# Patient Record
Sex: FEMALE | Race: WHITE | NOT HISPANIC OR LATINO | Employment: UNEMPLOYED | URBAN - METROPOLITAN AREA
[De-identification: names, ages, dates, MRNs, and addresses within clinical notes are randomized per-mention and may not be internally consistent; named-entity substitution may affect disease eponyms.]

---

## 2019-10-16 ENCOUNTER — OFFICE VISIT (OUTPATIENT)
Dept: FAMILY MEDICINE | Facility: CLINIC | Age: 64
End: 2019-10-16
Payer: OTHER GOVERNMENT

## 2019-10-16 DIAGNOSIS — E78.01 FAMILIAL HYPERCHOLESTEROLEMIA: ICD-10-CM

## 2019-10-16 DIAGNOSIS — K58.9 IRRITABLE BOWEL SYNDROME, UNSPECIFIED TYPE: ICD-10-CM

## 2019-10-16 DIAGNOSIS — F51.04 CHRONIC INSOMNIA: ICD-10-CM

## 2019-10-16 DIAGNOSIS — L03.031 CELLULITIS OF TOE OF RIGHT FOOT: Primary | ICD-10-CM

## 2019-10-16 DIAGNOSIS — M54.12 CERVICAL NEUROPATHIC PAIN: ICD-10-CM

## 2019-10-16 DIAGNOSIS — F33.9 CHRONIC RECURRENT MAJOR DEPRESSIVE DISORDER: ICD-10-CM

## 2019-10-16 DIAGNOSIS — F90.0 ATTENTION DEFICIT HYPERACTIVITY DISORDER (ADHD), PREDOMINANTLY INATTENTIVE TYPE: ICD-10-CM

## 2019-10-16 DIAGNOSIS — F41.0 PANIC DISORDER WITHOUT AGORAPHOBIA: ICD-10-CM

## 2019-10-16 DIAGNOSIS — N95.2 ATROPHIC VAGINITIS: ICD-10-CM

## 2019-10-16 PROCEDURE — 99214 PR OFFICE/OUTPT VISIT, EST, LEVL IV, 30-39 MIN: ICD-10-PCS | Mod: S$GLB,,, | Performed by: FAMILY MEDICINE

## 2019-10-16 PROCEDURE — 99214 OFFICE O/P EST MOD 30 MIN: CPT | Mod: S$GLB,,, | Performed by: FAMILY MEDICINE

## 2019-10-16 RX ORDER — DOXEPIN HYDROCHLORIDE 25 MG/1
25-50 CAPSULE ORAL NIGHTLY
Qty: 180 CAPSULE | Refills: 1 | Status: SHIPPED | OUTPATIENT
Start: 2019-10-16 | End: 2020-02-18 | Stop reason: SDUPTHER

## 2019-10-16 RX ORDER — VORTIOXETINE 20 MG/1
1 TABLET, FILM COATED ORAL DAILY
Qty: 90 TABLET | Refills: 1 | Status: SHIPPED | OUTPATIENT
Start: 2019-10-16 | End: 2020-01-14

## 2019-10-16 RX ORDER — CYCLOBENZAPRINE HCL 10 MG
10 TABLET ORAL 3 TIMES DAILY PRN
Qty: 90 TABLET | Refills: 0 | Status: SHIPPED | OUTPATIENT
Start: 2019-10-16 | End: 2020-04-13

## 2019-10-16 RX ORDER — ATORVASTATIN CALCIUM 20 MG/1
TABLET, FILM COATED ORAL
COMMUNITY
Start: 2019-07-11 | End: 2019-10-16 | Stop reason: SDUPTHER

## 2019-10-16 RX ORDER — HYDROCODONE BITARTRATE AND ACETAMINOPHEN 5; 325 MG/1; MG/1
1 TABLET ORAL EVERY 12 HOURS PRN
Qty: 60 TABLET | Refills: 0 | Status: SHIPPED | OUTPATIENT
Start: 2019-10-16 | End: 2019-11-15

## 2019-10-16 RX ORDER — ESTRADIOL 0.1 MG/G
4 CREAM VAGINAL
Qty: 3 TUBE | Refills: 1 | Status: SHIPPED | OUTPATIENT
Start: 2019-10-17 | End: 2020-02-18 | Stop reason: SDUPTHER

## 2019-10-16 RX ORDER — CYCLOBENZAPRINE HCL 10 MG
TABLET ORAL
COMMUNITY
Start: 2019-07-11 | End: 2019-10-16 | Stop reason: SDUPTHER

## 2019-10-16 RX ORDER — ZOLPIDEM TARTRATE 10 MG/1
TABLET ORAL
COMMUNITY
Start: 2019-07-11 | End: 2019-10-16 | Stop reason: SDUPTHER

## 2019-10-16 RX ORDER — ATORVASTATIN CALCIUM 20 MG/1
20 TABLET, FILM COATED ORAL DAILY
Qty: 90 TABLET | Refills: 1 | Status: SHIPPED | OUTPATIENT
Start: 2019-10-16 | End: 2020-02-18 | Stop reason: SDUPTHER

## 2019-10-16 RX ORDER — DEXTROAMPHETAMINE SACCHARATE, AMPHETAMINE ASPARTATE, DEXTROAMPHETAMINE SULFATE AND AMPHETAMINE SULFATE 5; 5; 5; 5 MG/1; MG/1; MG/1; MG/1
TABLET ORAL
COMMUNITY
End: 2019-10-16 | Stop reason: SDUPTHER

## 2019-10-16 RX ORDER — LANOLIN ALCOHOL/MO/W.PET/CERES
400 CREAM (GRAM) TOPICAL DAILY
COMMUNITY

## 2019-10-16 RX ORDER — VORTIOXETINE 20 MG/1
TABLET, FILM COATED ORAL
COMMUNITY
Start: 2019-09-23 | End: 2019-10-16 | Stop reason: SDUPTHER

## 2019-10-16 RX ORDER — ARIPIPRAZOLE 20 MG/1
20 TABLET ORAL DAILY
Qty: 90 TABLET | Refills: 1 | Status: SHIPPED | OUTPATIENT
Start: 2019-10-16 | End: 2020-02-18 | Stop reason: SDUPTHER

## 2019-10-16 RX ORDER — CIPROFLOXACIN 500 MG/1
500 TABLET ORAL EVERY 12 HOURS
Qty: 14 TABLET | Refills: 0 | Status: SHIPPED | OUTPATIENT
Start: 2019-10-16 | End: 2019-10-23

## 2019-10-16 RX ORDER — ESTRADIOL 0.1 MG/G
CREAM VAGINAL
COMMUNITY
Start: 2019-09-23 | End: 2019-10-16 | Stop reason: SDUPTHER

## 2019-10-16 RX ORDER — ARIPIPRAZOLE 20 MG/1
TABLET ORAL
COMMUNITY
End: 2019-10-16 | Stop reason: SDUPTHER

## 2019-10-16 RX ORDER — DEXTROAMPHETAMINE SACCHARATE, AMPHETAMINE ASPARTATE, DEXTROAMPHETAMINE SULFATE AND AMPHETAMINE SULFATE 5; 5; 5; 5 MG/1; MG/1; MG/1; MG/1
1 TABLET ORAL DAILY
Qty: 90 TABLET | Refills: 0 | Status: SHIPPED | OUTPATIENT
Start: 2019-10-16 | End: 2020-02-18 | Stop reason: SDUPTHER

## 2019-10-16 RX ORDER — ALPRAZOLAM 1 MG/1
1 TABLET ORAL 2 TIMES DAILY PRN
Qty: 90 TABLET | Refills: 1 | Status: SHIPPED | OUTPATIENT
Start: 2019-10-16 | End: 2020-02-18 | Stop reason: SDUPTHER

## 2019-10-16 RX ORDER — ALPRAZOLAM 1 MG/1
TABLET ORAL
COMMUNITY
Start: 2019-07-11 | End: 2019-10-16 | Stop reason: SDUPTHER

## 2019-10-16 RX ORDER — ZOLPIDEM TARTRATE 10 MG/1
20 TABLET ORAL NIGHTLY
Qty: 180 TABLET | Refills: 1 | Status: SHIPPED | OUTPATIENT
Start: 2019-10-16 | End: 2020-02-18 | Stop reason: SDUPTHER

## 2019-10-16 NOTE — PROGRESS NOTES
SUBJECTIVE:    Patient ID: Stephanie Knox is a 64 y.o. female.    Chief Complaint: cervicall neuropathic pain (check up); Depression; Insomnia; Fibromyalgia; and panic disorder without agoraphobia    Pt here to checkup on chronic conditions.    Pt recently in brackish water in [place].  Cut her left big toe 2 weeks ago and it turned red and now it is starting to spread about 3 days ago.  Got her Td and flu shot 2 days ago at the pharmacy.  Leaking a little drainage. Has been soaking in some epson salt.    Anxiety is doing ok on trintellex  House is clean, clothes are washed.  Focus is doing well also.  Depression is doing better.      Sleep is horrible, trouble falling, staying, and going back. (failed trazodone, nightmares)    Taking probiotics and magnesium and IBS is improved.    Neck pain has been horrible, doesn't wake her up. Worse if she lifts something heavy. Has numbness and tingling in the arms and hands.  Takes hydrocodone as needed, #60 has lasted 6 months, ibuprofen helps.      No visits with results within 6 Month(s) from this visit.   Latest known visit with results is:   No results found for any previous visit.       Past Medical History:   Diagnosis Date    ADHD (attention deficit hyperactivity disorder)     Anxiety     Arthritis     Colon polyps     Depression     Hyperlipidemia     Insomnia     Memory difficulties      Past Surgical History:   Procedure Laterality Date    TONSILLECTOMY       Family History   Problem Relation Age of Onset    Emphysema Mother     Diabetes Father        Marital Status:   Alcohol History:  reports that she drank alcohol.  Tobacco History:  reports that she has been smoking. She has been smoking about 0.25 packs per day. She has never used smokeless tobacco.  Drug History:  reports that she does not use drugs.    Review of patient's allergies indicates:  No Known Allergies    Current Outpatient Medications:     ALPRAZolam (XANAX) 1 MG tablet,  Take 1 tablet (1 mg total) by mouth 2 (two) times daily as needed for Anxiety., Disp: 90 tablet, Rfl: 1    ARIPiprazole (ABILIFY) 20 MG Tab, Take 1 tablet (20 mg total) by mouth once daily., Disp: 90 tablet, Rfl: 1    atorvastatin (LIPITOR) 20 MG tablet, Take 1 tablet (20 mg total) by mouth once daily., Disp: 90 tablet, Rfl: 1    cyclobenzaprine (FLEXERIL) 10 MG tablet, Take 1 tablet (10 mg total) by mouth 3 (three) times daily as needed for Muscle spasms., Disp: 90 tablet, Rfl: 0    dextroamphetamine-amphetamine (ADDERALL) 20 mg tablet, Take 1 tablet by mouth once daily., Disp: 90 tablet, Rfl: 0    estradiol (ESTRACE) 0.01 % (0.1 mg/gram) vaginal cream, Place 4 g vaginally twice a week., Disp: 3 Tube, Rfl: 1    Lactobacillus rhamnosus GG (CULTURELLE) 10 billion cell capsule, Take 1 capsule by mouth once daily., Disp: , Rfl:     magnesium oxide (MAG-OX) 400 mg (241.3 mg magnesium) tablet, Take 400 mg by mouth once daily., Disp: , Rfl:     TRINTELLIX 20 mg Tab, Take 1 tablet (20 mg total) by mouth once daily., Disp: 90 tablet, Rfl: 1    zolpidem (AMBIEN) 10 mg Tab, Take 2 tablets (20 mg total) by mouth every evening., Disp: 180 tablet, Rfl: 1    ciprofloxacin HCl (CIPRO) 500 MG tablet, Take 1 tablet (500 mg total) by mouth every 12 (twelve) hours. for 7 days, Disp: 14 tablet, Rfl: 0    doxepin (SINEQUAN) 25 MG capsule, Take 1-2 capsules (25-50 mg total) by mouth every evening., Disp: 180 capsule, Rfl: 1    HYDROcodone-acetaminophen (NORCO) 5-325 mg per tablet, Take 1 tablet by mouth every 12 (twelve) hours as needed., Disp: 60 tablet, Rfl: 0    Review of Systems   Constitutional: Negative for appetite change, fatigue, fever and unexpected weight change.   Respiratory: Negative for cough, chest tightness, shortness of breath and wheezing.    Cardiovascular: Negative for chest pain and leg swelling.   Gastrointestinal: Negative for abdominal pain, constipation, nausea and vomiting.   Genitourinary: Negative  "for difficulty urinating, dysuria, frequency, menstrual problem, vaginal bleeding and vaginal discharge.   Musculoskeletal: Positive for myalgias and neck pain. Negative for arthralgias and back pain.   Skin: Positive for wound. Negative for rash.   Neurological: Positive for numbness (in the arms). Negative for dizziness, weakness and headaches.   Hematological: Does not bruise/bleed easily.   Psychiatric/Behavioral: Positive for sleep disturbance. Negative for dysphoric mood and suicidal ideas. The patient is not nervous/anxious.           Objective:      Vitals:    10/16/19 1058   BP: (P) 110/80   Pulse: (P) 96   Weight: (P) 80.6 kg (177 lb 9.6 oz)   Height: (P) 5' 7" (1.702 m)     Body mass index is 27.82 kg/m² (pended).  Physical Exam   Constitutional: She is oriented to person, place, and time. She appears well-developed and well-nourished. No distress.   overweight   HENT:   Head: Normocephalic and atraumatic.   Neck: Neck supple. No thyromegaly present.   Cardiovascular: Normal rate, regular rhythm and normal heart sounds. Exam reveals no friction rub.   No murmur heard.  Pulmonary/Chest: Effort normal and breath sounds normal. She has no wheezes. She has no rales.   Abdominal: Soft. Bowel sounds are normal. She exhibits no distension. There is no tenderness.   Musculoskeletal: She exhibits no edema.   Lymphadenopathy:     She has no cervical adenopathy.   Neurological: She is alert and oriented to person, place, and time.   Skin: Skin is warm and dry. Lesion (left big toe, with erythema around the lateral nail, with scant drainage, no induration.) noted. No rash noted.   Psychiatric: She has a normal mood and affect. Her speech is normal and behavior is normal. Judgment and thought content normal.   Vitals reviewed.        Assessment:       1. Cellulitis of toe of right foot    2. Panic disorder without agoraphobia    3. Chronic recurrent major depressive disorder    4. Irritable bowel syndrome, unspecified " type    5. Chronic insomnia    6. Cervical neuropathic pain    7. Attention deficit hyperactivity disorder (ADHD), predominantly inattentive type    8. Atrophic vaginitis    9. Familial hypercholesterolemia         Plan:       Cellulitis of toe of right foot  Comments:  Placed on cipro due to exposure to water and unlikelihood of MRSA  Orders:  -     ciprofloxacin HCl (CIPRO) 500 MG tablet; Take 1 tablet (500 mg total) by mouth every 12 (twelve) hours. for 7 days  Dispense: 14 tablet; Refill: 0    Panic disorder without agoraphobia  Comments:  Controlled. on trintellex and prn Xanax. Will continue to monitor for symptoms  Orders:  -     ALPRAZolam (XANAX) 1 MG tablet; Take 1 tablet (1 mg total) by mouth 2 (two) times daily as needed for Anxiety.  Dispense: 90 tablet; Refill: 1    Chronic recurrent major depressive disorder  Comments:  Improved on trintellix  Orders:  -     ARIPiprazole (ABILIFY) 20 MG Tab; Take 1 tablet (20 mg total) by mouth once daily.  Dispense: 90 tablet; Refill: 1  -     TRINTELLIX 20 mg Tab; Take 1 tablet (20 mg total) by mouth once daily.  Dispense: 90 tablet; Refill: 1    Irritable bowel syndrome, unspecified type  Comments:  Improved on probiotics, will continue to monitor symptoms    Chronic insomnia  Comments:  Symptomatic. Has failed may meds. Will try on doxepin.  Orders:  -     zolpidem (AMBIEN) 10 mg Tab; Take 2 tablets (20 mg total) by mouth every evening.  Dispense: 180 tablet; Refill: 1  -     doxepin (SINEQUAN) 25 MG capsule; Take 1-2 capsules (25-50 mg total) by mouth every evening.  Dispense: 180 capsule; Refill: 1    Cervical neuropathic pain  Comments:  Pain controlled. Will continue to monitor control and function  Orders:  -     cyclobenzaprine (FLEXERIL) 10 MG tablet; Take 1 tablet (10 mg total) by mouth 3 (three) times daily as needed for Muscle spasms.  Dispense: 90 tablet; Refill: 0  -     HYDROcodone-acetaminophen (NORCO) 5-325 mg per tablet; Take 1 tablet by mouth  every 12 (twelve) hours as needed.  Dispense: 60 tablet; Refill: 0    Attention deficit hyperactivity disorder (ADHD), predominantly inattentive type  Comments:  Stable. Given refill on Adderall.  Orders:  -     dextroamphetamine-amphetamine (ADDERALL) 20 mg tablet; Take 1 tablet by mouth once daily.  Dispense: 90 tablet; Refill: 0    Atrophic vaginitis  Comments:  Doing well on estrogen replacement  Orders:  -     estradiol (ESTRACE) 0.01 % (0.1 mg/gram) vaginal cream; Place 4 g vaginally twice a week.  Dispense: 3 Tube; Refill: 1    Familial hypercholesterolemia  Comments:  Statin reordered for pt today  Orders:  -     atorvastatin (LIPITOR) 20 MG tablet; Take 1 tablet (20 mg total) by mouth once daily.  Dispense: 90 tablet; Refill: 1    Chronic Pain Notes:  Have discussed the risks and benefits of chronic narcotic therapy including pain control, dependence, and addiction.  The patient is aware and accepts the risks and benefits.  Patient is aware of the risk of taking narcotics combined with benzodiazepines/muscle relaxers/hypnotics, including but not limited to breathing difficulties, coma, and death; accepts the risks; and agrees to use medications only as prescribed.    Follow up in about 4 months (around 2/16/2020).

## 2019-10-20 PROBLEM — F51.04 CHRONIC INSOMNIA: Status: ACTIVE | Noted: 2019-10-20

## 2019-10-20 PROBLEM — M54.12 CERVICAL NEUROPATHIC PAIN: Status: ACTIVE | Noted: 2019-10-20

## 2019-10-20 PROBLEM — F33.9 CHRONIC RECURRENT MAJOR DEPRESSIVE DISORDER: Status: ACTIVE | Noted: 2019-10-20

## 2019-10-20 PROBLEM — N95.2 ATROPHIC VAGINITIS: Status: ACTIVE | Noted: 2019-10-20

## 2019-10-20 PROBLEM — F90.0 ATTENTION DEFICIT HYPERACTIVITY DISORDER (ADHD), PREDOMINANTLY INATTENTIVE TYPE: Status: ACTIVE | Noted: 2019-10-20

## 2019-10-20 PROBLEM — E78.01 FAMILIAL HYPERCHOLESTEROLEMIA: Status: ACTIVE | Noted: 2019-10-20

## 2019-10-20 PROBLEM — K58.9 IRRITABLE BOWEL SYNDROME: Status: ACTIVE | Noted: 2019-10-20

## 2019-10-20 PROBLEM — F41.0 PANIC DISORDER WITHOUT AGORAPHOBIA: Status: ACTIVE | Noted: 2019-10-20

## 2019-10-20 PROBLEM — M79.7 FIBROMYALGIA: Status: ACTIVE | Noted: 2019-10-20

## 2019-10-28 ENCOUNTER — TELEPHONE (OUTPATIENT)
Dept: FAMILY MEDICINE | Facility: CLINIC | Age: 64
End: 2019-10-28

## 2019-10-28 DIAGNOSIS — L03.031 CELLULITIS OF TOE OF RIGHT FOOT: Primary | ICD-10-CM

## 2019-10-28 NOTE — TELEPHONE ENCOUNTER
Pt called stating that she had finished the cipro Dr. Hill prescribed to her at her 10/16/19 ov for he infected cut on her toe. Pt states that the toe is still red and hurt a lot. Pt states that it hurts when she puts on shoes. Pt requesting another abx or to refill the cipro to [facility]. Pt states that she has used neosporin but it doesn't help.

## 2019-10-29 ENCOUNTER — PATIENT MESSAGE (OUTPATIENT)
Dept: FAMILY MEDICINE | Facility: CLINIC | Age: 64
End: 2019-10-29

## 2019-10-29 DIAGNOSIS — L03.031 CELLULITIS OF TOE OF RIGHT FOOT: Primary | ICD-10-CM

## 2019-10-31 ENCOUNTER — TELEPHONE (OUTPATIENT)
Dept: FAMILY MEDICINE | Facility: CLINIC | Age: 64
End: 2019-10-31

## 2019-10-31 NOTE — TELEPHONE ENCOUNTER
----- Message from Jared Hodge sent at 10/31/2019 11:00 AM CDT -----  Can you refax referral dr office says they dont have it, but it was yesterday at 3:19 pm please fax to [phone]  Pt [phone]

## 2020-02-18 ENCOUNTER — OFFICE VISIT (OUTPATIENT)
Dept: FAMILY MEDICINE | Facility: CLINIC | Age: 65
End: 2020-02-18
Payer: OTHER GOVERNMENT

## 2020-02-18 VITALS — BODY MASS INDEX: 29.45 KG/M2 | HEART RATE: 103 BPM | WEIGHT: 187.63 LBS | HEIGHT: 67 IN

## 2020-02-18 DIAGNOSIS — Z12.31 SCREENING MAMMOGRAM, ENCOUNTER FOR: ICD-10-CM

## 2020-02-18 DIAGNOSIS — E78.01 FAMILIAL HYPERCHOLESTEROLEMIA: ICD-10-CM

## 2020-02-18 DIAGNOSIS — F90.0 ATTENTION DEFICIT HYPERACTIVITY DISORDER (ADHD), PREDOMINANTLY INATTENTIVE TYPE: ICD-10-CM

## 2020-02-18 DIAGNOSIS — N95.2 ATROPHIC VAGINITIS: ICD-10-CM

## 2020-02-18 DIAGNOSIS — F33.9 CHRONIC RECURRENT MAJOR DEPRESSIVE DISORDER: Primary | ICD-10-CM

## 2020-02-18 DIAGNOSIS — M25.551 RIGHT HIP PAIN: ICD-10-CM

## 2020-02-18 DIAGNOSIS — F51.04 CHRONIC INSOMNIA: ICD-10-CM

## 2020-02-18 DIAGNOSIS — G89.29 CHRONIC PAIN OF RIGHT KNEE: ICD-10-CM

## 2020-02-18 DIAGNOSIS — M25.561 CHRONIC PAIN OF RIGHT KNEE: ICD-10-CM

## 2020-02-18 DIAGNOSIS — F41.0 PANIC DISORDER WITHOUT AGORAPHOBIA: ICD-10-CM

## 2020-02-18 PROCEDURE — 99214 PR OFFICE/OUTPT VISIT, EST, LEVL IV, 30-39 MIN: ICD-10-PCS | Mod: S$GLB,,, | Performed by: FAMILY MEDICINE

## 2020-02-18 PROCEDURE — 99214 OFFICE O/P EST MOD 30 MIN: CPT | Mod: S$GLB,,, | Performed by: FAMILY MEDICINE

## 2020-02-18 RX ORDER — ALPRAZOLAM 1 MG/1
1 TABLET ORAL 2 TIMES DAILY PRN
Qty: 90 TABLET | Refills: 1 | Status: SHIPPED | OUTPATIENT
Start: 2020-02-18 | End: 2020-05-19 | Stop reason: SDUPTHER

## 2020-02-18 RX ORDER — ZOLPIDEM TARTRATE 10 MG/1
20 TABLET ORAL NIGHTLY
Qty: 180 TABLET | Refills: 1 | Status: SHIPPED | OUTPATIENT
Start: 2020-02-18 | End: 2020-05-19 | Stop reason: SDUPTHER

## 2020-02-18 RX ORDER — DOXEPIN HYDROCHLORIDE 25 MG/1
25-50 CAPSULE ORAL NIGHTLY
Qty: 180 CAPSULE | Refills: 1 | Status: SHIPPED | OUTPATIENT
Start: 2020-02-18 | End: 2020-02-18 | Stop reason: SDUPTHER

## 2020-02-18 RX ORDER — DEXTROAMPHETAMINE SACCHARATE, AMPHETAMINE ASPARTATE, DEXTROAMPHETAMINE SULFATE AND AMPHETAMINE SULFATE 5; 5; 5; 5 MG/1; MG/1; MG/1; MG/1
1 TABLET ORAL DAILY
Qty: 90 TABLET | Refills: 0 | Status: SHIPPED | OUTPATIENT
Start: 2020-02-18 | End: 2020-05-19 | Stop reason: SDUPTHER

## 2020-02-18 RX ORDER — HYDROCODONE BITARTRATE AND ACETAMINOPHEN 5; 325 MG/1; MG/1
1 TABLET ORAL EVERY 12 HOURS PRN
COMMUNITY
Start: 2019-04-08 | End: 2020-05-19 | Stop reason: SDUPTHER

## 2020-02-18 RX ORDER — VORTIOXETINE 20 MG/1
1 TABLET, FILM COATED ORAL DAILY
COMMUNITY
Start: 2020-02-03 | End: 2021-03-01

## 2020-02-18 RX ORDER — ARIPIPRAZOLE 20 MG/1
20 TABLET ORAL DAILY
Qty: 90 TABLET | Refills: 1 | Status: SHIPPED | OUTPATIENT
Start: 2020-02-18 | End: 2021-06-01

## 2020-02-18 RX ORDER — ESTRADIOL 0.1 MG/G
4 CREAM VAGINAL
Qty: 3 TUBE | Refills: 1 | Status: SHIPPED | OUTPATIENT
Start: 2020-02-20 | End: 2020-05-19 | Stop reason: SDUPTHER

## 2020-02-18 RX ORDER — DOXEPIN HYDROCHLORIDE 25 MG/1
25-50 CAPSULE ORAL NIGHTLY
Qty: 180 CAPSULE | Refills: 1 | Status: SHIPPED | OUTPATIENT
Start: 2020-02-18 | End: 2020-05-19 | Stop reason: SDUPTHER

## 2020-02-18 RX ORDER — ATORVASTATIN CALCIUM 20 MG/1
20 TABLET, FILM COATED ORAL DAILY
Qty: 90 TABLET | Refills: 1 | Status: SHIPPED | OUTPATIENT
Start: 2020-02-18 | End: 2020-05-19 | Stop reason: SDUPTHER

## 2020-02-18 NOTE — PROGRESS NOTES
SUBJECTIVE:    Patient ID: Stephanie Knox is a 64 y.o. female.    Chief Complaint: Depression; Panic disorder; Bottles brought; Knee Pain (right with hip pain x 2.5 months); and Constipation    Pt here to checkup on chronic conditions.    Anxiety is doing ok on trintellex  Focus is doing well on adderall as needed, takes a lot of notes, getting most of her housework needed.  Depression is continues to do well.      Has been sleeping better than she has in years, 5-7 hours a night, on ambien and doxepin.  Feels better in the day as result. (failed trazodone, nightmares)    IBS is doing ok probiotics and magnesium, though has been having some issues with constipation, and fluid.    Neck pain is bad, but is is not waking her up anymore.  Using a topical cream her cousin gave her.  Can't use pain patches anymore as she breaks out in a rash.  Uses a flexeril if it gets too bad.     Right knee and inner right groin has been bothering her some if she steps wrong sometimes.  Played a lot of sports as a kid, and used to have to get it drained every now and then as it used to swelled.  Walks for exercise, about half mile. But can't use if for steps.   Has numbness and tingling in the arms and hands.  Takes hydrocodone as needed, #60 has lasted 6 months, ibuprofen helps.      ---------------------------------------------------------------------  Pt had cscope in 10/2019, had polyps and has to repeat 2-3 years  Last mammogram has been a while      No visits with results within 6 Month(s) from this visit.   Latest known visit with results is:   No results found for any previous visit.       Past Medical History:   Diagnosis Date    ADHD (attention deficit hyperactivity disorder)     Anxiety     Arthritis     Colon polyps     Depression     Hyperlipidemia     Insomnia     Memory difficulties      Past Surgical History:   Procedure Laterality Date    TONSILLECTOMY       Family History   Problem Relation Age of Onset     Emphysema Mother     Diabetes Father        Marital Status:   Alcohol History:  reports that she drank alcohol.  Tobacco History:  reports that she has been smoking. She has been smoking about 0.25 packs per day. She has never used smokeless tobacco.  Drug History:  reports that she does not use drugs.    Review of patient's allergies indicates:  No Known Allergies    Current Outpatient Medications:     ALPRAZolam (XANAX) 1 MG tablet, Take 1 tablet (1 mg total) by mouth 2 (two) times daily as needed for Anxiety., Disp: 90 tablet, Rfl: 1    ARIPiprazole (ABILIFY) 20 MG Tab, Take 1 tablet (20 mg total) by mouth once daily., Disp: 90 tablet, Rfl: 1    atorvastatin (LIPITOR) 20 MG tablet, Take 1 tablet (20 mg total) by mouth once daily., Disp: 90 tablet, Rfl: 1    cyclobenzaprine (FLEXERIL) 10 MG tablet, Take 1 tablet (10 mg total) by mouth 3 (three) times daily as needed for Muscle spasms., Disp: 90 tablet, Rfl: 0    dextroamphetamine-amphetamine (ADDERALL) 20 mg tablet, Take 1 tablet by mouth once daily., Disp: 90 tablet, Rfl: 0    doxepin (SINEQUAN) 25 MG capsule, Take 1-2 capsules (25-50 mg total) by mouth every evening., Disp: 180 capsule, Rfl: 1    [START ON 2/20/2020] estradiol (ESTRACE) 0.01 % (0.1 mg/gram) vaginal cream, Place 4 g vaginally twice a week., Disp: 3 Tube, Rfl: 1    HYDROcodone-acetaminophen (NORCO) 5-325 mg per tablet, Take 1 tablet by mouth every 12 (twelve) hours as needed., Disp: , Rfl:     Lactobacillus rhamnosus GG (CULTURELLE) 10 billion cell capsule, Take 1 capsule by mouth once daily., Disp: , Rfl:     magnesium oxide (MAG-OX) 400 mg (241.3 mg magnesium) tablet, Take 400 mg by mouth once daily., Disp: , Rfl:     TRINTELLIX 20 mg Tab, Take 1 tablet by mouth once daily., Disp: , Rfl:     zolpidem (AMBIEN) 10 mg Tab, Take 2 tablets (20 mg total) by mouth every evening., Disp: 180 tablet, Rfl: 1    Review of Systems   Constitutional: Negative for appetite change,  "fatigue, fever and unexpected weight change.   Respiratory: Negative for cough, chest tightness, shortness of breath and wheezing.    Cardiovascular: Negative for chest pain and leg swelling.   Gastrointestinal: Negative for abdominal pain, constipation, nausea and vomiting.        -heartburn   Genitourinary: Negative for difficulty urinating, dysuria, frequency and urgency.   Musculoskeletal: Positive for arthralgias (right knee) and neck pain. Negative for back pain and myalgias.   Skin: Negative for rash.   Neurological: Positive for numbness. Negative for dizziness, weakness and headaches.   Hematological: Does not bruise/bleed easily.   Psychiatric/Behavioral: Positive for dysphoric mood and sleep disturbance. Negative for suicidal ideas. The patient is not nervous/anxious.    All other systems reviewed and are negative.         Objective:      Vitals:    02/18/20 1037   BP: (P) 120/84   Pulse: 103   Weight: 85.1 kg (187 lb 9.6 oz)   Height: 5' 7" (1.702 m)     Body mass index is 29.38 kg/m².     Physical Exam   Constitutional: She is oriented to person, place, and time. She appears well-developed and well-nourished. No distress.   HENT:   Head: Normocephalic and atraumatic.   Neck: Neck supple. No thyromegaly present.   Cardiovascular: Normal rate, regular rhythm and normal heart sounds. Exam reveals no friction rub.   No murmur heard.  Pulmonary/Chest: Effort normal and breath sounds normal. She has no wheezes. She has no rales.   Abdominal: Soft. Bowel sounds are normal. She exhibits no distension. There is no tenderness.   Musculoskeletal: She exhibits no edema.        Right hip: She exhibits tenderness (mild with abduction). She exhibits normal range of motion, normal strength, no bony tenderness, no swelling and no deformity.        Right knee: She exhibits normal range of motion, no swelling and no effusion.   crepitus   Lymphadenopathy:     She has no cervical adenopathy.   Neurological: She is alert " and oriented to person, place, and time.   Skin: Skin is warm and dry. No rash noted.   Psychiatric: She has a normal mood and affect. Her speech is normal and behavior is normal. Judgment and thought content normal. She is attentive.   Vitals reviewed.        Assessment:       1. Chronic recurrent major depressive disorder    2. Panic disorder without agoraphobia    3. Familial hypercholesterolemia    4. Atrophic vaginitis    5. Chronic insomnia    6. Attention deficit hyperactivity disorder (ADHD), predominantly inattentive type    7. Chronic pain of right knee    8. Right hip pain    9. Screening mammogram, encounter for         Plan:       Chronic recurrent major depressive disorder  Comments:  Improved, on trintellix. Will continue to monitor for remission  Orders:  -     ARIPiprazole (ABILIFY) 20 MG Tab; Take 1 tablet (20 mg total) by mouth once daily.  Dispense: 90 tablet; Refill: 1    Panic disorder without agoraphobia  Comments:  Controlled. On trintellex and prn Xanax. Will continue to monitor for symptoms  Orders:  -     ALPRAZolam (XANAX) 1 MG tablet; Take 1 tablet (1 mg total) by mouth 2 (two) times daily as needed for Anxiety.  Dispense: 90 tablet; Refill: 1    Familial hypercholesterolemia  Comments:  Active. Statin reordered for pt today. Labs ordered for monitoring on chronic condition  Orders:  -     atorvastatin (LIPITOR) 20 MG tablet; Take 1 tablet (20 mg total) by mouth once daily.  Dispense: 90 tablet; Refill: 1  -     X-Ray Hip 4 or more views Right; Future; Expected date: 02/18/2020    Atrophic vaginitis  Comments:  Chronic. Doing well on estrogen replacement  Orders:  -     estradiol (ESTRACE) 0.01 % (0.1 mg/gram) vaginal cream; Place 4 g vaginally twice a week.  Dispense: 3 Tube; Refill: 1    Chronic insomnia  Comments:  Symptomatic. Has failed may meds. Will try on doxepin.  Orders:  -     Discontinue: doxepin (SINEQUAN) 25 MG capsule; Take 1-2 capsules (25-50 mg total) by mouth every  evening.  Dispense: 180 capsule; Refill: 1  -     doxepin (SINEQUAN) 25 MG capsule; Take 1-2 capsules (25-50 mg total) by mouth every evening.  Dispense: 180 capsule; Refill: 1  -     zolpidem (AMBIEN) 10 mg Tab; Take 2 tablets (20 mg total) by mouth every evening.  Dispense: 180 tablet; Refill: 1    Attention deficit hyperactivity disorder (ADHD), predominantly inattentive type  Comments:  Not focused today, on Adderall prn.  Encouraged pt to take Adderall as needed for task completion and concentration enhancement.  Orders:  -     dextroamphetamine-amphetamine (ADDERALL) 20 mg tablet; Take 1 tablet by mouth once daily.  Dispense: 90 tablet; Refill: 0    Chronic pain of right knee  Comments:  Chronic. Will Xray to evaluate severity  Orders:  -     X-Ray Knee 3 View Right; Future; Expected date: 02/18/2020    Right hip pain  Comments:  Acute, may be referred pain. Will xray hip to rule out bony pathology.    Screening mammogram, encounter for  Comments:  Mammgram order sent to [facility]  Orders:  -     Mammo Digital Screening Bilat; Future; Expected date: 02/18/2020    Chronic Pain Notes:  Have discussed the risks and benefits of chronic narcotic therapy including pain control, dependence, and addiction.  The patient is aware and accepts the risks and benefits.  Patient is aware of the risk of taking narcotics combined with benzodiazepines/muscle relaxers/hypnotics, including but not limited to breathing difficulties, coma, and death; accepts the risks; and agrees to use medications only as prescribed.    Follow up in about 3 months (around 5/18/2020) for Anxiety, Insomnia, Depression, Arthritis.

## 2020-02-21 ENCOUNTER — TELEPHONE (OUTPATIENT)
Dept: FAMILY MEDICINE | Facility: CLINIC | Age: 65
End: 2020-02-21

## 2020-02-21 NOTE — TELEPHONE ENCOUNTER
Spoke with pt - Pt asked the status of her mammogram. Pt states that she is scheduled to have it done on 2/26/2020

## 2020-02-26 ENCOUNTER — HOSPITAL ENCOUNTER (OUTPATIENT)
Dept: RADIOLOGY | Facility: HOSPITAL | Age: 65
Discharge: HOME | End: 2020-02-26
Attending: FAMILY MEDICINE
Payer: OTHER GOVERNMENT

## 2020-02-26 VITALS — BODY MASS INDEX: 29.45 KG/M2 | HEIGHT: 67 IN | WEIGHT: 187.63 LBS

## 2020-02-26 DIAGNOSIS — M25.561 CHRONIC PAIN OF RIGHT KNEE: ICD-10-CM

## 2020-02-26 DIAGNOSIS — G89.29 CHRONIC PAIN OF RIGHT KNEE: ICD-10-CM

## 2020-02-26 DIAGNOSIS — E78.01 FAMILIAL HYPERCHOLESTEROLEMIA: ICD-10-CM

## 2020-02-26 DIAGNOSIS — Z12.31 SCREENING MAMMOGRAM, ENCOUNTER FOR: ICD-10-CM

## 2020-02-26 PROCEDURE — 73502 X-RAY EXAM HIP UNI 2-3 VIEWS: CPT | Mod: TC,PO,RT

## 2020-02-26 PROCEDURE — 73562 X-RAY EXAM OF KNEE 3: CPT | Mod: TC,PO,RT

## 2020-02-26 PROCEDURE — 77067 SCR MAMMO BI INCL CAD: CPT | Mod: TC,PO

## 2020-02-27 ENCOUNTER — TELEPHONE (OUTPATIENT)
Dept: FAMILY MEDICINE | Facility: CLINIC | Age: 65
End: 2020-02-27

## 2020-02-27 NOTE — TELEPHONE ENCOUNTER
----- Message from Eron Hill MD sent at 2/27/2020  8:43 AM CST -----  Please let the patient know that her mammogram is normal. To repeat in 1-2 years.

## 2020-03-04 ENCOUNTER — TELEPHONE (OUTPATIENT)
Dept: FAMILY MEDICINE | Facility: CLINIC | Age: 65
End: 2020-03-04

## 2020-03-04 NOTE — PROGRESS NOTES
Spoke to patient with results verbatim per Dr Hill. Verbalized understanding and states she will hold off on ortho referral for now.

## 2020-03-05 ENCOUNTER — TELEPHONE (OUTPATIENT)
Dept: FAMILY MEDICINE | Facility: CLINIC | Age: 65
End: 2020-03-05

## 2020-03-05 NOTE — TELEPHONE ENCOUNTER
----- Message from Eron Hill MD sent at 3/4/2020  2:02 PM CST -----  Please call the patient regarding her abnormal result.    1. Mild osteo arthritis of the knee.

## 2020-03-05 NOTE — TELEPHONE ENCOUNTER
Spoke with pt - Pt informed of her abnormal results and recommendations per Dr. Hill verbatim. Pt verbalized an understanding of our conversation. ELVIS

## 2020-03-05 NOTE — TELEPHONE ENCOUNTER
----- Message from Eron Hill MD sent at 3/4/2020  2:04 PM CST -----  Please call the patient regarding her abnormal result.    1. Mild joint space narrowing.      ## Pt has no obvious abnormalities of the hips, and mild arthritis of the knee. May benefit from seeing a orthopedist

## 2020-03-05 NOTE — TELEPHONE ENCOUNTER
Spoke with pt - Pt informed of her abnormal results and recommendations per Dr. Hill verbatim. Pt verbalized an understanding of our conversation but states that she isn't interested in seeing an orthopedist at this time. Pt states that she'll discuss possible PT at her next visit in May. ELVIS

## 2020-03-06 ENCOUNTER — TELEPHONE (OUTPATIENT)
Dept: FAMILY MEDICINE | Facility: CLINIC | Age: 65
End: 2020-03-06

## 2020-05-19 DIAGNOSIS — F51.04 CHRONIC INSOMNIA: ICD-10-CM

## 2020-05-19 DIAGNOSIS — F41.0 PANIC DISORDER WITHOUT AGORAPHOBIA: ICD-10-CM

## 2020-05-19 DIAGNOSIS — N95.2 ATROPHIC VAGINITIS: ICD-10-CM

## 2020-05-19 DIAGNOSIS — E78.01 FAMILIAL HYPERCHOLESTEROLEMIA: ICD-10-CM

## 2020-05-19 DIAGNOSIS — F90.0 ATTENTION DEFICIT HYPERACTIVITY DISORDER (ADHD), PREDOMINANTLY INATTENTIVE TYPE: ICD-10-CM

## 2020-05-19 RX ORDER — ESTRADIOL 0.1 MG/G
4 CREAM VAGINAL
Qty: 3 TUBE | Refills: 1 | Status: SHIPPED | OUTPATIENT
Start: 2020-05-21 | End: 2020-09-10 | Stop reason: SDUPTHER

## 2020-05-19 RX ORDER — DEXTROAMPHETAMINE SACCHARATE, AMPHETAMINE ASPARTATE, DEXTROAMPHETAMINE SULFATE AND AMPHETAMINE SULFATE 5; 5; 5; 5 MG/1; MG/1; MG/1; MG/1
1 TABLET ORAL DAILY
Qty: 90 TABLET | Refills: 0 | Status: SHIPPED | OUTPATIENT
Start: 2020-05-19 | End: 2020-09-10 | Stop reason: SDUPTHER

## 2020-05-19 RX ORDER — HYDROCODONE BITARTRATE AND ACETAMINOPHEN 5; 325 MG/1; MG/1
1 TABLET ORAL EVERY 12 HOURS PRN
Qty: 60 TABLET | Refills: 0 | Status: SHIPPED | OUTPATIENT
Start: 2020-05-19 | End: 2020-11-30 | Stop reason: SDUPTHER

## 2020-05-19 RX ORDER — ZOLPIDEM TARTRATE 10 MG/1
20 TABLET ORAL NIGHTLY
Qty: 180 TABLET | Refills: 0 | Status: SHIPPED | OUTPATIENT
Start: 2020-05-19 | End: 2020-09-10 | Stop reason: SDUPTHER

## 2020-05-19 RX ORDER — DOXEPIN HYDROCHLORIDE 25 MG/1
25-50 CAPSULE ORAL NIGHTLY
Qty: 180 CAPSULE | Refills: 0 | Status: SHIPPED | OUTPATIENT
Start: 2020-05-19 | End: 2020-09-10 | Stop reason: SDUPTHER

## 2020-05-19 RX ORDER — ALPRAZOLAM 1 MG/1
1 TABLET ORAL 2 TIMES DAILY PRN
Qty: 90 TABLET | Refills: 0 | Status: SHIPPED | OUTPATIENT
Start: 2020-05-19 | End: 2020-09-10 | Stop reason: SDUPTHER

## 2020-05-19 RX ORDER — ATORVASTATIN CALCIUM 20 MG/1
20 TABLET, FILM COATED ORAL DAILY
Qty: 90 TABLET | Refills: 0 | Status: SHIPPED | OUTPATIENT
Start: 2020-05-19 | End: 2020-09-10 | Stop reason: SDUPTHER

## 2020-05-19 NOTE — TELEPHONE ENCOUNTER
----- Message from Sugey Keene MA sent at 5/19/2020 11:08 AM CDT -----  Pt states he has appt with Dr. Hill tomorrow, but has been cancelled.  She is wondering is she can get her refills or does she need to come in for a visit.    Xanax 1 mg  Ambien 10 mg  Doxepin 25 mg  Adderall 20 mg  Atorvastatin 20 mg  Estrace vaginal cream  Hydrocodone 5-325 mg    Ph[facility] - [facility]    Please contact pt to advise @ [phone]

## 2020-05-19 NOTE — TELEPHONE ENCOUNTER
Pt needs to have hydrocodone and xanax sent to a local pharmacy or printed, cannot be sent to [facility]

## 2020-05-19 NOTE — TELEPHONE ENCOUNTER
Spoke with pt - pt informed that her controlled rx have been printed and available for her to  in the front office. Pt also scheduled and appt with Dr. Hill. ELVIS

## 2020-06-10 ENCOUNTER — OFFICE VISIT (OUTPATIENT)
Dept: FAMILY MEDICINE | Facility: CLINIC | Age: 65
End: 2020-06-10
Payer: OTHER GOVERNMENT

## 2020-06-10 VITALS
DIASTOLIC BLOOD PRESSURE: 82 MMHG | WEIGHT: 190.63 LBS | HEART RATE: 108 BPM | TEMPERATURE: 98 F | OXYGEN SATURATION: 95 % | HEIGHT: 67 IN | BODY MASS INDEX: 29.92 KG/M2 | SYSTOLIC BLOOD PRESSURE: 124 MMHG

## 2020-06-10 DIAGNOSIS — F90.0 ATTENTION DEFICIT HYPERACTIVITY DISORDER (ADHD), PREDOMINANTLY INATTENTIVE TYPE: ICD-10-CM

## 2020-06-10 DIAGNOSIS — Z79.899 LONG-TERM USE OF HIGH-RISK MEDICATION: ICD-10-CM

## 2020-06-10 DIAGNOSIS — Z13.89 SCREENING FOR BLOOD OR PROTEIN IN URINE: ICD-10-CM

## 2020-06-10 DIAGNOSIS — F51.04 CHRONIC INSOMNIA: ICD-10-CM

## 2020-06-10 DIAGNOSIS — F33.9 CHRONIC RECURRENT MAJOR DEPRESSIVE DISORDER: Primary | ICD-10-CM

## 2020-06-10 DIAGNOSIS — F17.200 SMOKER: ICD-10-CM

## 2020-06-10 DIAGNOSIS — M54.12 CERVICAL NEUROPATHIC PAIN: ICD-10-CM

## 2020-06-10 DIAGNOSIS — F41.0 PANIC DISORDER WITHOUT AGORAPHOBIA: ICD-10-CM

## 2020-06-10 DIAGNOSIS — Z13.6 SCREENING FOR ISCHEMIC HEART DISEASE (IHD): ICD-10-CM

## 2020-06-10 PROCEDURE — 99214 OFFICE O/P EST MOD 30 MIN: CPT | Mod: S$GLB,,, | Performed by: FAMILY MEDICINE

## 2020-06-10 PROCEDURE — 99214 PR OFFICE/OUTPT VISIT, EST, LEVL IV, 30-39 MIN: ICD-10-PCS | Mod: S$GLB,,, | Performed by: FAMILY MEDICINE

## 2020-06-10 NOTE — PROGRESS NOTES
SUBJECTIVE:    Patient ID: Stephanie Knox is a 64 y.o. female.    Chief Complaint: Depression (follow up) and Bottles brought    Pt here to checkup on chronic conditions.    Anxiety and depression is bad.  Thinks the weather is influencing.   Worried about things with her children.   Doesn't want to talk to her sisters lately.  Granddaughter (10 year old) and her two dogs are staying with her    Focus is doing well on adderall as needed, when she is going to travel or needs to get a lot of things done.     Has been sleeping better well. About 5-7 hours a night, on ambien and doxepin. (failed trazodone, nightmares)    IBS is doing ok probiotics and magnesium, though has been having some issues with constipation, and fluid.    Continues to have chronic neck pain, which is bad, but is livable, not waking her up anymore.  Still using a topical cream and flexeril if it gets too bad.     Right knee and inner right groin has been bothering her some if she steps wrong sometimes. Had Xrays done and showed mild arthritis. Continues to walk for exercise, about half mile.  Takes hydrocodone as needed, #60 has lasted 6 months, ibuprofen helps.    ---------------------------------------------------------------------  Pt had cscope in 10/2019, had polyps and has to repeat 2-3 years  Last mammogram UTD.       No visits with results within 6 Month(s) from this visit.   Latest known visit with results is:   No results found for any previous visit.       Past Medical History:   Diagnosis Date    ADHD (attention deficit hyperactivity disorder)     Anxiety     Arthritis     Colon polyps     Depression     Hyperlipidemia     Insomnia     Memory difficulties      Past Surgical History:   Procedure Laterality Date    TONSILLECTOMY       Family History   Problem Relation Age of Onset    Emphysema Mother     Diabetes Father     Breast cancer Maternal Aunt        Marital Status:   Alcohol History:  reports that she  drank alcohol.  Tobacco History:  reports that she has been smoking. She has been smoking about 0.25 packs per day. She has never used smokeless tobacco.  Drug History:  reports that she does not use drugs.    Review of patient's allergies indicates:  No Known Allergies    Current Outpatient Medications:     ALPRAZolam (XANAX) 1 MG tablet, Take 1 tablet (1 mg total) by mouth 2 (two) times daily as needed for Anxiety., Disp: 90 tablet, Rfl: 0    ARIPiprazole (ABILIFY) 20 MG Tab, Take 1 tablet (20 mg total) by mouth once daily., Disp: 90 tablet, Rfl: 1    atorvastatin (LIPITOR) 20 MG tablet, Take 1 tablet (20 mg total) by mouth once daily., Disp: 90 tablet, Rfl: 0    dextroamphetamine-amphetamine (ADDERALL) 20 mg tablet, Take 1 tablet by mouth once daily., Disp: 90 tablet, Rfl: 0    doxepin (SINEQUAN) 25 MG capsule, Take 1-2 capsules (25-50 mg total) by mouth every evening., Disp: 180 capsule, Rfl: 0    estradioL (ESTRACE) 0.01 % (0.1 mg/gram) vaginal cream, Place 4 g vaginally twice a week., Disp: 3 Tube, Rfl: 1    HYDROcodone-acetaminophen (NORCO) 5-325 mg per tablet, Take 1 tablet by mouth every 12 (twelve) hours as needed., Disp: 60 tablet, Rfl: 0    Lactobacillus rhamnosus GG (CULTURELLE) 10 billion cell capsule, Take 1 capsule by mouth once daily., Disp: , Rfl:     magnesium oxide (MAG-OX) 400 mg (241.3 mg magnesium) tablet, Take 400 mg by mouth once daily., Disp: , Rfl:     TRINTELLIX 20 mg Tab, Take 1 tablet by mouth once daily., Disp: , Rfl:     zolpidem (AMBIEN) 10 mg Tab, Take 2 tablets (20 mg total) by mouth every evening., Disp: 180 tablet, Rfl: 0    Review of Systems   Constitutional: Negative for appetite change, fatigue, fever and unexpected weight change.   Respiratory: Negative for cough, chest tightness, shortness of breath and wheezing.    Cardiovascular: Negative for chest pain and leg swelling.   Gastrointestinal: Negative for abdominal pain, constipation, nausea and vomiting.         "-heartburn   Genitourinary: Negative for difficulty urinating, dysuria, frequency and urgency.   Musculoskeletal: Positive for arthralgias (right knee) and neck pain. Negative for back pain and myalgias.   Skin: Negative for rash.   Neurological: Positive for numbness. Negative for dizziness, weakness and headaches.   Hematological: Does not bruise/bleed easily.   Psychiatric/Behavioral: Positive for dysphoric mood and sleep disturbance. Negative for suicidal ideas. The patient is not nervous/anxious.    All other systems reviewed and are negative.         Objective:      Vitals:    06/10/20 1053   BP: 124/82   Pulse: 108   Temp: 97.9 °F (36.6 °C)   SpO2: 95%   Weight: 86.5 kg (190 lb 9.6 oz)   Height: 5' 7" (1.702 m)     Body mass index is 29.85 kg/m².     Physical Exam   Constitutional: She is oriented to person, place, and time. She appears well-developed and well-nourished. No distress.   HENT:   Head: Normocephalic and atraumatic.   Neck: Neck supple. No thyromegaly present.   Cardiovascular: Normal rate, regular rhythm and normal heart sounds. Exam reveals no friction rub.   No murmur heard.  Pulmonary/Chest: Effort normal and breath sounds normal. She has no wheezes. She has no rales.   Abdominal: Soft. Bowel sounds are normal. She exhibits no distension. There is no tenderness.   Musculoskeletal: She exhibits no edema.   Lymphadenopathy:     She has no cervical adenopathy.   Neurological: She is alert and oriented to person, place, and time.   Skin: Skin is warm and dry. No rash noted.   Psychiatric: She has a normal mood and affect. Her speech is normal and behavior is normal. Judgment and thought content normal. She is attentive.   Vitals reviewed.        Assessment:       1. Chronic recurrent major depressive disorder    2. Panic disorder without agoraphobia    3. Attention deficit hyperactivity disorder (ADHD), predominantly inattentive type    4. Chronic insomnia    5. Cervical neuropathic pain    6. " Smoker    7. Long-term use of high-risk medication    8. Screening for ischemic heart disease (IHD)    9. Screening for blood or protein in urine         Plan:       Chronic recurrent major depressive disorder  Comments:  Situational. To limit input that is bothersome, and to continue regular exercise. Will continue to monitor symptoms    Panic disorder without agoraphobia  Comments:  Symptomatic. To continue Xanax as needed. Will continue to monitor symptoms    Attention deficit hyperactivity disorder (ADHD), predominantly inattentive type  Comments:  Controlled. Will continue to monitor tolerability and effectiveness of adderall.    Chronic insomnia  Comments:  Stable. Will continue to monitor effectiveness.     Cervical neuropathic pain  Comments:  Stable. Will continue to monitor pain control and function.    Smoker  Comments:  Pt strongly encouraged to stop smoking. states the time is not right yet.    Long-term use of high-risk medication  -     Comprehensive metabolic panel; Future; Expected date: 06/10/2020  -     Lipid Panel; Future; Expected date: 06/10/2020  -     Urinalysis; Future; Expected date: 06/10/2020  -     CBC auto differential; Future; Expected date: 06/10/2020    Screening for ischemic heart disease (IHD)  -     Lipid Panel; Future; Expected date: 06/10/2020    Screening for blood or protein in urine  -     Urinalysis; Future; Expected date: 06/10/2020    Labs have been ordered for monitoring of chronic conditions, just before next visit.    Follow up in about 3 months (around 9/10/2020) for Depression, Anxiety, ADD, Insomnia, Arthritis, Chronic Pain.

## 2020-08-26 ENCOUNTER — TELEPHONE (OUTPATIENT)
Dept: FAMILY MEDICINE | Facility: CLINIC | Age: 65
End: 2020-08-26

## 2020-09-05 LAB
ALBUMIN SERPL-MCNC: 4.5 G/DL (ref 3.6–5.1)
ALBUMIN/GLOB SERPL: 1.8 (CALC) (ref 1–2.5)
ALP SERPL-CCNC: 103 U/L (ref 37–153)
ALT SERPL-CCNC: 31 U/L (ref 6–29)
APPEARANCE UR: ABNORMAL
AST SERPL-CCNC: 29 U/L (ref 10–35)
BASOPHILS # BLD AUTO: 118 CELLS/UL (ref 0–200)
BASOPHILS NFR BLD AUTO: 1.3 %
BILIRUB SERPL-MCNC: 0.7 MG/DL (ref 0.2–1.2)
BILIRUB UR QL STRIP: NEGATIVE
BUN SERPL-MCNC: 14 MG/DL (ref 7–25)
BUN/CREAT SERPL: 12 (CALC) (ref 6–22)
CALCIUM SERPL-MCNC: 9.9 MG/DL (ref 8.6–10.4)
CHLORIDE SERPL-SCNC: 107 MMOL/L (ref 98–110)
CHOLEST SERPL-MCNC: 196 MG/DL
CHOLEST/HDLC SERPL: 4.4 (CALC)
CO2 SERPL-SCNC: 27 MMOL/L (ref 20–32)
COLOR UR: YELLOW
CREAT SERPL-MCNC: 1.17 MG/DL (ref 0.5–0.99)
EOSINOPHIL # BLD AUTO: 328 CELLS/UL (ref 15–500)
EOSINOPHIL NFR BLD AUTO: 3.6 %
ERYTHROCYTE [DISTWIDTH] IN BLOOD BY AUTOMATED COUNT: 15 % (ref 11–15)
GFRSERPLBLD MDRD-ARVRAT: 49 ML/MIN/1.73M2
GLOBULIN SER CALC-MCNC: 2.5 G/DL (CALC) (ref 1.9–3.7)
GLUCOSE SERPL-MCNC: 92 MG/DL (ref 65–99)
GLUCOSE UR QL STRIP: NEGATIVE
HCT VFR BLD AUTO: 48.5 % (ref 35–45)
HDLC SERPL-MCNC: 45 MG/DL
HGB BLD-MCNC: 15.9 G/DL (ref 11.7–15.5)
HGB UR QL STRIP: ABNORMAL
KETONES UR QL STRIP: NEGATIVE
LDLC SERPL CALC-MCNC: 107 MG/DL (CALC)
LEUKOCYTE ESTERASE UR QL STRIP: ABNORMAL
LYMPHOCYTES # BLD AUTO: 3221 CELLS/UL (ref 850–3900)
LYMPHOCYTES NFR BLD AUTO: 35.4 %
MCH RBC QN AUTO: 29.3 PG (ref 27–33)
MCHC RBC AUTO-ENTMCNC: 32.8 G/DL (ref 32–36)
MCV RBC AUTO: 89.3 FL (ref 80–100)
MONOCYTES # BLD AUTO: 655 CELLS/UL (ref 200–950)
MONOCYTES NFR BLD AUTO: 7.2 %
NEUTROPHILS # BLD AUTO: 4778 CELLS/UL (ref 1500–7800)
NEUTROPHILS NFR BLD AUTO: 52.5 %
NITRITE UR QL STRIP: POSITIVE
NONHDLC SERPL-MCNC: 151 MG/DL (CALC)
PH UR STRIP: ABNORMAL [PH] (ref 5–8)
PLATELET # BLD AUTO: 323 THOUSAND/UL (ref 140–400)
PMV BLD REES-ECKER: 10.1 FL (ref 7.5–12.5)
POTASSIUM SERPL-SCNC: 5 MMOL/L (ref 3.5–5.3)
PROT SERPL-MCNC: 7 G/DL (ref 6.1–8.1)
PROT UR QL STRIP: NEGATIVE
RBC # BLD AUTO: 5.43 MILLION/UL (ref 3.8–5.1)
SODIUM SERPL-SCNC: 143 MMOL/L (ref 135–146)
SP GR UR STRIP: 1.02 (ref 1–1.03)
TRIGL SERPL-MCNC: 328 MG/DL
WBC # BLD AUTO: 9.1 THOUSAND/UL (ref 3.8–10.8)

## 2020-09-10 ENCOUNTER — OFFICE VISIT (OUTPATIENT)
Dept: FAMILY MEDICINE | Facility: CLINIC | Age: 65
End: 2020-09-10
Payer: OTHER GOVERNMENT

## 2020-09-10 VITALS
SYSTOLIC BLOOD PRESSURE: 130 MMHG | BODY MASS INDEX: 29.79 KG/M2 | WEIGHT: 189.81 LBS | HEART RATE: 103 BPM | TEMPERATURE: 98 F | OXYGEN SATURATION: 95 % | DIASTOLIC BLOOD PRESSURE: 82 MMHG | HEIGHT: 67 IN

## 2020-09-10 DIAGNOSIS — E78.01 FAMILIAL HYPERCHOLESTEROLEMIA: ICD-10-CM

## 2020-09-10 DIAGNOSIS — R94.4 DECREASED CREATININE CLEARANCE: ICD-10-CM

## 2020-09-10 DIAGNOSIS — F33.9 CHRONIC RECURRENT MAJOR DEPRESSIVE DISORDER: Primary | ICD-10-CM

## 2020-09-10 DIAGNOSIS — F41.0 PANIC DISORDER WITHOUT AGORAPHOBIA: ICD-10-CM

## 2020-09-10 DIAGNOSIS — F90.0 ATTENTION DEFICIT HYPERACTIVITY DISORDER (ADHD), PREDOMINANTLY INATTENTIVE TYPE: ICD-10-CM

## 2020-09-10 DIAGNOSIS — N95.2 ATROPHIC VAGINITIS: ICD-10-CM

## 2020-09-10 DIAGNOSIS — F51.04 CHRONIC INSOMNIA: ICD-10-CM

## 2020-09-10 DIAGNOSIS — N30.00 ACUTE CYSTITIS WITHOUT HEMATURIA: ICD-10-CM

## 2020-09-10 DIAGNOSIS — Z28.21 INFLUENZA VACCINE REFUSED: ICD-10-CM

## 2020-09-10 PROCEDURE — 99214 PR OFFICE/OUTPT VISIT, EST, LEVL IV, 30-39 MIN: ICD-10-PCS | Mod: S$GLB,,, | Performed by: FAMILY MEDICINE

## 2020-09-10 PROCEDURE — 99214 OFFICE O/P EST MOD 30 MIN: CPT | Mod: S$GLB,,, | Performed by: FAMILY MEDICINE

## 2020-09-10 RX ORDER — DEXTROAMPHETAMINE SACCHARATE, AMPHETAMINE ASPARTATE, DEXTROAMPHETAMINE SULFATE AND AMPHETAMINE SULFATE 5; 5; 5; 5 MG/1; MG/1; MG/1; MG/1
1 TABLET ORAL DAILY
Qty: 90 TABLET | Refills: 0 | Status: SHIPPED | OUTPATIENT
Start: 2020-09-10 | End: 2020-11-30 | Stop reason: SDUPTHER

## 2020-09-10 RX ORDER — FLUOXETINE HYDROCHLORIDE 40 MG/1
40 CAPSULE ORAL DAILY
Qty: 90 CAPSULE | Refills: 1 | Status: SHIPPED | OUTPATIENT
Start: 2020-09-10 | End: 2021-03-01 | Stop reason: SDUPTHER

## 2020-09-10 RX ORDER — ATORVASTATIN CALCIUM 20 MG/1
20 TABLET, FILM COATED ORAL DAILY
Qty: 90 TABLET | Refills: 1 | Status: SHIPPED | OUTPATIENT
Start: 2020-09-10 | End: 2021-06-01 | Stop reason: SDUPTHER

## 2020-09-10 RX ORDER — CYCLOBENZAPRINE HCL 10 MG
1 TABLET ORAL 3 TIMES DAILY PRN
COMMUNITY
Start: 2018-05-23 | End: 2020-11-30 | Stop reason: SDUPTHER

## 2020-09-10 RX ORDER — ALPRAZOLAM 1 MG/1
1 TABLET ORAL 2 TIMES DAILY PRN
Qty: 90 TABLET | Refills: 0 | Status: SHIPPED | OUTPATIENT
Start: 2020-09-10 | End: 2020-11-30 | Stop reason: SDUPTHER

## 2020-09-10 RX ORDER — DOXEPIN HYDROCHLORIDE 25 MG/1
25-50 CAPSULE ORAL NIGHTLY
Qty: 180 CAPSULE | Refills: 1 | Status: SHIPPED | OUTPATIENT
Start: 2020-09-10 | End: 2020-11-30 | Stop reason: SDUPTHER

## 2020-09-10 RX ORDER — ESTRADIOL 0.1 MG/G
4 CREAM VAGINAL
Qty: 3 TUBE | Refills: 1 | Status: SHIPPED | OUTPATIENT
Start: 2020-09-10 | End: 2021-06-01 | Stop reason: SDUPTHER

## 2020-09-10 RX ORDER — ZOLPIDEM TARTRATE 10 MG/1
20 TABLET ORAL NIGHTLY
Qty: 180 TABLET | Refills: 1 | Status: SHIPPED | OUTPATIENT
Start: 2020-09-10 | End: 2020-11-30 | Stop reason: SDUPTHER

## 2020-09-10 RX ORDER — CEFUROXIME AXETIL 500 MG/1
500 TABLET ORAL EVERY 12 HOURS
Qty: 10 TABLET | Refills: 0 | Status: SHIPPED | OUTPATIENT
Start: 2020-09-10 | End: 2020-09-15

## 2020-09-10 NOTE — PROGRESS NOTES
SUBJECTIVE:    Patient ID: Stephanie Knox is a 65 y.o. female.    Chief Complaint: Depression (follow up) and Bottles brought    Pt here to checkup on chronic conditions.    Anxiety and depression is bad.  Still having a hard time dealing with everything going on in the world.  Has been on trintellix for about 3 years, along with the abilify. Only taking xanax once a day. Feels mostly once a day. Crying at stupid things, not wanting to talk to people. Not wanting to leave the house.  Was rough leaving the house to come to the doctor today.  does the shopping. Supposed to visit her daughter in [place] on Monday and she wants to go see her granddaughter (10 year old) play volleyball, hoping that will help.  is supposed to be driving.  Has had trouble with leaving the house prior to COVID.  Has been a few years since she has seen a psychiatrist. Has called , and has not been able to find one that will take her due to age, location, or other insurance issues.   Has done biofeedback and psychology in the past, and that doesn't seem to help. The depression just seems to come back and overwhelm.   Focus is doing well on adderall as needed, about 2 days per week, when she is going to travel or needs to get a lot of things done.     Sleeping well on ambien and doxepin, 5-7 hours. (failed trazodone, nightmares)    IBS is doing ok probiotics (culturelle) and magnesium.    Continues to have chronic neck pain, which is bad, but is livable, not waking her up anymore.  Still using a topical cream and flexeril if it gets too bad.     Right knee and inner right groin has been bothering her some if she steps wrong sometimes. Had Xrays done and showed mild arthritis. Continues to walk for exercise, about half mile.  Takes hydrocodone as needed, #60 has lasted 6 months, ibuprofen helps.    Had labs done. U/A showing  +blood/leukocytes/nitirites  ---------------------------------------------------------------------  Pt had cscope in 10/2019, had polyps and has to repeat 2-3 years  Last mammogram UTD.       Office Visit on 06/10/2020   Component Date Value Ref Range Status    Glucose 09/04/2020 92  65 - 99 mg/dL Final    BUN, Bld 09/04/2020 14  7 - 25 mg/dL Final    Creatinine 09/04/2020 1.17* 0.50 - 0.99 mg/dL Final    eGFR if non African American 09/04/2020 49* > OR = 60 mL/min/1.73m2 Final    eGFR if African American 09/04/2020 57* > OR = 60 mL/min/1.73m2 Final    BUN/Creatinine Ratio 09/04/2020 12  6 - 22 (calc) Final    Sodium 09/04/2020 143  135 - 146 mmol/L Final    Potassium 09/04/2020 5.0  3.5 - 5.3 mmol/L Final    Chloride 09/04/2020 107  98 - 110 mmol/L Final    CO2 09/04/2020 27  20 - 32 mmol/L Final    Calcium 09/04/2020 9.9  8.6 - 10.4 mg/dL Final    Total Protein 09/04/2020 7.0  6.1 - 8.1 g/dL Final    Albumin 09/04/2020 4.5  3.6 - 5.1 g/dL Final    Globulin, Total 09/04/2020 2.5  1.9 - 3.7 g/dL (calc) Final    Albumin/Globulin Ratio 09/04/2020 1.8  1.0 - 2.5 (calc) Final    Total Bilirubin 09/04/2020 0.7  0.2 - 1.2 mg/dL Final    Alkaline Phosphatase 09/04/2020 103  37 - 153 U/L Final    AST 09/04/2020 29  10 - 35 U/L Final    ALT 09/04/2020 31* 6 - 29 U/L Final    Cholesterol 09/04/2020 196  <200 mg/dL Final    HDL 09/04/2020 45* > OR = 50 mg/dL Final    Triglycerides 09/04/2020 328* <150 mg/dL Final    LDL Cholesterol 09/04/2020 107* mg/dL (calc) Final    Hdl/Cholesterol Ratio 09/04/2020 4.4  <5.0 (calc) Final    Non HDL Chol. (LDL+VLDL) 09/04/2020 151* <130 mg/dL (calc) Final    WBC 09/04/2020 9.1  3.8 - 10.8 Thousand/uL Final    RBC 09/04/2020 5.43* 3.80 - 5.10 Million/uL Final    Hemoglobin 09/04/2020 15.9* 11.7 - 15.5 g/dL Final    Hematocrit 09/04/2020 48.5* 35.0 - 45.0 % Final    Mean Corpuscular Volume 09/04/2020 89.3  80.0 - 100.0 fL Final    Mean Corpuscular Hemoglobin  09/04/2020 29.3  27.0 - 33.0 pg Final    Mean Corpuscular Hemoglobin Conc 09/04/2020 32.8  32.0 - 36.0 g/dL Final    RDW 09/04/2020 15.0  11.0 - 15.0 % Final    Platelets 09/04/2020 323  140 - 400 Thousand/uL Final    MPV 09/04/2020 10.1  7.5 - 12.5 fL Final    Neutrophils Absolute 09/04/2020 4,778  1,500 - 7,800 cells/uL Final    Lymph # 09/04/2020 3,221  850 - 3,900 cells/uL Final    Mono # 09/04/2020 655  200 - 950 cells/uL Final    Eos # 09/04/2020 328  15 - 500 cells/uL Final    Baso # 09/04/2020 118  0 - 200 cells/uL Final    Neutrophils Relative 09/04/2020 52.5  % Final    Lymph% 09/04/2020 35.4  % Final    Mono% 09/04/2020 7.2  % Final    Eosinophil% 09/04/2020 3.6  % Final    Basophil% 09/04/2020 1.3  % Final    Color, UA 09/04/2020 YELLOW  YELLOW Final    Appearance, UA 09/04/2020 CLOUDY* CLEAR Final    Specific Pyote, UA 09/04/2020 1.017  1.001 - 1.035 Final    pH, UA 09/04/2020 < OR = 5.0  5.0 - 8.0 Final    Glucose, UA 09/04/2020 NEGATIVE  NEGATIVE Final    Bilirubin, UA 09/04/2020 NEGATIVE  NEGATIVE Final    Ketones, UA 09/04/2020 NEGATIVE  NEGATIVE Final    Occult Blood UA 09/04/2020 TRACE* NEGATIVE Final    Protein, UA 09/04/2020 NEGATIVE  NEGATIVE Final    Nitrite, UA 09/04/2020 POSITIVE* NEGATIVE Final    Leukocytes, UA 09/04/2020 2+* NEGATIVE Final       Past Medical History:   Diagnosis Date    ADHD (attention deficit hyperactivity disorder)     Anxiety     Arthritis     Colon polyps     Depression     Hyperlipidemia     Insomnia     Memory difficulties      Social History     Socioeconomic History    Marital status:      Spouse name: Not on file    Number of children: Not on file    Years of education: Not on file    Highest education level: Not on file   Occupational History    Not on file   Social Needs    Financial resource strain: Not very hard    Food insecurity     Worry: Never true     Inability: Never true    Transportation needs      Medical: No     Non-medical: No   Tobacco Use    Smoking status: Current Every Day Smoker     Packs/day: 0.25    Smokeless tobacco: Never Used   Substance and Sexual Activity    Alcohol use: Not Currently     Frequency: Monthly or less     Drinks per session: 1 or 2     Binge frequency: Never    Drug use: Never    Sexual activity: Not on file   Lifestyle    Physical activity     Days per week: 0 days     Minutes per session: Not on file    Stress: Very much   Relationships    Social connections     Talks on phone: Twice a week     Gets together: Once a week     Attends [employer]ical service: Not on file     Active member of club or organization: Yes     Attends meetings of clubs or organizations: More than 4 times per year     Relationship status:    Other Topics Concern    Not on file   Social History Narrative    Not on file     Past Surgical History:   Procedure Laterality Date    TONSILLECTOMY       Family History   Problem Relation Age of Onset    Emphysema Mother     Diabetes Father     Breast cancer Maternal Aunt        Review of patient's allergies indicates:  No Known Allergies    Current Outpatient Medications:     ALPRAZolam (XANAX) 1 MG tablet, Take 1 tablet (1 mg total) by mouth 2 (two) times daily as needed for Anxiety., Disp: 90 tablet, Rfl: 0    ARIPiprazole (ABILIFY) 20 MG Tab, Take 1 tablet (20 mg total) by mouth once daily., Disp: 90 tablet, Rfl: 1    atorvastatin (LIPITOR) 20 MG tablet, Take 1 tablet (20 mg total) by mouth once daily., Disp: 90 tablet, Rfl: 1    cyclobenzaprine (FLEXERIL) 10 MG tablet, Take 1 tablet by mouth 3 (three) times daily as needed., Disp: , Rfl:     dextroamphetamine-amphetamine (ADDERALL) 20 mg tablet, Take 1 tablet by mouth once daily., Disp: 90 tablet, Rfl: 0    doxepin (SINEQUAN) 25 MG capsule, Take 1-2 capsules (25-50 mg total) by mouth every evening., Disp: 180 capsule, Rfl: 1    estradioL (ESTRACE) 0.01 % (0.1 mg/gram) vaginal cream, Place  "4 g vaginally twice a week., Disp: 3 Tube, Rfl: 1    HYDROcodone-acetaminophen (NORCO) 5-325 mg per tablet, Take 1 tablet by mouth every 12 (twelve) hours as needed., Disp: 60 tablet, Rfl: 0    Lactobacillus rhamnosus GG (CULTURELLE) 10 billion cell capsule, Take 1 capsule by mouth once daily., Disp: , Rfl:     magnesium oxide (MAG-OX) 400 mg (241.3 mg magnesium) tablet, Take 400 mg by mouth once daily., Disp: , Rfl:     TRINTELLIX 20 mg Tab, Take 1 tablet by mouth once daily., Disp: , Rfl:     zolpidem (AMBIEN) 10 mg Tab, Take 2 tablets (20 mg total) by mouth every evening., Disp: 180 tablet, Rfl: 1    FLUoxetine 40 MG capsule, Take 1 capsule (40 mg total) by mouth once daily., Disp: 90 capsule, Rfl: 1    levomefolate-algal oil (DEPLIN, ALGAL OIL,) 7.5-90.314 mg Cap, Take 1 capsule by mouth once daily., Disp: 90 capsule, Rfl: 1    Review of Systems   Constitutional: Negative for appetite change, fatigue, fever and unexpected weight change.   Respiratory: Negative for cough, chest tightness, shortness of breath and wheezing.    Cardiovascular: Negative for chest pain and leg swelling.   Gastrointestinal: Negative for abdominal pain, constipation, nausea and vomiting.        -heartburn   Genitourinary: Negative for difficulty urinating, dysuria, frequency and urgency.   Musculoskeletal: Positive for arthralgias (right knee) and neck pain. Negative for back pain and myalgias.   Skin: Negative for rash.   Neurological: Positive for numbness. Negative for dizziness, weakness and headaches.   Hematological: Does not bruise/bleed easily.   Psychiatric/Behavioral: Positive for dysphoric mood and sleep disturbance. Negative for suicidal ideas. The patient is not nervous/anxious.    All other systems reviewed and are negative.         Objective:      Vitals:    09/10/20 1044   BP: 130/82   Pulse: 103   Temp: 98 °F (36.7 °C)   SpO2: 95%   Weight: 86.1 kg (189 lb 12.8 oz)   Height: 5' 7" (1.702 m)     Physical " Exam  Vitals signs reviewed.   Constitutional:       General: She is not in acute distress.     Appearance: Normal appearance. She is well-developed. She is obese.   HENT:      Head: Normocephalic and atraumatic.   Neck:      Musculoskeletal: Neck supple.      Thyroid: No thyromegaly.   Cardiovascular:      Rate and Rhythm: Normal rate and regular rhythm.      Heart sounds: Normal heart sounds. No murmur. No friction rub.   Pulmonary:      Effort: Pulmonary effort is normal.      Breath sounds: Normal breath sounds. No wheezing or rales.   Abdominal:      General: Bowel sounds are normal. There is no distension.      Palpations: Abdomen is soft.      Tenderness: There is no abdominal tenderness.   Lymphadenopathy:      Cervical: No cervical adenopathy.   Skin:     General: Skin is warm and dry.      Findings: No rash.   Neurological:      Mental Status: She is alert and oriented to person, place, and time.   Psychiatric:         Attention and Perception: She is attentive.         Speech: Speech normal.         Behavior: Behavior normal.         Thought Content: Thought content normal.         Judgment: Judgment normal.           Assessment:       1. Chronic recurrent major depressive disorder    2. Atrophic vaginitis    3. Familial hypercholesterolemia    4. Decreased creatinine clearance    5. Acute cystitis without hematuria    6. Chronic insomnia    7. Attention deficit hyperactivity disorder (ADHD), predominantly inattentive type    8. Panic disorder without agoraphobia    9. Influenza vaccine refused         Plan:       Chronic recurrent major depressive disorder  Comments:  Symptomatic. Encouraged to establish care with psychology  Orders:  -     levomefolate-algal oil (DEPLIN, ALGAL OIL,) 7.5-90.314 mg Cap; Take 1 capsule by mouth once daily.  Dispense: 90 capsule; Refill: 1  -     FLUoxetine 40 MG capsule; Take 1 capsule (40 mg total) by mouth once daily.  Dispense: 90 capsule; Refill: 1    Atrophic  vaginitis  Comments:  Chronic. Doing well on estrogen replacement  Orders:  -     estradioL (ESTRACE) 0.01 % (0.1 mg/gram) vaginal cream; Place 4 g vaginally twice a week.  Dispense: 3 Tube; Refill: 1    Familial hypercholesterolemia  Comments:  Active. Statin reordered for pt today. Labs ordered for monitoring on chronic condition  Orders:  -     atorvastatin (LIPITOR) 20 MG tablet; Take 1 tablet (20 mg total) by mouth once daily.  Dispense: 90 tablet; Refill: 1    Decreased creatinine clearance    Acute cystitis without hematuria  Comments:  Symptomatic. Will tx with anbx.  Orders:  -     cefUROXime (CEFTIN) 500 MG tablet; Take 1 tablet (500 mg total) by mouth every 12 (twelve) hours. for 5 days  Dispense: 10 tablet; Refill: 0    Chronic insomnia  Comments:  Improved. To continue on ambien/doxepin. Will continue on monitor symptoms.  Orders:  -     zolpidem (AMBIEN) 10 mg Tab; Take 2 tablets (20 mg total) by mouth every evening.  Dispense: 180 tablet; Refill: 1  -     doxepin (SINEQUAN) 25 MG capsule; Take 1-2 capsules (25-50 mg total) by mouth every evening.  Dispense: 180 capsule; Refill: 1    Attention deficit hyperactivity disorder (ADHD), predominantly inattentive type  Comments:  Focus controlled. On prn Adderall.   Orders:  -     dextroamphetamine-amphetamine (ADDERALL) 20 mg tablet; Take 1 tablet by mouth once daily.  Dispense: 90 tablet; Refill: 0    Panic disorder without agoraphobia  Comments:  Controlled. On trintellex and prn Xanax. Will continue to monitor for symptoms  Orders:  -     ALPRAZolam (XANAX) 1 MG tablet; Take 1 tablet (1 mg total) by mouth 2 (two) times daily as needed for Anxiety.  Dispense: 90 tablet; Refill: 0    Influenza vaccine refused    Other  Lab results discussed and reviewed with patient.    Follow up in about 3 months (around 12/10/2020) for Anxiety, Insomnia, Depression, ADD.        9/27/2020 Eron Hill

## 2020-11-30 ENCOUNTER — OFFICE VISIT (OUTPATIENT)
Dept: FAMILY MEDICINE | Facility: CLINIC | Age: 65
End: 2020-11-30
Payer: OTHER GOVERNMENT

## 2020-11-30 VITALS
HEART RATE: 93 BPM | SYSTOLIC BLOOD PRESSURE: 116 MMHG | DIASTOLIC BLOOD PRESSURE: 82 MMHG | TEMPERATURE: 98 F | WEIGHT: 192.63 LBS | BODY MASS INDEX: 30.17 KG/M2

## 2020-11-30 DIAGNOSIS — Z13.6 SCREENING FOR ISCHEMIC HEART DISEASE (IHD): ICD-10-CM

## 2020-11-30 DIAGNOSIS — R30.0 DYSURIA: ICD-10-CM

## 2020-11-30 DIAGNOSIS — Z79.899 LONG-TERM USE OF HIGH-RISK MEDICATION: ICD-10-CM

## 2020-11-30 DIAGNOSIS — F90.0 ATTENTION DEFICIT HYPERACTIVITY DISORDER (ADHD), PREDOMINANTLY INATTENTIVE TYPE: ICD-10-CM

## 2020-11-30 DIAGNOSIS — M54.12 CERVICAL NEUROPATHIC PAIN: Primary | ICD-10-CM

## 2020-11-30 DIAGNOSIS — F41.0 PANIC DISORDER WITHOUT AGORAPHOBIA: ICD-10-CM

## 2020-11-30 DIAGNOSIS — F51.04 CHRONIC INSOMNIA: ICD-10-CM

## 2020-11-30 LAB
BILIRUB SERPL-MCNC: NEGATIVE MG/DL
BLOOD URINE, POC: NEGATIVE
CLARITY, POC UA: CLEAR
COLOR, POC UA: YELLOW
GLUCOSE UR QL STRIP: NEGATIVE
KETONES UR QL STRIP: NEGATIVE
LEUKOCYTE ESTERASE URINE, POC: NEGATIVE
NITRITE, POC UA: NEGATIVE
PH, POC UA: 6.5
PROTEIN, POC: NEGATIVE
SPECIFIC GRAVITY, POC UA: 1.01
UROBILINOGEN, POC UA: 0.2

## 2020-11-30 PROCEDURE — 81002 URINALYSIS NONAUTO W/O SCOPE: CPT | Mod: S$GLB,,, | Performed by: FAMILY MEDICINE

## 2020-11-30 PROCEDURE — 99214 PR OFFICE/OUTPT VISIT, EST, LEVL IV, 30-39 MIN: ICD-10-PCS | Mod: 25,S$GLB,, | Performed by: FAMILY MEDICINE

## 2020-11-30 PROCEDURE — 99214 OFFICE O/P EST MOD 30 MIN: CPT | Mod: 25,S$GLB,, | Performed by: FAMILY MEDICINE

## 2020-11-30 PROCEDURE — 81002 POCT URINE DIPSTICK WITHOUT MICROSCOPE: ICD-10-PCS | Mod: S$GLB,,, | Performed by: FAMILY MEDICINE

## 2020-11-30 RX ORDER — ZOLPIDEM TARTRATE 10 MG/1
20 TABLET ORAL NIGHTLY
Qty: 180 TABLET | Refills: 1 | Status: SHIPPED | OUTPATIENT
Start: 2020-11-30 | End: 2021-06-01 | Stop reason: SDUPTHER

## 2020-11-30 RX ORDER — DOXEPIN HYDROCHLORIDE 50 MG/1
50-100 CAPSULE ORAL NIGHTLY
Qty: 180 CAPSULE | Refills: 1 | Status: SHIPPED | OUTPATIENT
Start: 2020-11-30 | End: 2021-06-01 | Stop reason: SDUPTHER

## 2020-11-30 RX ORDER — CYCLOBENZAPRINE HCL 10 MG
10 TABLET ORAL 3 TIMES DAILY PRN
Qty: 270 TABLET | Refills: 1 | Status: SHIPPED | OUTPATIENT
Start: 2020-11-30 | End: 2021-06-01 | Stop reason: SDUPTHER

## 2020-11-30 RX ORDER — PHENAZOPYRIDINE HYDROCHLORIDE 200 MG/1
200 TABLET, FILM COATED ORAL 3 TIMES DAILY PRN
Qty: 10 TABLET | Refills: 0 | Status: SHIPPED | OUTPATIENT
Start: 2020-11-30 | End: 2020-12-05

## 2020-11-30 RX ORDER — HYDROCODONE BITARTRATE AND ACETAMINOPHEN 5; 325 MG/1; MG/1
1 TABLET ORAL EVERY 12 HOURS PRN
Qty: 60 TABLET | Refills: 0 | Status: SHIPPED | OUTPATIENT
Start: 2020-11-30 | End: 2021-06-01 | Stop reason: SDUPTHER

## 2020-11-30 RX ORDER — DEXTROAMPHETAMINE SACCHARATE, AMPHETAMINE ASPARTATE, DEXTROAMPHETAMINE SULFATE AND AMPHETAMINE SULFATE 5; 5; 5; 5 MG/1; MG/1; MG/1; MG/1
1 TABLET ORAL DAILY
Qty: 90 TABLET | Refills: 0 | Status: SHIPPED | OUTPATIENT
Start: 2020-11-30 | End: 2021-03-01 | Stop reason: SDUPTHER

## 2020-11-30 RX ORDER — ALPRAZOLAM 1 MG/1
1 TABLET ORAL NIGHTLY PRN
Qty: 90 TABLET | Refills: 0 | Status: SHIPPED | OUTPATIENT
Start: 2020-11-30 | End: 2021-03-01 | Stop reason: SDUPTHER

## 2020-11-30 NOTE — PROGRESS NOTES
SUBJECTIVE:    Patient ID: Stephanie Knox is a 65 y.o. female.    Chief Complaint: Insomnia (flu UTD / pneumo UTD per pt / zoster UTD-JE)    Pt here to checkup on chronic conditions.    Anxiety and depression is doing ok.  Had trouble getting up to her son's.  Lives in [place]. Her  drove. But once she got there, was good.   Has been doing better since she has better since her meds were changed trintellix to prozac, and left her on the abilify.  Could not afford folbic, and the pharmacy gave her a good substitute.  bad.  Still having a hard time dealing with everything going on in the world. Her mood has been real good the last 3 weeks. Her outlook has changed for the better. Actually looking forward to the holidays, which she usually hates. (Has done biofeedback and psychology in the past, and that doesn't seem to help. )      Anxiety is doing ok, taking xanax at night.   No longer crying all the time.     Focus is doing well on adderall as needed. Still has issues with short term memory.     Sleeping well on ambien and doxepin, getting a good 6 hours. (failed trazodone, nightmares)    IBS is doing ok probiotics (culturelle) and magnesium.    Continues to have chronic neck pain, which she is able to tolerate.   Still using a topical cream and flexeril if it gets too bad.     Right knee and inner right groin has been bothering her some if she steps wrong sometimes. Had Xrays done and showed mild arthritis. Continues to walk for exercise, about half mile.  Takes hydrocodone as needed, #60 has lasted 6 months, ibuprofen helps.    Has been having trouble emptying bladder, frequency and dysuria.  U/A  Is normal.   ---------------------------------------------------------------------  Pt had cscope in 10/2019, had polyps and has to repeat 2-3 years  Last mammogram UTD.       Office Visit on 11/30/2020   Component Date Value Ref Range Status    Color, UA 11/30/2020 Yellow   Final    pH, UA 11/30/2020 6.5   Final     WBC, UA 11/30/2020 negative   Final    Nitrite, UA 11/30/2020 negative   Final    Protein, UA 11/30/2020 negative   Final    Glucose, UA 11/30/2020 negative   Final    Ketones, UA 11/30/2020 negative   Final    Urobilinogen, UA 11/30/2020 0.2   Final    Bilirubin, UA 11/30/2020 negative   Final    Blood, UA 11/30/2020 negative   Final    Clarity, UA 11/30/2020 Clear   Final    Spec Grav UA 11/30/2020 1.010   Final    Urine Culture, Routine 11/30/2020 *  Final   Office Visit on 06/10/2020   Component Date Value Ref Range Status    Glucose 09/04/2020 92  65 - 99 mg/dL Final    BUN 09/04/2020 14  7 - 25 mg/dL Final    Creatinine 09/04/2020 1.17* 0.50 - 0.99 mg/dL Final    eGFR if non African American 09/04/2020 49* > OR = 60 mL/min/1.73m2 Final    eGFR if African American 09/04/2020 57* > OR = 60 mL/min/1.73m2 Final    BUN/Creatinine Ratio 09/04/2020 12  6 - 22 (calc) Final    Sodium 09/04/2020 143  135 - 146 mmol/L Final    Potassium 09/04/2020 5.0  3.5 - 5.3 mmol/L Final    Chloride 09/04/2020 107  98 - 110 mmol/L Final    CO2 09/04/2020 27  20 - 32 mmol/L Final    Calcium 09/04/2020 9.9  8.6 - 10.4 mg/dL Final    Total Protein 09/04/2020 7.0  6.1 - 8.1 g/dL Final    Albumin 09/04/2020 4.5  3.6 - 5.1 g/dL Final    Globulin, Total 09/04/2020 2.5  1.9 - 3.7 g/dL (calc) Final    Albumin/Globulin Ratio 09/04/2020 1.8  1.0 - 2.5 (calc) Final    Total Bilirubin 09/04/2020 0.7  0.2 - 1.2 mg/dL Final    Alkaline Phosphatase 09/04/2020 103  37 - 153 U/L Final    AST 09/04/2020 29  10 - 35 U/L Final    ALT 09/04/2020 31* 6 - 29 U/L Final    Cholesterol 09/04/2020 196  <200 mg/dL Final    HDL 09/04/2020 45* > OR = 50 mg/dL Final    Triglycerides 09/04/2020 328* <150 mg/dL Final    LDL Cholesterol 09/04/2020 107* mg/dL (calc) Final    HDL/Cholesterol Ratio 09/04/2020 4.4  <5.0 (calc) Final    Non HDL Chol. (LDL+VLDL) 09/04/2020 151* <130 mg/dL (calc) Final    WBC 09/04/2020 9.1  3.8 - 10.8  Thousand/uL Final    RBC 09/04/2020 5.43* 3.80 - 5.10 Million/uL Final    Hemoglobin 09/04/2020 15.9* 11.7 - 15.5 g/dL Final    Hematocrit 09/04/2020 48.5* 35.0 - 45.0 % Final    MCV 09/04/2020 89.3  80.0 - 100.0 fL Final    MCH 09/04/2020 29.3  27.0 - 33.0 pg Final    MCHC 09/04/2020 32.8  32.0 - 36.0 g/dL Final    RDW 09/04/2020 15.0  11.0 - 15.0 % Final    Platelets 09/04/2020 323  140 - 400 Thousand/uL Final    MPV 09/04/2020 10.1  7.5 - 12.5 fL Final    Neutrophils Absolute 09/04/2020 4,778  1,500 - 7,800 cells/uL Final    Lymph # 09/04/2020 3,221  850 - 3,900 cells/uL Final    Mono # 09/04/2020 655  200 - 950 cells/uL Final    Eos # 09/04/2020 328  15 - 500 cells/uL Final    Baso # 09/04/2020 118  0 - 200 cells/uL Final    Neutrophils Relative 09/04/2020 52.5  % Final    Lymph % 09/04/2020 35.4  % Final    Mono % 09/04/2020 7.2  % Final    Eosinophil % 09/04/2020 3.6  % Final    Basophil % 09/04/2020 1.3  % Final    Color, UA 09/04/2020 YELLOW  YELLOW Final    Appearance, UA 09/04/2020 CLOUDY* CLEAR Final    Specific [place], UA 09/04/2020 1.017  1.001 - 1.035 Final    pH, UA 09/04/2020 < OR = 5.0  5.0 - 8.0 Final    Glucose, UA 09/04/2020 NEGATIVE  NEGATIVE Final    Bilirubin, UA 09/04/2020 NEGATIVE  NEGATIVE Final    Ketones, UA 09/04/2020 NEGATIVE  NEGATIVE Final    Occult Blood UA 09/04/2020 TRACE* NEGATIVE Final    Protein, UA 09/04/2020 NEGATIVE  NEGATIVE Final    Nitrite, UA 09/04/2020 POSITIVE* NEGATIVE Final    Leukocytes, UA 09/04/2020 2+* NEGATIVE Final       Past Medical History:   Diagnosis Date    ADHD (attention deficit hyperactivity disorder)     Anxiety     Arthritis     Colon polyps     Depression     Hyperlipidemia     Insomnia     Memory difficulties      Social History     Socioeconomic History    Marital status:      Spouse name: Not on file    Number of children: Not on file    Years of education: Not on file    Highest education level:  Not on file   Occupational History    Not on file   Social Needs    Financial resource strain: Not very hard    Food insecurity     Worry: Never true     Inability: Never true    Transportation needs     Medical: No     Non-medical: No   Tobacco Use    Smoking status: Current Every Day Smoker     Packs/day: 0.25    Smokeless tobacco: Never Used   Substance and Sexual Activity    Alcohol use: Not Currently     Frequency: Monthly or less     Drinks per session: 1 or 2     Binge frequency: Never    Drug use: Never    Sexual activity: Not on file   Lifestyle    Physical activity     Days per week: 0 days     Minutes per session: Not on file    Stress: Very much   Relationships    Social connections     Talks on phone: Twice a week     Gets together: Once a week     Attends Religion service: Not on file     Active member of club or organization: Yes     Attends meetings of clubs or organizations: More than 4 times per year     Relationship status:    Other Topics Concern    Not on file   Social History Narrative    Not on file     Past Surgical History:   Procedure Laterality Date    TONSILLECTOMY       Family History   Problem Relation Age of Onset    Emphysema Mother     Diabetes Father     Breast cancer Maternal Aunt        Review of patient's allergies indicates:  No Known Allergies    Current Outpatient Medications:     ALPRAZolam (XANAX) 1 MG tablet, Take 1 tablet (1 mg total) by mouth nightly as needed for Anxiety., Disp: 90 tablet, Rfl: 0    ARIPiprazole (ABILIFY) 20 MG Tab, Take 1 tablet (20 mg total) by mouth once daily., Disp: 90 tablet, Rfl: 1    atorvastatin (LIPITOR) 20 MG tablet, Take 1 tablet (20 mg total) by mouth once daily., Disp: 90 tablet, Rfl: 1    cyclobenzaprine (FLEXERIL) 10 MG tablet, Take 1 tablet (10 mg total) by mouth 3 (three) times daily as needed., Disp: 270 tablet, Rfl: 1    dextroamphetamine-amphetamine (ADDERALL) 20 mg tablet, Take 1 tablet by mouth once  daily., Disp: 90 tablet, Rfl: 0    doxepin (SINEQUAN) 50 MG capsule, Take 1-2 capsules ( mg total) by mouth every evening., Disp: 180 capsule, Rfl: 1    estradioL (ESTRACE) 0.01 % (0.1 mg/gram) vaginal cream, Place 4 g vaginally twice a week., Disp: 3 Tube, Rfl: 1    FLUoxetine 40 MG capsule, Take 1 capsule (40 mg total) by mouth once daily., Disp: 90 capsule, Rfl: 1    HYDROcodone-acetaminophen (NORCO) 5-325 mg per tablet, Take 1 tablet by mouth every 12 (twelve) hours as needed., Disp: 60 tablet, Rfl: 0    Lactobacillus rhamnosus GG (CULTURELLE) 10 billion cell capsule, Take 1 capsule by mouth once daily., Disp: , Rfl:     levomefolate-algal oil (DEPLIN, ALGAL OIL,) 7.5-90.314 mg Cap, Take 1 capsule by mouth once daily., Disp: 90 capsule, Rfl: 1    magnesium oxide (MAG-OX) 400 mg (241.3 mg magnesium) tablet, Take 400 mg by mouth once daily., Disp: , Rfl:     zolpidem (AMBIEN) 10 mg Tab, Take 2 tablets (20 mg total) by mouth every evening., Disp: 180 tablet, Rfl: 1    TRINTELLIX 20 mg Tab, Take 1 tablet by mouth once daily., Disp: , Rfl:     Review of Systems   Constitutional: Negative for appetite change, fatigue, fever and unexpected weight change.   Respiratory: Negative for cough, chest tightness, shortness of breath and wheezing.    Cardiovascular: Negative for chest pain and leg swelling.   Gastrointestinal: Negative for abdominal pain, constipation, nausea and vomiting.        -heartburn   Genitourinary: Negative for difficulty urinating, dysuria, frequency and urgency.   Musculoskeletal: Positive for arthralgias (right knee) and neck pain. Negative for back pain and myalgias.   Skin: Negative for rash.   Neurological: Positive for numbness. Negative for dizziness, weakness and headaches.   Hematological: Does not bruise/bleed easily.   Psychiatric/Behavioral: Positive for dysphoric mood and sleep disturbance. Negative for suicidal ideas. The patient is not nervous/anxious.    All other  systems reviewed and are negative.         Objective:      Vitals:    11/30/20 1102   BP: 116/82   Pulse: 93   Temp: 98.3 °F (36.8 °C)   Weight: 87.4 kg (192 lb 9.6 oz)     Physical Exam  Vitals signs reviewed.   Constitutional:       General: She is not in acute distress.     Appearance: Normal appearance. She is well-developed. She is obese.   HENT:      Head: Normocephalic and atraumatic.   Neck:      Musculoskeletal: Neck supple.      Thyroid: No thyromegaly.   Cardiovascular:      Rate and Rhythm: Normal rate and regular rhythm.      Heart sounds: Normal heart sounds. No murmur. No friction rub.   Pulmonary:      Effort: Pulmonary effort is normal.      Breath sounds: Normal breath sounds. No wheezing or rales.   Abdominal:      General: Bowel sounds are normal. There is no distension.      Palpations: Abdomen is soft.      Tenderness: There is no abdominal tenderness.   Lymphadenopathy:      Cervical: No cervical adenopathy.   Skin:     General: Skin is warm and dry.      Findings: No rash.   Neurological:      Mental Status: She is alert and oriented to person, place, and time.   Psychiatric:         Attention and Perception: She is attentive.         Speech: Speech normal.         Behavior: Behavior normal.         Thought Content: Thought content normal.         Judgment: Judgment normal.           Assessment:       1. Cervical neuropathic pain    2. Panic disorder without agoraphobia    3. Attention deficit hyperactivity disorder (ADHD), predominantly inattentive type    4. Chronic insomnia    5. Dysuria    6. Long-term use of high-risk medication    7. Screening for ischemic heart disease (IHD)         Plan:       Cervical neuropathic pain  Comments:  Pain controlled. To continue conservative care with topical agents, prn norco.  Orders:  -     cyclobenzaprine (FLEXERIL) 10 MG tablet; Take 1 tablet (10 mg total) by mouth 3 (three) times daily as needed.  Dispense: 270 tablet; Refill: 1  -      HYDROcodone-acetaminophen (NORCO) 5-325 mg per tablet; Take 1 tablet by mouth every 12 (twelve) hours as needed.  Dispense: 60 tablet; Refill: 0  -     Pain Managment Profile 4, w/ Confirm, Ur; Future; Expected date: 12/27/2020    Panic disorder without agoraphobia  Comments:  Controlled. On prozac and prn Xanax. Will continue to monitor for symptoms  Orders:  -     ALPRAZolam (XANAX) 1 MG tablet; Take 1 tablet (1 mg total) by mouth nightly as needed for Anxiety.  Dispense: 90 tablet; Refill: 0  -     Pain Managment Profile 4, w/ Confirm, Ur; Future; Expected date: 12/27/2020    Attention deficit hyperactivity disorder (ADHD), predominantly inattentive type  Comments:  Focus controlled. On prn Adderall.   Orders:  -     dextroamphetamine-amphetamine (ADDERALL) 20 mg tablet; Take 1 tablet by mouth once daily.  Dispense: 90 tablet; Refill: 0    Chronic insomnia  Comments:  Improved. To continue on ambien/doxepin.  Discussed weaning off ambien and tapering up doxepin. Will continue on monitor symptoms.  Orders:  -     doxepin (SINEQUAN) 50 MG capsule; Take 1-2 capsules ( mg total) by mouth every evening.  Dispense: 180 capsule; Refill: 1  -     zolpidem (AMBIEN) 10 mg Tab; Take 2 tablets (20 mg total) by mouth every evening.  Dispense: 180 tablet; Refill: 1    Dysuria  Comments:  Controlled. U/A normal, will get culture. May need PVD, renal ultrasound  Orders:  -     POCT urine dipstick without microscope  -     phenazopyridine (PYRIDIUM) 200 MG tablet; Take 1 tablet (200 mg total) by mouth 3 (three) times daily as needed for Pain.  Dispense: 10 tablet; Refill: 0  -     Urine Culture, Routine  -     Urine culture    Long-term use of high-risk medication  -     Comprehensive Metabolic Panel; Future; Expected date: 12/27/2020  -     Lipid Panel; Future; Expected date: 12/27/2020  -     Urinalysis; Future; Expected date: 12/27/2020  -     CBC Auto Differential; Future; Expected date: 12/27/2020  -     Pain Managment  Profile 4, w/ Confirm, Ur; Future; Expected date: 12/27/2020    Screening for ischemic heart disease (IHD)  -     Comprehensive Metabolic Panel; Future; Expected date: 12/27/2020  -     Lipid Panel; Future; Expected date: 12/27/2020    Labs and/or tests have been ordered for the evaluation/monitoring of acute/chronic conditions, to be done just before next visit.    Chronic Pain Notes:  Have discussed the risks and benefits of chronic narcotic therapy including pain control, dependence, and addiction.  The patient is aware and accepts the risks and benefits. Patient is aware of the risk of taking narcotics combined with benzodiazepines/muscle relaxers/hypnotics, including but not limited to breathing difficulties, coma, and death; accepts the risks; and agrees to use medications only as prescribed.      Follow up in about 3 months (around 2/28/2021) for Anxiety, Arthritis, ADD, Insomnia.        12/27/2020 [place]

## 2020-12-02 LAB — BACTERIA UR CULT: ABNORMAL

## 2020-12-03 ENCOUNTER — TELEPHONE (OUTPATIENT)
Dept: FAMILY MEDICINE | Facility: CLINIC | Age: 65
End: 2020-12-03

## 2020-12-03 DIAGNOSIS — N30.00 ACUTE CYSTITIS WITHOUT HEMATURIA: Primary | ICD-10-CM

## 2020-12-03 RX ORDER — NITROFURANTOIN 25; 75 MG/1; MG/1
100 CAPSULE ORAL 2 TIMES DAILY
Qty: 10 CAPSULE | Refills: 0 | Status: SHIPPED | OUTPATIENT
Start: 2020-12-03 | End: 2020-12-08

## 2021-02-18 ENCOUNTER — TELEPHONE (OUTPATIENT)
Dept: FAMILY MEDICINE | Facility: CLINIC | Age: 66
End: 2021-02-18

## 2021-03-01 ENCOUNTER — OFFICE VISIT (OUTPATIENT)
Dept: FAMILY MEDICINE | Facility: CLINIC | Age: 66
End: 2021-03-01
Payer: OTHER GOVERNMENT

## 2021-03-01 ENCOUNTER — TELEPHONE (OUTPATIENT)
Dept: ORTHOPEDICS | Facility: CLINIC | Age: 66
End: 2021-03-01

## 2021-03-01 VITALS
HEART RATE: 88 BPM | BODY MASS INDEX: 31.23 KG/M2 | HEIGHT: 67 IN | SYSTOLIC BLOOD PRESSURE: 112 MMHG | WEIGHT: 199 LBS | DIASTOLIC BLOOD PRESSURE: 82 MMHG

## 2021-03-01 DIAGNOSIS — F41.0 PANIC DISORDER WITHOUT AGORAPHOBIA: ICD-10-CM

## 2021-03-01 DIAGNOSIS — R39.89 SENSATION OF PRESSURE IN BLADDER AREA: ICD-10-CM

## 2021-03-01 DIAGNOSIS — F17.200 SMOKER: ICD-10-CM

## 2021-03-01 DIAGNOSIS — Z12.31 SCREENING MAMMOGRAM, ENCOUNTER FOR: ICD-10-CM

## 2021-03-01 DIAGNOSIS — R10.31 RIGHT GROIN PAIN: ICD-10-CM

## 2021-03-01 DIAGNOSIS — F33.9 CHRONIC RECURRENT MAJOR DEPRESSIVE DISORDER: ICD-10-CM

## 2021-03-01 DIAGNOSIS — F90.0 ATTENTION DEFICIT HYPERACTIVITY DISORDER (ADHD), PREDOMINANTLY INATTENTIVE TYPE: Primary | ICD-10-CM

## 2021-03-01 PROCEDURE — 99214 OFFICE O/P EST MOD 30 MIN: CPT | Mod: S$GLB,,, | Performed by: FAMILY MEDICINE

## 2021-03-01 PROCEDURE — 99214 PR OFFICE/OUTPT VISIT, EST, LEVL IV, 30-39 MIN: ICD-10-PCS | Mod: S$GLB,,, | Performed by: FAMILY MEDICINE

## 2021-03-01 RX ORDER — PHENAZOPYRIDINE HYDROCHLORIDE 200 MG/1
200 TABLET, FILM COATED ORAL 3 TIMES DAILY PRN
Qty: 30 TABLET | Refills: 0 | Status: SHIPPED | OUTPATIENT
Start: 2021-03-01 | End: 2023-02-06

## 2021-03-01 RX ORDER — FLUOXETINE HYDROCHLORIDE 40 MG/1
40 CAPSULE ORAL DAILY
Qty: 90 CAPSULE | Refills: 1 | Status: SHIPPED | OUTPATIENT
Start: 2021-03-01 | End: 2021-09-15 | Stop reason: SDUPTHER

## 2021-03-01 RX ORDER — DEXTROAMPHETAMINE SACCHARATE, AMPHETAMINE ASPARTATE, DEXTROAMPHETAMINE SULFATE AND AMPHETAMINE SULFATE 5; 5; 5; 5 MG/1; MG/1; MG/1; MG/1
1 TABLET ORAL DAILY
Qty: 90 TABLET | Refills: 0 | Status: SHIPPED | OUTPATIENT
Start: 2021-03-01 | End: 2021-06-01 | Stop reason: SDUPTHER

## 2021-03-01 RX ORDER — PHENAZOPYRIDINE HYDROCHLORIDE 200 MG/1
200 TABLET, FILM COATED ORAL 3 TIMES DAILY PRN
COMMUNITY
End: 2021-03-01 | Stop reason: SDUPTHER

## 2021-03-01 RX ORDER — ALPRAZOLAM 1 MG/1
1 TABLET ORAL NIGHTLY PRN
Qty: 90 TABLET | Refills: 1 | Status: SHIPPED | OUTPATIENT
Start: 2021-03-01 | End: 2021-09-29 | Stop reason: SDUPTHER

## 2021-03-12 DIAGNOSIS — R10.31 RIGHT GROIN PAIN: Primary | ICD-10-CM

## 2021-03-15 ENCOUNTER — HOSPITAL ENCOUNTER (OUTPATIENT)
Dept: RADIOLOGY | Facility: HOSPITAL | Age: 66
Discharge: HOME | End: 2021-03-15
Attending: ORTHOPAEDIC SURGERY
Payer: OTHER GOVERNMENT

## 2021-03-15 ENCOUNTER — OFFICE VISIT (OUTPATIENT)
Dept: ORTHOPEDICS | Facility: CLINIC | Age: 66
End: 2021-03-15
Payer: OTHER GOVERNMENT

## 2021-03-15 VITALS — HEIGHT: 67 IN | WEIGHT: 199 LBS | RESPIRATION RATE: 16 BRPM | BODY MASS INDEX: 31.23 KG/M2

## 2021-03-15 DIAGNOSIS — M16.11 ARTHRITIS OF RIGHT HIP: Primary | ICD-10-CM

## 2021-03-15 DIAGNOSIS — R10.31 RIGHT GROIN PAIN: ICD-10-CM

## 2021-03-15 PROCEDURE — 73502 XR HIP 2 VIEW RIGHT: ICD-10-PCS | Mod: 26,RT,, | Performed by: RADIOLOGY

## 2021-03-15 PROCEDURE — 99214 OFFICE O/P EST MOD 30 MIN: CPT | Mod: PBBFAC,25,PN | Performed by: ORTHOPAEDIC SURGERY

## 2021-03-15 PROCEDURE — 73502 X-RAY EXAM HIP UNI 2-3 VIEWS: CPT | Mod: 26,RT,, | Performed by: RADIOLOGY

## 2021-03-15 PROCEDURE — 99243 OFF/OP CNSLTJ NEW/EST LOW 30: CPT | Mod: S$PBB,,, | Performed by: ORTHOPAEDIC SURGERY

## 2021-03-15 PROCEDURE — 99999 PR PBB SHADOW E&M-EST. PATIENT-LVL IV: ICD-10-PCS | Mod: PBBFAC,,, | Performed by: ORTHOPAEDIC SURGERY

## 2021-03-15 PROCEDURE — 73502 X-RAY EXAM HIP UNI 2-3 VIEWS: CPT | Mod: TC,PN,RT

## 2021-03-15 PROCEDURE — 99999 PR PBB SHADOW E&M-EST. PATIENT-LVL IV: CPT | Mod: PBBFAC,,, | Performed by: ORTHOPAEDIC SURGERY

## 2021-03-15 PROCEDURE — 99243 PR OFFICE CONSULTATION,LEVEL III: ICD-10-PCS | Mod: S$PBB,,, | Performed by: ORTHOPAEDIC SURGERY

## 2021-03-24 ENCOUNTER — HOSPITAL ENCOUNTER (OUTPATIENT)
Dept: RADIOLOGY | Facility: HOSPITAL | Age: 66
Discharge: HOME | End: 2021-03-24
Attending: FAMILY MEDICINE
Payer: OTHER GOVERNMENT

## 2021-03-24 DIAGNOSIS — R39.89 SENSATION OF PRESSURE IN BLADDER AREA: ICD-10-CM

## 2021-03-24 DIAGNOSIS — Z12.31 SCREENING MAMMOGRAM, ENCOUNTER FOR: ICD-10-CM

## 2021-03-24 PROCEDURE — 76857 US EXAM PELVIC LIMITED: CPT | Mod: TC,PO

## 2021-03-24 PROCEDURE — 77067 SCR MAMMO BI INCL CAD: CPT | Mod: TC,PO

## 2021-03-25 ENCOUNTER — TELEPHONE (OUTPATIENT)
Dept: ORTHOPEDICS | Facility: CLINIC | Age: 66
End: 2021-03-25

## 2021-03-25 ENCOUNTER — IMMUNIZATION (OUTPATIENT)
Dept: FAMILY MEDICINE | Facility: CLINIC | Age: 66
End: 2021-03-25
Payer: OTHER GOVERNMENT

## 2021-03-25 ENCOUNTER — TELEPHONE (OUTPATIENT)
Dept: FAMILY MEDICINE | Facility: CLINIC | Age: 66
End: 2021-03-25

## 2021-03-25 DIAGNOSIS — M16.11 ARTHRITIS OF RIGHT HIP: Primary | ICD-10-CM

## 2021-03-25 DIAGNOSIS — Z23 NEED FOR VACCINATION: Primary | ICD-10-CM

## 2021-03-25 PROCEDURE — 0001A COVID-19, MRNA, LNP-S, PF, 30 MCG/0.3 ML DOSE VACCINE: ICD-10-PCS | Mod: CV19,S$GLB,, | Performed by: INTERNAL MEDICINE

## 2021-03-25 PROCEDURE — 0001A COVID-19, MRNA, LNP-S, PF, 30 MCG/0.3 ML DOSE VACCINE: CPT | Mod: CV19,S$GLB,, | Performed by: INTERNAL MEDICINE

## 2021-03-25 PROCEDURE — 91300 COVID-19, MRNA, LNP-S, PF, 30 MCG/0.3 ML DOSE VACCINE: ICD-10-PCS | Mod: S$GLB,,, | Performed by: INTERNAL MEDICINE

## 2021-03-25 PROCEDURE — 91300 COVID-19, MRNA, LNP-S, PF, 30 MCG/0.3 ML DOSE VACCINE: CPT | Mod: S$GLB,,, | Performed by: INTERNAL MEDICINE

## 2021-03-31 ENCOUNTER — HOSPITAL ENCOUNTER (OUTPATIENT)
Dept: RADIOLOGY | Facility: HOSPITAL | Age: 66
Discharge: HOME | End: 2021-03-31
Attending: ORTHOPAEDIC SURGERY
Payer: OTHER GOVERNMENT

## 2021-03-31 DIAGNOSIS — M16.11 ARTHRITIS OF RIGHT HIP: ICD-10-CM

## 2021-03-31 PROCEDURE — 20610 DRAIN/INJ JOINT/BURSA W/O US: CPT | Mod: RT,,, | Performed by: RADIOLOGY

## 2021-03-31 PROCEDURE — 25500020 PHARM REV CODE 255: Performed by: ORTHOPAEDIC SURGERY

## 2021-03-31 PROCEDURE — 77002 FL ASPIRATION INJECTION MAJOR JOINT RIGHT WITH FLUORO: ICD-10-PCS | Mod: 26,,, | Performed by: RADIOLOGY

## 2021-03-31 PROCEDURE — 20610 FL ASPIRATION INJECTION MAJOR JOINT RIGHT WITH FLUORO: ICD-10-PCS | Mod: RT,,, | Performed by: RADIOLOGY

## 2021-03-31 PROCEDURE — 25000003 PHARM REV CODE 250: Performed by: RADIOLOGY

## 2021-03-31 PROCEDURE — 20610 DRAIN/INJ JOINT/BURSA W/O US: CPT | Mod: TC,RT

## 2021-03-31 PROCEDURE — 77002 NEEDLE LOCALIZATION BY XRAY: CPT | Mod: 26,,, | Performed by: RADIOLOGY

## 2021-03-31 PROCEDURE — 63600175 PHARM REV CODE 636 W HCPCS: Performed by: RADIOLOGY

## 2021-03-31 RX ORDER — BUPIVACAINE HYDROCHLORIDE 2.5 MG/ML
5 INJECTION, SOLUTION EPIDURAL; INFILTRATION; INTRACAUDAL ONCE
Status: COMPLETED | OUTPATIENT
Start: 2021-03-31 | End: 2021-03-31

## 2021-03-31 RX ORDER — METHYLPREDNISOLONE ACETATE 80 MG/ML
80 INJECTION, SUSPENSION INTRA-ARTICULAR; INTRALESIONAL; INTRAMUSCULAR; SOFT TISSUE ONCE
Status: COMPLETED | OUTPATIENT
Start: 2021-03-31 | End: 2021-03-31

## 2021-03-31 RX ADMIN — IOHEXOL 1 ML: 350 INJECTION, SOLUTION INTRAVENOUS at 08:03

## 2021-03-31 RX ADMIN — METHYLPREDNISOLONE ACETATE 80 MG: 80 INJECTION, SUSPENSION INTRA-ARTICULAR; INTRALESIONAL; INTRAMUSCULAR; SOFT TISSUE at 08:03

## 2021-03-31 RX ADMIN — BUPIVACAINE HYDROCHLORIDE 3 ML: 2.5 INJECTION, SOLUTION EPIDURAL; INFILTRATION; INTRACAUDAL; PERINEURAL at 08:03

## 2021-04-15 ENCOUNTER — IMMUNIZATION (OUTPATIENT)
Dept: FAMILY MEDICINE | Facility: CLINIC | Age: 66
End: 2021-04-15
Payer: OTHER GOVERNMENT

## 2021-04-15 DIAGNOSIS — Z23 NEED FOR VACCINATION: Primary | ICD-10-CM

## 2021-04-15 PROCEDURE — 91300 COVID-19, MRNA, LNP-S, PF, 30 MCG/0.3 ML DOSE VACCINE: CPT | Mod: S$GLB,,, | Performed by: FAMILY MEDICINE

## 2021-04-15 PROCEDURE — 0002A COVID-19, MRNA, LNP-S, PF, 30 MCG/0.3 ML DOSE VACCINE: CPT | Mod: CV19,S$GLB,, | Performed by: FAMILY MEDICINE

## 2021-04-15 PROCEDURE — 91300 COVID-19, MRNA, LNP-S, PF, 30 MCG/0.3 ML DOSE VACCINE: ICD-10-PCS | Mod: S$GLB,,, | Performed by: FAMILY MEDICINE

## 2021-04-15 PROCEDURE — 0002A COVID-19, MRNA, LNP-S, PF, 30 MCG/0.3 ML DOSE VACCINE: ICD-10-PCS | Mod: CV19,S$GLB,, | Performed by: FAMILY MEDICINE

## 2021-06-01 ENCOUNTER — OFFICE VISIT (OUTPATIENT)
Dept: FAMILY MEDICINE | Facility: CLINIC | Age: 66
End: 2021-06-01
Payer: MEDICARE

## 2021-06-01 VITALS
OXYGEN SATURATION: 96 % | SYSTOLIC BLOOD PRESSURE: 124 MMHG | WEIGHT: 200 LBS | DIASTOLIC BLOOD PRESSURE: 76 MMHG | HEART RATE: 78 BPM | HEIGHT: 67 IN | BODY MASS INDEX: 31.39 KG/M2

## 2021-06-01 DIAGNOSIS — F90.0 ATTENTION DEFICIT HYPERACTIVITY DISORDER (ADHD), PREDOMINANTLY INATTENTIVE TYPE: ICD-10-CM

## 2021-06-01 DIAGNOSIS — F17.200 SMOKER: ICD-10-CM

## 2021-06-01 DIAGNOSIS — F51.04 CHRONIC INSOMNIA: ICD-10-CM

## 2021-06-01 DIAGNOSIS — M25.551 ARTHRALGIA OF RIGHT HIP: Primary | ICD-10-CM

## 2021-06-01 DIAGNOSIS — Z76.0 MEDICATION REFILL: ICD-10-CM

## 2021-06-01 PROCEDURE — 99214 OFFICE O/P EST MOD 30 MIN: CPT | Mod: S$GLB,,, | Performed by: FAMILY MEDICINE

## 2021-06-01 PROCEDURE — 99214 PR OFFICE/OUTPT VISIT, EST, LEVL IV, 30-39 MIN: ICD-10-PCS | Mod: S$GLB,,, | Performed by: FAMILY MEDICINE

## 2021-06-01 RX ORDER — HYDROCODONE BITARTRATE AND ACETAMINOPHEN 5; 325 MG/1; MG/1
1 TABLET ORAL EVERY 12 HOURS PRN
Qty: 60 TABLET | Refills: 0 | Status: SHIPPED | OUTPATIENT
Start: 2021-06-01 | End: 2021-09-15 | Stop reason: SDUPTHER

## 2021-06-01 RX ORDER — CYCLOBENZAPRINE HCL 10 MG
10 TABLET ORAL 3 TIMES DAILY PRN
Qty: 270 TABLET | Refills: 1 | Status: SHIPPED | OUTPATIENT
Start: 2021-06-01 | End: 2022-06-03 | Stop reason: SDUPTHER

## 2021-06-01 RX ORDER — ZOLPIDEM TARTRATE 10 MG/1
20 TABLET ORAL NIGHTLY
Qty: 180 TABLET | Refills: 1 | Status: SHIPPED | OUTPATIENT
Start: 2021-06-01 | End: 2021-09-15 | Stop reason: SDUPTHER

## 2021-06-01 RX ORDER — ATORVASTATIN CALCIUM 20 MG/1
20 TABLET, FILM COATED ORAL DAILY
Qty: 90 TABLET | Refills: 1 | Status: SHIPPED | OUTPATIENT
Start: 2021-06-01 | End: 2021-09-15 | Stop reason: SDUPTHER

## 2021-06-01 RX ORDER — DEXTROAMPHETAMINE SACCHARATE, AMPHETAMINE ASPARTATE, DEXTROAMPHETAMINE SULFATE AND AMPHETAMINE SULFATE 5; 5; 5; 5 MG/1; MG/1; MG/1; MG/1
1 TABLET ORAL DAILY
Qty: 90 TABLET | Refills: 0 | Status: SHIPPED | OUTPATIENT
Start: 2021-06-01 | End: 2021-09-15 | Stop reason: SDUPTHER

## 2021-06-01 RX ORDER — ESTRADIOL 0.1 MG/G
4 CREAM VAGINAL
Qty: 3 TUBE | Refills: 1 | Status: SHIPPED | OUTPATIENT
Start: 2021-06-03 | End: 2022-01-03 | Stop reason: SDUPTHER

## 2021-06-01 RX ORDER — DOXEPIN HYDROCHLORIDE 100 MG/1
100 CAPSULE ORAL NIGHTLY
Qty: 90 CAPSULE | Refills: 1 | Status: SHIPPED | OUTPATIENT
Start: 2021-06-01 | End: 2021-09-15 | Stop reason: SDUPTHER

## 2021-07-01 RX ORDER — DULOXETIN HYDROCHLORIDE 30 MG/1
30 CAPSULE, DELAYED RELEASE ORAL DAILY
Qty: 90 CAPSULE | Refills: 1 | Status: SHIPPED | OUTPATIENT
Start: 2021-07-01 | End: 2021-09-15

## 2021-07-12 ENCOUNTER — TELEPHONE (OUTPATIENT)
Dept: FAMILY MEDICINE | Facility: CLINIC | Age: 66
End: 2021-07-12

## 2021-07-16 ENCOUNTER — TELEPHONE (OUTPATIENT)
Dept: FAMILY MEDICINE | Facility: CLINIC | Age: 66
End: 2021-07-16

## 2021-08-17 ENCOUNTER — TELEPHONE (OUTPATIENT)
Dept: FAMILY MEDICINE | Facility: CLINIC | Age: 66
End: 2021-08-17

## 2021-08-17 DIAGNOSIS — M25.551 ARTHRALGIA OF RIGHT HIP: Primary | ICD-10-CM

## 2021-08-18 ENCOUNTER — TELEPHONE (OUTPATIENT)
Dept: FAMILY MEDICINE | Facility: CLINIC | Age: 66
End: 2021-08-18

## 2021-09-10 ENCOUNTER — LAB VISIT (OUTPATIENT)
Dept: LAB | Facility: HOSPITAL | Age: 66
End: 2021-09-10
Attending: FAMILY MEDICINE
Payer: MEDICARE

## 2021-09-10 DIAGNOSIS — F41.0 PANIC DISORDER WITHOUT AGORAPHOBIA: ICD-10-CM

## 2021-09-10 DIAGNOSIS — Z13.6 SCREENING FOR ISCHEMIC HEART DISEASE: ICD-10-CM

## 2021-09-10 DIAGNOSIS — Z79.899 ENCOUNTER FOR LONG-TERM (CURRENT) USE OF OTHER MEDICATIONS: Primary | ICD-10-CM

## 2021-09-10 DIAGNOSIS — M54.12 BRACHIAL NEURITIS: ICD-10-CM

## 2021-09-10 LAB
ALBUMIN SERPL BCP-MCNC: 4.3 G/DL (ref 3.5–5.2)
ALP SERPL-CCNC: 139 U/L (ref 55–135)
ALT SERPL W/O P-5'-P-CCNC: 53 U/L (ref 10–44)
AMPHET+METHAMPHET UR QL: ABNORMAL
ANION GAP SERPL CALC-SCNC: 10 MMOL/L (ref 8–16)
AST SERPL-CCNC: 51 U/L (ref 10–40)
BACTERIA #/AREA URNS HPF: ABNORMAL /HPF
BARBITURATES UR QL SCN>200 NG/ML: NEGATIVE
BASOPHILS # BLD AUTO: 0.06 K/UL (ref 0–0.2)
BASOPHILS NFR BLD: 0.6 % (ref 0–1.9)
BENZODIAZ UR QL SCN>200 NG/ML: ABNORMAL
BILIRUB SERPL-MCNC: 1 MG/DL (ref 0.1–1)
BILIRUB UR QL STRIP: ABNORMAL
BUN SERPL-MCNC: 16 MG/DL (ref 8–23)
BZE UR QL SCN: NEGATIVE
CALCIUM SERPL-MCNC: 9.5 MG/DL (ref 8.7–10.5)
CANNABINOIDS UR QL SCN: NEGATIVE
CHLORIDE SERPL-SCNC: 111 MMOL/L (ref 95–110)
CHOLEST SERPL-MCNC: 177 MG/DL (ref 120–199)
CHOLEST/HDLC SERPL: 6.6 {RATIO} (ref 2–5)
CLARITY UR: ABNORMAL
CO2 SERPL-SCNC: 22 MMOL/L (ref 23–29)
COLOR UR: YELLOW
CREAT SERPL-MCNC: 1.1 MG/DL (ref 0.5–1.4)
CREAT UR-MCNC: 239 MG/DL (ref 15–325)
DIFFERENTIAL METHOD: ABNORMAL
EOSINOPHIL # BLD AUTO: 0.2 K/UL (ref 0–0.5)
EOSINOPHIL NFR BLD: 2.1 % (ref 0–8)
ERYTHROCYTE [DISTWIDTH] IN BLOOD BY AUTOMATED COUNT: 14.8 % (ref 11.5–14.5)
EST. GFR  (AFRICAN AMERICAN): >60 ML/MIN/1.73 M^2
EST. GFR  (NON AFRICAN AMERICAN): 52.8 ML/MIN/1.73 M^2
GLUCOSE SERPL-MCNC: 102 MG/DL (ref 70–110)
GLUCOSE UR QL STRIP: NEGATIVE
HCT VFR BLD AUTO: 45 % (ref 37–48.5)
HDLC SERPL-MCNC: 27 MG/DL (ref 40–75)
HDLC SERPL: 15.3 % (ref 20–50)
HGB BLD-MCNC: 14.5 G/DL (ref 12–16)
HGB UR QL STRIP: ABNORMAL
HYALINE CASTS #/AREA URNS LPF: 5 /LPF
IMM GRANULOCYTES # BLD AUTO: 0.04 K/UL (ref 0–0.04)
IMM GRANULOCYTES NFR BLD AUTO: 0.4 % (ref 0–0.5)
KETONES UR QL STRIP: NEGATIVE
LDLC SERPL CALC-MCNC: 108 MG/DL (ref 63–159)
LEUKOCYTE ESTERASE UR QL STRIP: ABNORMAL
LYMPHOCYTES # BLD AUTO: 2.8 K/UL (ref 1–4.8)
LYMPHOCYTES NFR BLD: 29.4 % (ref 18–48)
MCH RBC QN AUTO: 28.7 PG (ref 27–31)
MCHC RBC AUTO-ENTMCNC: 32.2 G/DL (ref 32–36)
MCV RBC AUTO: 89 FL (ref 82–98)
MICROSCOPIC COMMENT: ABNORMAL
MONOCYTES # BLD AUTO: 1 K/UL (ref 0.3–1)
MONOCYTES NFR BLD: 10.1 % (ref 4–15)
NEUTROPHILS # BLD AUTO: 5.4 K/UL (ref 1.8–7.7)
NEUTROPHILS NFR BLD: 57.4 % (ref 38–73)
NITRITE UR QL STRIP: POSITIVE
NONHDLC SERPL-MCNC: 150 MG/DL
NRBC BLD-RTO: 0 /100 WBC
OPIATES UR QL SCN: ABNORMAL
PCP UR QL SCN>25 NG/ML: NEGATIVE
PH UR STRIP: 6 [PH] (ref 5–8)
PLATELET # BLD AUTO: 306 K/UL (ref 150–450)
PMV BLD AUTO: 9.8 FL (ref 9.2–12.9)
POTASSIUM SERPL-SCNC: 4.3 MMOL/L (ref 3.5–5.1)
PROT SERPL-MCNC: 7.6 G/DL (ref 6–8.4)
PROT UR QL STRIP: ABNORMAL
RBC # BLD AUTO: 5.06 M/UL (ref 4–5.4)
RBC #/AREA URNS HPF: 3 /HPF (ref 0–4)
SODIUM SERPL-SCNC: 143 MMOL/L (ref 136–145)
SP GR UR STRIP: 1.02 (ref 1–1.03)
SQUAMOUS #/AREA URNS HPF: 1 /HPF
TOXICOLOGY INFORMATION: ABNORMAL
TRIGL SERPL-MCNC: 210 MG/DL (ref 30–150)
URN SPEC COLLECT METH UR: ABNORMAL
UROBILINOGEN UR STRIP-ACNC: NEGATIVE EU/DL
WBC # BLD AUTO: 9.41 K/UL (ref 3.9–12.7)
WBC #/AREA URNS HPF: >100 /HPF (ref 0–5)

## 2021-09-10 PROCEDURE — 36415 COLL VENOUS BLD VENIPUNCTURE: CPT | Performed by: FAMILY MEDICINE

## 2021-09-10 PROCEDURE — 80307 DRUG TEST PRSMV CHEM ANLYZR: CPT | Performed by: FAMILY MEDICINE

## 2021-09-10 PROCEDURE — 81001 URINALYSIS AUTO W/SCOPE: CPT | Mod: 59 | Performed by: FAMILY MEDICINE

## 2021-09-10 PROCEDURE — 85025 COMPLETE CBC W/AUTO DIFF WBC: CPT | Performed by: FAMILY MEDICINE

## 2021-09-10 PROCEDURE — 80061 LIPID PANEL: CPT | Performed by: FAMILY MEDICINE

## 2021-09-10 PROCEDURE — 80053 COMPREHEN METABOLIC PANEL: CPT | Performed by: FAMILY MEDICINE

## 2021-09-13 ENCOUNTER — OFFICE VISIT (OUTPATIENT)
Dept: ORTHOPEDICS | Facility: CLINIC | Age: 66
End: 2021-09-13
Payer: MEDICARE

## 2021-09-13 VITALS — BODY MASS INDEX: 31.39 KG/M2 | WEIGHT: 200 LBS | HEIGHT: 67 IN | RESPIRATION RATE: 16 BRPM

## 2021-09-13 DIAGNOSIS — R10.31 RIGHT GROIN PAIN: Primary | ICD-10-CM

## 2021-09-13 DIAGNOSIS — M25.551 ARTHRALGIA OF RIGHT HIP: ICD-10-CM

## 2021-09-13 DIAGNOSIS — M25.551 ARTHRALGIA OF RIGHT HIP: Primary | ICD-10-CM

## 2021-09-13 PROCEDURE — 99999 PR PBB SHADOW E&M-EST. PATIENT-LVL IV: ICD-10-PCS | Mod: PBBFAC,,, | Performed by: ORTHOPAEDIC SURGERY

## 2021-09-13 PROCEDURE — 3288F PR FALLS RISK ASSESSMENT DOCUMENTED: ICD-10-PCS | Mod: CPTII,S$GLB,, | Performed by: ORTHOPAEDIC SURGERY

## 2021-09-13 PROCEDURE — 1101F PR PT FALLS ASSESS DOC 0-1 FALLS W/OUT INJ PAST YR: ICD-10-PCS | Mod: CPTII,S$GLB,, | Performed by: ORTHOPAEDIC SURGERY

## 2021-09-13 PROCEDURE — 1159F MED LIST DOCD IN RCRD: CPT | Mod: CPTII,S$GLB,, | Performed by: ORTHOPAEDIC SURGERY

## 2021-09-13 PROCEDURE — 99999 PR PBB SHADOW E&M-EST. PATIENT-LVL IV: CPT | Mod: PBBFAC,,, | Performed by: ORTHOPAEDIC SURGERY

## 2021-09-13 PROCEDURE — 99213 OFFICE O/P EST LOW 20 MIN: CPT | Mod: S$GLB,,, | Performed by: ORTHOPAEDIC SURGERY

## 2021-09-13 PROCEDURE — 3288F FALL RISK ASSESSMENT DOCD: CPT | Mod: CPTII,S$GLB,, | Performed by: ORTHOPAEDIC SURGERY

## 2021-09-13 PROCEDURE — 3008F PR BODY MASS INDEX (BMI) DOCUMENTED: ICD-10-PCS | Mod: CPTII,S$GLB,, | Performed by: ORTHOPAEDIC SURGERY

## 2021-09-13 PROCEDURE — 1160F RVW MEDS BY RX/DR IN RCRD: CPT | Mod: CPTII,S$GLB,, | Performed by: ORTHOPAEDIC SURGERY

## 2021-09-13 PROCEDURE — 1159F PR MEDICATION LIST DOCUMENTED IN MEDICAL RECORD: ICD-10-PCS | Mod: CPTII,S$GLB,, | Performed by: ORTHOPAEDIC SURGERY

## 2021-09-13 PROCEDURE — 99213 PR OFFICE/OUTPT VISIT, EST, LEVL III, 20-29 MIN: ICD-10-PCS | Mod: S$GLB,,, | Performed by: ORTHOPAEDIC SURGERY

## 2021-09-13 PROCEDURE — 1160F PR REVIEW ALL MEDS BY PRESCRIBER/CLIN PHARMACIST DOCUMENTED: ICD-10-PCS | Mod: CPTII,S$GLB,, | Performed by: ORTHOPAEDIC SURGERY

## 2021-09-13 PROCEDURE — 3008F BODY MASS INDEX DOCD: CPT | Mod: CPTII,S$GLB,, | Performed by: ORTHOPAEDIC SURGERY

## 2021-09-13 PROCEDURE — 1101F PT FALLS ASSESS-DOCD LE1/YR: CPT | Mod: CPTII,S$GLB,, | Performed by: ORTHOPAEDIC SURGERY

## 2021-09-15 ENCOUNTER — OFFICE VISIT (OUTPATIENT)
Dept: FAMILY MEDICINE | Facility: CLINIC | Age: 66
End: 2021-09-15
Payer: MEDICARE

## 2021-09-15 VITALS
SYSTOLIC BLOOD PRESSURE: 116 MMHG | WEIGHT: 199 LBS | HEIGHT: 67 IN | BODY MASS INDEX: 31.23 KG/M2 | DIASTOLIC BLOOD PRESSURE: 78 MMHG | HEART RATE: 73 BPM

## 2021-09-15 DIAGNOSIS — Z76.0 MEDICATION REFILL: ICD-10-CM

## 2021-09-15 DIAGNOSIS — F90.0 ATTENTION DEFICIT HYPERACTIVITY DISORDER (ADHD), PREDOMINANTLY INATTENTIVE TYPE: Primary | ICD-10-CM

## 2021-09-15 DIAGNOSIS — F17.200 SMOKER: ICD-10-CM

## 2021-09-15 DIAGNOSIS — F33.9 CHRONIC RECURRENT MAJOR DEPRESSIVE DISORDER: ICD-10-CM

## 2021-09-15 DIAGNOSIS — N30.00 ACUTE CYSTITIS WITHOUT HEMATURIA: ICD-10-CM

## 2021-09-15 DIAGNOSIS — F51.04 CHRONIC INSOMNIA: ICD-10-CM

## 2021-09-15 DIAGNOSIS — M25.551 ARTHRALGIA OF RIGHT HIP: ICD-10-CM

## 2021-09-15 PROCEDURE — 3078F PR MOST RECENT DIASTOLIC BLOOD PRESSURE < 80 MM HG: ICD-10-PCS | Mod: S$GLB,,, | Performed by: FAMILY MEDICINE

## 2021-09-15 PROCEDURE — 1160F RVW MEDS BY RX/DR IN RCRD: CPT | Mod: S$GLB,,, | Performed by: FAMILY MEDICINE

## 2021-09-15 PROCEDURE — 99214 PR OFFICE/OUTPT VISIT, EST, LEVL IV, 30-39 MIN: ICD-10-PCS | Mod: S$GLB,,, | Performed by: FAMILY MEDICINE

## 2021-09-15 PROCEDURE — 3074F SYST BP LT 130 MM HG: CPT | Mod: S$GLB,,, | Performed by: FAMILY MEDICINE

## 2021-09-15 PROCEDURE — 99214 OFFICE O/P EST MOD 30 MIN: CPT | Mod: S$GLB,,, | Performed by: FAMILY MEDICINE

## 2021-09-15 PROCEDURE — 3008F BODY MASS INDEX DOCD: CPT | Mod: S$GLB,,, | Performed by: FAMILY MEDICINE

## 2021-09-15 PROCEDURE — 1160F PR REVIEW ALL MEDS BY PRESCRIBER/CLIN PHARMACIST DOCUMENTED: ICD-10-PCS | Mod: S$GLB,,, | Performed by: FAMILY MEDICINE

## 2021-09-15 PROCEDURE — 3008F PR BODY MASS INDEX (BMI) DOCUMENTED: ICD-10-PCS | Mod: S$GLB,,, | Performed by: FAMILY MEDICINE

## 2021-09-15 PROCEDURE — 3078F DIAST BP <80 MM HG: CPT | Mod: S$GLB,,, | Performed by: FAMILY MEDICINE

## 2021-09-15 PROCEDURE — 3074F PR MOST RECENT SYSTOLIC BLOOD PRESSURE < 130 MM HG: ICD-10-PCS | Mod: S$GLB,,, | Performed by: FAMILY MEDICINE

## 2021-09-15 RX ORDER — CEFUROXIME AXETIL 500 MG/1
500 TABLET ORAL 2 TIMES DAILY
Qty: 10 TABLET | Refills: 0 | Status: SHIPPED | OUTPATIENT
Start: 2021-09-15 | End: 2022-01-03 | Stop reason: ALTCHOICE

## 2021-09-15 RX ORDER — FLUOXETINE HYDROCHLORIDE 40 MG/1
40 CAPSULE ORAL DAILY
Qty: 90 CAPSULE | Refills: 1 | Status: SHIPPED | OUTPATIENT
Start: 2021-09-15 | End: 2022-04-18 | Stop reason: SDUPTHER

## 2021-09-15 RX ORDER — ATORVASTATIN CALCIUM 20 MG/1
20 TABLET, FILM COATED ORAL DAILY
Qty: 90 TABLET | Refills: 1 | Status: SHIPPED | OUTPATIENT
Start: 2021-09-15 | End: 2022-10-03 | Stop reason: SDUPTHER

## 2021-09-15 RX ORDER — DEXTROAMPHETAMINE SACCHARATE, AMPHETAMINE ASPARTATE, DEXTROAMPHETAMINE SULFATE AND AMPHETAMINE SULFATE 5; 5; 5; 5 MG/1; MG/1; MG/1; MG/1
1 TABLET ORAL DAILY
Qty: 90 TABLET | Refills: 0 | Status: SHIPPED | OUTPATIENT
Start: 2021-09-15 | End: 2022-01-03 | Stop reason: SDUPTHER

## 2021-09-15 RX ORDER — ZOLPIDEM TARTRATE 10 MG/1
20 TABLET ORAL NIGHTLY
Qty: 180 TABLET | Refills: 1 | Status: SHIPPED | OUTPATIENT
Start: 2021-09-15 | End: 2022-04-18 | Stop reason: SDUPTHER

## 2021-09-15 RX ORDER — HYDROCODONE BITARTRATE AND ACETAMINOPHEN 5; 325 MG/1; MG/1
1 TABLET ORAL EVERY 12 HOURS PRN
Qty: 60 TABLET | Refills: 0 | Status: SHIPPED | OUTPATIENT
Start: 2021-09-15 | End: 2022-01-03 | Stop reason: SDUPTHER

## 2021-09-15 RX ORDER — DOXEPIN HYDROCHLORIDE 100 MG/1
100 CAPSULE ORAL NIGHTLY
Qty: 90 CAPSULE | Refills: 1 | Status: SHIPPED | OUTPATIENT
Start: 2021-09-15 | End: 2022-04-18 | Stop reason: SDUPTHER

## 2021-09-18 ENCOUNTER — HOSPITAL ENCOUNTER (OUTPATIENT)
Dept: RADIOLOGY | Facility: HOSPITAL | Age: 66
Discharge: HOME | End: 2021-09-18
Attending: ORTHOPAEDIC SURGERY
Payer: MEDICARE

## 2021-09-18 DIAGNOSIS — M25.551 ARTHRALGIA OF RIGHT HIP: ICD-10-CM

## 2021-09-18 PROCEDURE — 73721 MRI HIP WITHOUT CONTRAST RIGHT: ICD-10-PCS | Mod: 26,RT,, | Performed by: RADIOLOGY

## 2021-09-18 PROCEDURE — 73721 MRI JNT OF LWR EXTRE W/O DYE: CPT | Mod: 26,RT,, | Performed by: RADIOLOGY

## 2021-09-18 PROCEDURE — 73721 MRI JNT OF LWR EXTRE W/O DYE: CPT | Mod: TC,RT

## 2021-09-29 DIAGNOSIS — F41.0 PANIC DISORDER WITHOUT AGORAPHOBIA: ICD-10-CM

## 2021-09-29 RX ORDER — ALPRAZOLAM 1 MG/1
1 TABLET ORAL NIGHTLY PRN
Qty: 90 TABLET | Refills: 1 | Status: SHIPPED | OUTPATIENT
Start: 2021-09-29 | End: 2022-01-03 | Stop reason: SDUPTHER

## 2021-10-14 ENCOUNTER — OFFICE VISIT (OUTPATIENT)
Dept: ORTHOPEDICS | Facility: CLINIC | Age: 66
End: 2021-10-14
Payer: MEDICARE

## 2021-10-14 VITALS — RESPIRATION RATE: 18 BRPM | BODY MASS INDEX: 31.23 KG/M2 | HEIGHT: 67 IN | WEIGHT: 199 LBS

## 2021-10-14 DIAGNOSIS — M16.11 ARTHRITIS OF RIGHT HIP: Primary | ICD-10-CM

## 2021-10-14 PROCEDURE — 3008F PR BODY MASS INDEX (BMI) DOCUMENTED: ICD-10-PCS | Mod: CPTII,S$GLB,, | Performed by: ORTHOPAEDIC SURGERY

## 2021-10-14 PROCEDURE — 1101F PT FALLS ASSESS-DOCD LE1/YR: CPT | Mod: CPTII,S$GLB,, | Performed by: ORTHOPAEDIC SURGERY

## 2021-10-14 PROCEDURE — 3008F BODY MASS INDEX DOCD: CPT | Mod: CPTII,S$GLB,, | Performed by: ORTHOPAEDIC SURGERY

## 2021-10-14 PROCEDURE — 99999 PR PBB SHADOW E&M-EST. PATIENT-LVL III: CPT | Mod: PBBFAC,,, | Performed by: ORTHOPAEDIC SURGERY

## 2021-10-14 PROCEDURE — 1101F PR PT FALLS ASSESS DOC 0-1 FALLS W/OUT INJ PAST YR: ICD-10-PCS | Mod: CPTII,S$GLB,, | Performed by: ORTHOPAEDIC SURGERY

## 2021-10-14 PROCEDURE — 99999 PR PBB SHADOW E&M-EST. PATIENT-LVL III: ICD-10-PCS | Mod: PBBFAC,,, | Performed by: ORTHOPAEDIC SURGERY

## 2021-10-14 PROCEDURE — 99213 OFFICE O/P EST LOW 20 MIN: CPT | Mod: S$GLB,,, | Performed by: ORTHOPAEDIC SURGERY

## 2021-10-14 PROCEDURE — 1159F MED LIST DOCD IN RCRD: CPT | Mod: CPTII,S$GLB,, | Performed by: ORTHOPAEDIC SURGERY

## 2021-10-14 PROCEDURE — 1159F PR MEDICATION LIST DOCUMENTED IN MEDICAL RECORD: ICD-10-PCS | Mod: CPTII,S$GLB,, | Performed by: ORTHOPAEDIC SURGERY

## 2021-10-14 PROCEDURE — 1160F PR REVIEW ALL MEDS BY PRESCRIBER/CLIN PHARMACIST DOCUMENTED: ICD-10-PCS | Mod: CPTII,S$GLB,, | Performed by: ORTHOPAEDIC SURGERY

## 2021-10-14 PROCEDURE — 99213 PR OFFICE/OUTPT VISIT, EST, LEVL III, 20-29 MIN: ICD-10-PCS | Mod: S$GLB,,, | Performed by: ORTHOPAEDIC SURGERY

## 2021-10-14 PROCEDURE — 3288F FALL RISK ASSESSMENT DOCD: CPT | Mod: CPTII,S$GLB,, | Performed by: ORTHOPAEDIC SURGERY

## 2021-10-14 PROCEDURE — 3288F PR FALLS RISK ASSESSMENT DOCUMENTED: ICD-10-PCS | Mod: CPTII,S$GLB,, | Performed by: ORTHOPAEDIC SURGERY

## 2021-10-14 PROCEDURE — 1160F RVW MEDS BY RX/DR IN RCRD: CPT | Mod: CPTII,S$GLB,, | Performed by: ORTHOPAEDIC SURGERY

## 2021-10-18 ENCOUNTER — OFFICE VISIT (OUTPATIENT)
Dept: ORTHOPEDICS | Facility: CLINIC | Age: 66
End: 2021-10-18
Payer: MEDICARE

## 2021-10-18 VITALS — BODY MASS INDEX: 31.23 KG/M2 | WEIGHT: 199 LBS | RESPIRATION RATE: 16 BRPM | HEIGHT: 67 IN

## 2021-10-18 DIAGNOSIS — M16.11 UNILATERAL PRIMARY OSTEOARTHRITIS, RIGHT HIP: Primary | ICD-10-CM

## 2021-10-18 PROCEDURE — 20610 DRAIN/INJ JOINT/BURSA W/O US: CPT | Mod: RT,S$GLB,, | Performed by: ORTHOPAEDIC SURGERY

## 2021-10-18 PROCEDURE — 1159F MED LIST DOCD IN RCRD: CPT | Mod: CPTII,S$GLB,, | Performed by: ORTHOPAEDIC SURGERY

## 2021-10-18 PROCEDURE — 3008F BODY MASS INDEX DOCD: CPT | Mod: CPTII,S$GLB,, | Performed by: ORTHOPAEDIC SURGERY

## 2021-10-18 PROCEDURE — 1159F PR MEDICATION LIST DOCUMENTED IN MEDICAL RECORD: ICD-10-PCS | Mod: CPTII,S$GLB,, | Performed by: ORTHOPAEDIC SURGERY

## 2021-10-18 PROCEDURE — 99999 PR PBB SHADOW E&M-EST. PATIENT-LVL IV: CPT | Mod: PBBFAC,,, | Performed by: ORTHOPAEDIC SURGERY

## 2021-10-18 PROCEDURE — 1101F PT FALLS ASSESS-DOCD LE1/YR: CPT | Mod: CPTII,S$GLB,, | Performed by: ORTHOPAEDIC SURGERY

## 2021-10-18 PROCEDURE — 1160F RVW MEDS BY RX/DR IN RCRD: CPT | Mod: CPTII,S$GLB,, | Performed by: ORTHOPAEDIC SURGERY

## 2021-10-18 PROCEDURE — 3008F PR BODY MASS INDEX (BMI) DOCUMENTED: ICD-10-PCS | Mod: CPTII,S$GLB,, | Performed by: ORTHOPAEDIC SURGERY

## 2021-10-18 PROCEDURE — 99204 PR OFFICE/OUTPT VISIT, NEW, LEVL IV, 45-59 MIN: ICD-10-PCS | Mod: 25,S$GLB,, | Performed by: ORTHOPAEDIC SURGERY

## 2021-10-18 PROCEDURE — 99999 PR PBB SHADOW E&M-EST. PATIENT-LVL IV: ICD-10-PCS | Mod: PBBFAC,,, | Performed by: ORTHOPAEDIC SURGERY

## 2021-10-18 PROCEDURE — 3288F FALL RISK ASSESSMENT DOCD: CPT | Mod: CPTII,S$GLB,, | Performed by: ORTHOPAEDIC SURGERY

## 2021-10-18 PROCEDURE — 1160F PR REVIEW ALL MEDS BY PRESCRIBER/CLIN PHARMACIST DOCUMENTED: ICD-10-PCS | Mod: CPTII,S$GLB,, | Performed by: ORTHOPAEDIC SURGERY

## 2021-10-18 PROCEDURE — 99204 OFFICE O/P NEW MOD 45 MIN: CPT | Mod: 25,S$GLB,, | Performed by: ORTHOPAEDIC SURGERY

## 2021-10-18 PROCEDURE — 1101F PR PT FALLS ASSESS DOC 0-1 FALLS W/OUT INJ PAST YR: ICD-10-PCS | Mod: CPTII,S$GLB,, | Performed by: ORTHOPAEDIC SURGERY

## 2021-10-18 PROCEDURE — 3288F PR FALLS RISK ASSESSMENT DOCUMENTED: ICD-10-PCS | Mod: CPTII,S$GLB,, | Performed by: ORTHOPAEDIC SURGERY

## 2021-10-18 PROCEDURE — 20610 LARGE JOINT ASPIRATION/INJECTION: R HIP JOINT: ICD-10-PCS | Mod: RT,S$GLB,, | Performed by: ORTHOPAEDIC SURGERY

## 2021-10-18 RX ORDER — METHYLPREDNISOLONE ACETATE 40 MG/ML
40 INJECTION, SUSPENSION INTRA-ARTICULAR; INTRALESIONAL; INTRAMUSCULAR; SOFT TISSUE
Status: DISCONTINUED | OUTPATIENT
Start: 2021-10-18 | End: 2021-10-18 | Stop reason: HOSPADM

## 2021-10-18 RX ADMIN — METHYLPREDNISOLONE ACETATE 40 MG: 40 INJECTION, SUSPENSION INTRA-ARTICULAR; INTRALESIONAL; INTRAMUSCULAR; SOFT TISSUE at 09:10

## 2022-01-03 ENCOUNTER — OFFICE VISIT (OUTPATIENT)
Dept: FAMILY MEDICINE | Facility: CLINIC | Age: 67
End: 2022-01-03
Payer: MEDICARE

## 2022-01-03 VITALS
BODY MASS INDEX: 30.76 KG/M2 | HEIGHT: 67 IN | SYSTOLIC BLOOD PRESSURE: 118 MMHG | HEART RATE: 81 BPM | DIASTOLIC BLOOD PRESSURE: 74 MMHG | WEIGHT: 196 LBS

## 2022-01-03 DIAGNOSIS — Z76.0 MEDICATION REFILL: ICD-10-CM

## 2022-01-03 DIAGNOSIS — M25.551 ARTHRALGIA OF RIGHT HIP: ICD-10-CM

## 2022-01-03 DIAGNOSIS — F17.200 SMOKER: ICD-10-CM

## 2022-01-03 DIAGNOSIS — F51.04 CHRONIC INSOMNIA: ICD-10-CM

## 2022-01-03 DIAGNOSIS — F90.0 ATTENTION DEFICIT HYPERACTIVITY DISORDER (ADHD), PREDOMINANTLY INATTENTIVE TYPE: ICD-10-CM

## 2022-01-03 DIAGNOSIS — F41.0 PANIC DISORDER WITHOUT AGORAPHOBIA: Primary | ICD-10-CM

## 2022-01-03 PROCEDURE — 3288F PR FALLS RISK ASSESSMENT DOCUMENTED: ICD-10-PCS | Mod: S$GLB,,, | Performed by: FAMILY MEDICINE

## 2022-01-03 PROCEDURE — 3008F PR BODY MASS INDEX (BMI) DOCUMENTED: ICD-10-PCS | Mod: S$GLB,,, | Performed by: FAMILY MEDICINE

## 2022-01-03 PROCEDURE — 3074F SYST BP LT 130 MM HG: CPT | Mod: S$GLB,,, | Performed by: FAMILY MEDICINE

## 2022-01-03 PROCEDURE — 1160F PR REVIEW ALL MEDS BY PRESCRIBER/CLIN PHARMACIST DOCUMENTED: ICD-10-PCS | Mod: S$GLB,,, | Performed by: FAMILY MEDICINE

## 2022-01-03 PROCEDURE — 99214 OFFICE O/P EST MOD 30 MIN: CPT | Mod: S$GLB,,, | Performed by: FAMILY MEDICINE

## 2022-01-03 PROCEDURE — 1101F PT FALLS ASSESS-DOCD LE1/YR: CPT | Mod: S$GLB,,, | Performed by: FAMILY MEDICINE

## 2022-01-03 PROCEDURE — 3078F DIAST BP <80 MM HG: CPT | Mod: S$GLB,,, | Performed by: FAMILY MEDICINE

## 2022-01-03 PROCEDURE — 1101F PR PT FALLS ASSESS DOC 0-1 FALLS W/OUT INJ PAST YR: ICD-10-PCS | Mod: S$GLB,,, | Performed by: FAMILY MEDICINE

## 2022-01-03 PROCEDURE — 1160F RVW MEDS BY RX/DR IN RCRD: CPT | Mod: S$GLB,,, | Performed by: FAMILY MEDICINE

## 2022-01-03 PROCEDURE — 99214 PR OFFICE/OUTPT VISIT, EST, LEVL IV, 30-39 MIN: ICD-10-PCS | Mod: S$GLB,,, | Performed by: FAMILY MEDICINE

## 2022-01-03 PROCEDURE — 3078F PR MOST RECENT DIASTOLIC BLOOD PRESSURE < 80 MM HG: ICD-10-PCS | Mod: S$GLB,,, | Performed by: FAMILY MEDICINE

## 2022-01-03 PROCEDURE — 3288F FALL RISK ASSESSMENT DOCD: CPT | Mod: S$GLB,,, | Performed by: FAMILY MEDICINE

## 2022-01-03 PROCEDURE — 3074F PR MOST RECENT SYSTOLIC BLOOD PRESSURE < 130 MM HG: ICD-10-PCS | Mod: S$GLB,,, | Performed by: FAMILY MEDICINE

## 2022-01-03 PROCEDURE — 3008F BODY MASS INDEX DOCD: CPT | Mod: S$GLB,,, | Performed by: FAMILY MEDICINE

## 2022-01-03 RX ORDER — ESTRADIOL 0.1 MG/G
4 CREAM VAGINAL
Qty: 3 EACH | Refills: 1 | Status: SHIPPED | OUTPATIENT
Start: 2022-01-03 | End: 2022-04-18 | Stop reason: SDUPTHER

## 2022-01-03 RX ORDER — HYDROCODONE BITARTRATE AND ACETAMINOPHEN 5; 325 MG/1; MG/1
1 TABLET ORAL EVERY 6 HOURS PRN
Qty: 180 TABLET | Refills: 0 | Status: SHIPPED | OUTPATIENT
Start: 2022-01-03 | End: 2022-04-18

## 2022-01-03 RX ORDER — NALOXONE HYDROCHLORIDE 4 MG/.1ML
1 SPRAY NASAL ONCE
Qty: 1 EACH | Refills: 0 | Status: SHIPPED | OUTPATIENT
Start: 2022-01-03 | End: 2022-01-03

## 2022-01-03 RX ORDER — DEXTROAMPHETAMINE SACCHARATE, AMPHETAMINE ASPARTATE, DEXTROAMPHETAMINE SULFATE AND AMPHETAMINE SULFATE 5; 5; 5; 5 MG/1; MG/1; MG/1; MG/1
1 TABLET ORAL DAILY
Qty: 90 TABLET | Refills: 0 | Status: SHIPPED | OUTPATIENT
Start: 2022-01-03 | End: 2022-04-18 | Stop reason: SDUPTHER

## 2022-01-03 RX ORDER — ALPRAZOLAM 1 MG/1
1 TABLET ORAL NIGHTLY PRN
Qty: 90 TABLET | Refills: 1 | Status: SHIPPED | OUTPATIENT
Start: 2022-01-03 | End: 2022-04-18 | Stop reason: SDUPTHER

## 2022-01-03 NOTE — PROGRESS NOTES
SUBJECTIVE:    Patient ID: Stephanie Knox is a 66 y.o. female.    Chief Complaint: Regular Check Up, discuss specific hip replacement procedure, HepC/HIV Screening Due, c/o worsening incontinence, SMI for dexa, and colonoscopy complete within 5 years    Pt here to checkup on acute and chronic conditions.    Anxiety and depression is doing ok.  Prozac has been doing. Has been able to go places much easier.   (Has done biofeedback and psychology in the past, and that doesn't seem to help;failed trintellix.)    Focus is doing ok on adderall as needed.     Sleeping ok on 2 ambien and doxepin, getting a good 5-6 hours. (failed trazodone, nightmares)    Continues to have chronic neck pain, which she is able to tolerate. It is stiff and hurts.    Pt has been having a lot of problems with her hips. Dr. Quinn has sent her to see Dr. Malloy. Trying to find someone to do it through a robotic procedure.   Continues to have inner right groin pain.  Stopped aqua therapy . The pain is starting to make her fear falling. Still has some issues getting into her car.   Her feet are doing ok as long as she wears orthotics.   Takes hydrocodone and flexeril as needed for her hip, ibuprofen helps. Tends to grunt through the pain all day, and not breathing as well. Weight stable since last visit.      Smokes about 3 cigarettes a day, after eating.    No recent labs done.  ---------------------------------------------------------------------  Pt had cscope in 10/2019, had polyps and has to repeat 2-3 years  Last mammogram UTD.   Eye exam UTD at [facility]      Lab Visit on 09/10/2021   Component Date Value Ref Range Status    Sodium 09/10/2021 143  136 - 145 mmol/L Final    Potassium 09/10/2021 4.3  3.5 - 5.1 mmol/L Final    Chloride 09/10/2021 111* 95 - 110 mmol/L Final    CO2 09/10/2021 22* 23 - 29 mmol/L Final    Glucose 09/10/2021 102  70 - 110 mg/dL Final    BUN 09/10/2021 16  8 - 23 mg/dL Final    Creatinine  09/10/2021 1.1  0.5 - 1.4 mg/dL Final    Calcium 09/10/2021 9.5  8.7 - 10.5 mg/dL Final    Total Protein 09/10/2021 7.6  6.0 - 8.4 g/dL Final    Albumin 09/10/2021 4.3  3.5 - 5.2 g/dL Final    Total Bilirubin 09/10/2021 1.0  0.1 - 1.0 mg/dL Final    Alkaline Phosphatase 09/10/2021 139* 55 - 135 U/L Final    AST 09/10/2021 51* 10 - 40 U/L Final    ALT 09/10/2021 53* 10 - 44 U/L Final    Anion Gap 09/10/2021 10  8 - 16 mmol/L Final    eGFR if African American 09/10/2021 >60.0  >60 mL/min/1.73 m^2 Final    eGFR if non  09/10/2021 52.8* >60 mL/min/1.73 m^2 Final    Cholesterol 09/10/2021 177  120 - 199 mg/dL Final    Triglycerides 09/10/2021 210* 30 - 150 mg/dL Final    HDL 09/10/2021 27* 40 - 75 mg/dL Final    LDL Cholesterol 09/10/2021 108.0  63.0 - 159.0 mg/dL Final    HDL/Cholesterol Ratio 09/10/2021 15.3* 20.0 - 50.0 % Final    Total Cholesterol/HDL Ratio 09/10/2021 6.6* 2.0 - 5.0 Final    Non-HDL Cholesterol 09/10/2021 150  mg/dL Final    Specimen UA 09/10/2021 Urine, Clean Catch   Final    Color, UA 09/10/2021 Yellow  Yellow, Straw, Doreen Final    Appearance, UA 09/10/2021 Hazy* Clear Final    pH, UA 09/10/2021 6.0  5.0 - 8.0 Final    Specific Gravity, UA 09/10/2021 1.025  1.005 - 1.030 Final    Protein, UA 09/10/2021 Trace* Negative Final    Glucose, UA 09/10/2021 Negative  Negative Final    Ketones, UA 09/10/2021 Negative  Negative Final    Bilirubin (UA) 09/10/2021 1+* Negative Final    Occult Blood UA 09/10/2021 Trace* Negative Final    Nitrite, UA 09/10/2021 Positive* Negative Final    Urobilinogen, UA 09/10/2021 Negative  Negative EU/dL Final    Leukocytes, UA 09/10/2021 2+* Negative Final    WBC 09/10/2021 9.41  3.90 - 12.70 K/uL Final    RBC 09/10/2021 5.06  4.00 - 5.40 M/uL Final    Hemoglobin 09/10/2021 14.5  12.0 - 16.0 g/dL Final    Hematocrit 09/10/2021 45.0  37.0 - 48.5 % Final    MCV 09/10/2021 89  82 - 98 fL Final    MCH 09/10/2021 28.7  27.0 -  31.0 pg Final    MCHC 09/10/2021 32.2  32.0 - 36.0 g/dL Final    RDW 09/10/2021 14.8* 11.5 - 14.5 % Final    Platelets 09/10/2021 306  150 - 450 K/uL Final    MPV 09/10/2021 9.8  9.2 - 12.9 fL Final    Immature Granulocytes 09/10/2021 0.4  0.0 - 0.5 % Final    Gran # (ANC) 09/10/2021 5.4  1.8 - 7.7 K/uL Final    Immature Grans (Abs) 09/10/2021 0.04  0.00 - 0.04 K/uL Final    Lymph # 09/10/2021 2.8  1.0 - 4.8 K/uL Final    Mono # 09/10/2021 1.0  0.3 - 1.0 K/uL Final    Eos # 09/10/2021 0.2  0.0 - 0.5 K/uL Final    Baso # 09/10/2021 0.06  0.00 - 0.20 K/uL Final    nRBC 09/10/2021 0  0 /100 WBC Final    Gran % 09/10/2021 57.4  38.0 - 73.0 % Final    Lymph % 09/10/2021 29.4  18.0 - 48.0 % Final    Mono % 09/10/2021 10.1  4.0 - 15.0 % Final    Eosinophil % 09/10/2021 2.1  0.0 - 8.0 % Final    Basophil % 09/10/2021 0.6  0.0 - 1.9 % Final    Differential Method 09/10/2021 Automated   Final    Benzodiazepines 09/10/2021 Presumptive Positive* Negative Final    Cocaine (Metab.) 09/10/2021 Negative  Negative Final    Opiate Scrn, Ur 09/10/2021 Presumptive Positive* Negative Final    Barbiturate Screen, Ur 09/10/2021 Negative  Negative Final    Amphetamine Screen, Ur 09/10/2021 Presumptive Positive* Negative Final    THC 09/10/2021 Negative  Negative Final    Phencyclidine 09/10/2021 Negative  Negative Final    Creatinine, Urine 09/10/2021 239.0  15.0 - 325.0 mg/dL Final    Toxicology Information 09/10/2021 SEE COMMENT   Final    RBC, UA 09/10/2021 3  0 - 4 /hpf Final    WBC, UA 09/10/2021 >100* 0 - 5 /hpf Final    Bacteria 09/10/2021 Many* None-Occ /hpf Final    Squam Epithel, UA 09/10/2021 1  /hpf Final    Hyaline Casts, UA 09/10/2021 5* 0-1/lpf /lpf Final    Microscopic Comment 09/10/2021 SEE COMMENT   Final       Past Medical History:   Diagnosis Date    ADHD (attention deficit hyperactivity disorder)     Anxiety     Arthritis     Colon polyps     Depression     Hyperlipidemia      Insomnia     Memory difficulties      Social History     Socioeconomic History    Marital status:    Tobacco Use    Smoking status: Current Every Day Smoker     Packs/day: 0.25    Smokeless tobacco: Never Used   Substance and Sexual Activity    Alcohol use: Not Currently    Drug use: Never     Past Surgical History:   Procedure Laterality Date    TONSILLECTOMY       Family History   Problem Relation Age of Onset    Emphysema Mother     Diabetes Father     Breast cancer Maternal Aunt        Review of patient's allergies indicates:  No Known Allergies    Current Outpatient Medications:     atorvastatin (LIPITOR) 20 MG tablet, Take 1 tablet (20 mg total) by mouth once daily., Disp: 90 tablet, Rfl: 1    cyclobenzaprine (FLEXERIL) 10 MG tablet, Take 1 tablet (10 mg total) by mouth 3 (three) times daily as needed., Disp: 270 tablet, Rfl: 1    doxepin (SINEQUAN) 100 MG capsule, Take 1 capsule (100 mg total) by mouth every evening., Disp: 90 capsule, Rfl: 1    FLUoxetine 40 MG capsule, Take 1 capsule (40 mg total) by mouth once daily., Disp: 90 capsule, Rfl: 1    Lactobacillus rhamnosus GG (CULTURELLE) 10 billion cell capsule, Take 1 capsule by mouth once daily., Disp: , Rfl:     magnesium oxide (MAG-OX) 400 mg (241.3 mg magnesium) tablet, Take 400 mg by mouth once daily., Disp: , Rfl:     phenazopyridine (PYRIDIUM) 200 MG tablet, Take 1 tablet (200 mg total) by mouth 3 (three) times daily as needed for Pain., Disp: 30 tablet, Rfl: 0    zolpidem (AMBIEN) 10 mg Tab, Take 2 tablets (20 mg total) by mouth every evening., Disp: 180 tablet, Rfl: 1    ALPRAZolam (XANAX) 1 MG tablet, Take 1 tablet (1 mg total) by mouth nightly as needed for Anxiety., Disp: 90 tablet, Rfl: 1    dextroamphetamine-amphetamine (ADDERALL) 20 mg tablet, Take 1 tablet by mouth once daily., Disp: 90 tablet, Rfl: 0    estradioL (ESTRACE) 0.01 % (0.1 mg/gram) vaginal cream, Place 4 g vaginally twice a week., Disp: 3 each, Rfl:  "1    HYDROcodone-acetaminophen (NORCO) 5-325 mg per tablet, Take 1 tablet by mouth every 6 (six) hours as needed., Disp: 180 tablet, Rfl: 0    levomefolate-algal oil (DEPLIN, ALGAL OIL,) 7.5-90.314 mg Cap, Take 1 capsule by mouth once daily., Disp: 90 capsule, Rfl: 1    Review of Systems   Constitutional: Negative for appetite change, fatigue, fever and unexpected weight change.   Respiratory: Negative for cough, chest tightness, shortness of breath and wheezing.    Cardiovascular: Negative for chest pain and leg swelling.   Gastrointestinal: Negative for abdominal pain, constipation, nausea and vomiting.        -heartburn   Genitourinary: Negative for difficulty urinating, dysuria, frequency and urgency.   Musculoskeletal: Positive for arthralgias (right hip) and neck pain. Negative for back pain and myalgias.        +right groin pain   Skin: Negative for rash.   Neurological: Positive for numbness. Negative for dizziness, weakness and headaches.   Hematological: Does not bruise/bleed easily.   Psychiatric/Behavioral: Positive for dysphoric mood and sleep disturbance. Negative for suicidal ideas. The patient is not nervous/anxious.    All other systems reviewed and are negative.         Objective:      Vitals:    01/03/22 1452   BP: 118/74   Pulse: 81   Weight: 88.9 kg (196 lb)   Height: 5' 7" (1.702 m)     Wt Readings from Last 3 Encounters:   01/03/22 88.9 kg (196 lb)   10/18/21 90.3 kg (199 lb)   10/14/21 90.3 kg (199 lb)       Physical Exam  Vitals reviewed.   Constitutional:       General: She is not in acute distress.     Appearance: Normal appearance. She is well-developed. She is obese.   HENT:      Head: Normocephalic and atraumatic.   Neck:      Thyroid: No thyromegaly.   Cardiovascular:      Rate and Rhythm: Normal rate and regular rhythm.      Heart sounds: Normal heart sounds. No murmur heard.  No friction rub.   Pulmonary:      Effort: Pulmonary effort is normal.      Breath sounds: Normal breath " sounds. No wheezing or rales.   Abdominal:      General: Bowel sounds are normal. There is no distension.      Palpations: Abdomen is soft.      Tenderness: There is no abdominal tenderness.   Musculoskeletal:      Cervical back: Neck supple.   Lymphadenopathy:      Cervical: No cervical adenopathy.   Skin:     General: Skin is warm and dry.      Findings: No rash.   Neurological:      Mental Status: She is alert and oriented to person, place, and time.   Psychiatric:         Attention and Perception: She is attentive.         Speech: Speech normal.         Behavior: Behavior normal.         Thought Content: Thought content normal.         Judgment: Judgment normal.           Assessment:       1. Panic disorder without agoraphobia    2. Attention deficit hyperactivity disorder (ADHD), predominantly inattentive type    3. Arthralgia of right hip    4. Chronic insomnia    5. Smoker    6. Medication refill         Plan:       Panic disorder without agoraphobia  Comments:  Controlled. On prozac and prn Xanax. Will continue to monitor for symptoms  Orders:  -     ALPRAZolam (XANAX) 1 MG tablet; Take 1 tablet (1 mg total) by mouth nightly as needed for Anxiety.  Dispense: 90 tablet; Refill: 1    Attention deficit hyperactivity disorder (ADHD), predominantly inattentive type  Comments:  Focus controlled. Will continue to monitor tolerability and effectiveness of Adderall  Orders:  -     dextroamphetamine-amphetamine (ADDERALL) 20 mg tablet; Take 1 tablet by mouth once daily.  Dispense: 90 tablet; Refill: 0    Arthralgia of right hip  Comments:  Symptomatic. To continue to follow with Dr. aMlloy.  Orders:  -     HYDROcodone-acetaminophen (NORCO) 5-325 mg per tablet; Take 1 tablet by mouth every 6 (six) hours as needed.  Dispense: 180 tablet; Refill: 0  -     naloxone (NARCAN) 4 mg/actuation Spry; 1 spray (4 mg total) by Nasal route once. for 1 dose  Dispense: 1 each; Refill: 0    Chronic insomnia  Comments:  Stable. To  continue ambien and doxepin on current medication regimen    Smoker  Comments:  Pt encouraged to stop smoking before surgery    Medication refill  -     estradioL (ESTRACE) 0.01 % (0.1 mg/gram) vaginal cream; Place 4 g vaginally twice a week.  Dispense: 3 each; Refill: 1    Chronic Pain Notes:  Have discussed the risks and benefits of chronic narcotic therapy including pain control, dependence, and addiction.  The patient is aware and accepts the risks and benefits.  Patient is aware of the risk of taking narcotics combined with benzodiazepines/muscle relaxers/hypnotics, including but not limited to breathing difficulties, coma, and death; accepts the risks; and agrees to use medications only as prescribed.    No follow-ups on file.        1/7/2022 [place]

## 2022-01-10 ENCOUNTER — TELEPHONE (OUTPATIENT)
Dept: FAMILY MEDICINE | Facility: CLINIC | Age: 67
End: 2022-01-10
Payer: MEDICARE

## 2022-01-10 DIAGNOSIS — M25.551 ARTHRALGIA OF RIGHT HIP: ICD-10-CM

## 2022-01-10 DIAGNOSIS — M25.551 RIGHT HIP PAIN: Primary | ICD-10-CM

## 2022-01-10 NOTE — TELEPHONE ENCOUNTER
----- Message from Eliane Hu sent at 1/10/2022  4:03 PM CST -----  Patient called and stated that Dr Quinn but he does not do hip replacement so she went to see Dr Malloy but her  cousin went to a doctor in [place] Dr Sofia (phone # [phone] no fax number )  and she made an appointment to see him and she want a referral to him please give her a call at [phone]

## 2022-01-10 NOTE — TELEPHONE ENCOUNTER
----- Message from Citlalli Waters sent at 1/10/2022  3:42 PM CST -----  Vm-  pt needs a new referral to another orthopedic doctor   [phone]

## 2022-01-18 ENCOUNTER — TELEPHONE (OUTPATIENT)
Dept: FAMILY MEDICINE | Facility: CLINIC | Age: 67
End: 2022-01-18
Payer: MEDICARE

## 2022-01-18 NOTE — TELEPHONE ENCOUNTER
----- Message from Marti Francis sent at 1/18/2022 11:46 AM CST -----  Pt calling said she has a hip replacement surgery scheduled for 2/22/2022 and was told she needs a physical and surgery clearance no later than one week before surery. She was just seen In office wanted to know does she need another appt of can she drop off the form   # [phone]

## 2022-01-18 NOTE — TELEPHONE ENCOUNTER
----- Message from Eliane Hu sent at 1/18/2022 12:19 PM CST -----  Patient need to have a referral to Dr Abdoulaye Cope for her hip surgery and also she need a clearance for surgery please give her a call at [phone]

## 2022-01-18 NOTE — TELEPHONE ENCOUNTER
Referral faxed just now and I spoke with patient earlier and scheduled her appt for the surgery clearance

## 2022-01-28 ENCOUNTER — TELEPHONE (OUTPATIENT)
Dept: FAMILY MEDICINE | Facility: CLINIC | Age: 67
End: 2022-01-28
Payer: MEDICARE

## 2022-01-28 NOTE — TELEPHONE ENCOUNTER
----- Message from Eliane Hu sent at 1/28/2022 10:55 AM CST -----  Patient called and stated that she need a referral to Dr Luna in [place] he is the doctor doing the surgery phone # [phone] fax # [phone]

## 2022-02-15 ENCOUNTER — OFFICE VISIT (OUTPATIENT)
Dept: FAMILY MEDICINE | Facility: CLINIC | Age: 67
End: 2022-02-15
Payer: MEDICARE

## 2022-02-15 VITALS
HEIGHT: 66 IN | SYSTOLIC BLOOD PRESSURE: 136 MMHG | WEIGHT: 203 LBS | BODY MASS INDEX: 32.62 KG/M2 | DIASTOLIC BLOOD PRESSURE: 80 MMHG | HEART RATE: 96 BPM

## 2022-02-15 DIAGNOSIS — F33.9 CHRONIC RECURRENT MAJOR DEPRESSIVE DISORDER: ICD-10-CM

## 2022-02-15 DIAGNOSIS — F90.0 ATTENTION DEFICIT HYPERACTIVITY DISORDER (ADHD), PREDOMINANTLY INATTENTIVE TYPE: ICD-10-CM

## 2022-02-15 DIAGNOSIS — N39.490 OVERFLOW INCONTINENCE OF URINE: ICD-10-CM

## 2022-02-15 DIAGNOSIS — B37.9 CANDIDIASIS: ICD-10-CM

## 2022-02-15 DIAGNOSIS — Z01.818 PREOP EXAMINATION: Primary | ICD-10-CM

## 2022-02-15 PROCEDURE — 3079F DIAST BP 80-89 MM HG: CPT | Mod: S$GLB,,, | Performed by: FAMILY MEDICINE

## 2022-02-15 PROCEDURE — 3075F SYST BP GE 130 - 139MM HG: CPT | Mod: S$GLB,,, | Performed by: FAMILY MEDICINE

## 2022-02-15 PROCEDURE — 3079F PR MOST RECENT DIASTOLIC BLOOD PRESSURE 80-89 MM HG: ICD-10-PCS | Mod: S$GLB,,, | Performed by: FAMILY MEDICINE

## 2022-02-15 PROCEDURE — 3008F BODY MASS INDEX DOCD: CPT | Mod: S$GLB,,, | Performed by: FAMILY MEDICINE

## 2022-02-15 PROCEDURE — 1160F RVW MEDS BY RX/DR IN RCRD: CPT | Mod: S$GLB,,, | Performed by: FAMILY MEDICINE

## 2022-02-15 PROCEDURE — 99214 PR OFFICE/OUTPT VISIT, EST, LEVL IV, 30-39 MIN: ICD-10-PCS | Mod: S$GLB,,, | Performed by: FAMILY MEDICINE

## 2022-02-15 PROCEDURE — 3075F PR MOST RECENT SYSTOLIC BLOOD PRESS GE 130-139MM HG: ICD-10-PCS | Mod: S$GLB,,, | Performed by: FAMILY MEDICINE

## 2022-02-15 PROCEDURE — 3008F PR BODY MASS INDEX (BMI) DOCUMENTED: ICD-10-PCS | Mod: S$GLB,,, | Performed by: FAMILY MEDICINE

## 2022-02-15 PROCEDURE — 1160F PR REVIEW ALL MEDS BY PRESCRIBER/CLIN PHARMACIST DOCUMENTED: ICD-10-PCS | Mod: S$GLB,,, | Performed by: FAMILY MEDICINE

## 2022-02-15 PROCEDURE — 99214 OFFICE O/P EST MOD 30 MIN: CPT | Mod: S$GLB,,, | Performed by: FAMILY MEDICINE

## 2022-02-15 RX ORDER — NYSTATIN 100000 U/G
CREAM TOPICAL 2 TIMES DAILY
Qty: 30 G | Refills: 1 | Status: SHIPPED | OUTPATIENT
Start: 2022-02-15 | End: 2022-04-18 | Stop reason: SDUPTHER

## 2022-02-15 NOTE — PROGRESS NOTES
SUBJECTIVE:    Patient ID: Stephanie Knox is a 66 y.o. female.    Chief Complaint: Pre-op Exam (Hip surgery, has some thing to go over, HM will address at regular visit// SW)    Pt here for pre-op clearance for right hip replacement by Dr. Luna (from [place] via [facility]). Being done in [place] on 2/22/2022. Has had labs (CBC, BMP, MRSA, A1c, and PT/PTT) done at [facility] in the last few weeks.     Pt states she was bit by a tic a few weeks ago, and noticed it was there, did not feel it. Removed it from her right back. States it came right out. Has not had any problems since.     Pt also reporting today yeast infection under her breast for about 2 weeks. Has been putting cortisone-10 on it and it is getting better. It was massive and is shrinking some. Doesn't wear a bra at home.     Pt reports having issues with incontinent, will stand up and urine will just come out. Has really gotten bad the last month.  Thinks it due to her nerves. Denies burning, but does have pressure. Has tried going to the bathroom frequently.  Has been wearing pads. Going to the bathroom at night.    Pt with Significant PMHx of Anxiety/Depression    Anxiety and depression has been a little rough. Feels like it is manageable. Worrying a lot about family. Likes to worry a lot.  Continues to take prozac.  Not exactly sleeping well at night, thinks she is afraid to go the hospital. Last time was 42 years ago.  Her mom also spent a lot of time in the hospital.    (Has done biofeedback and psychology in the past, and that doesn't seem to help;failed trintellix.)    Focus is doing ok on adderall as needed.     Sleeping ok on 2 ambien and doxepin, getting a good 5-6 hours. (failed trazodone, nightmares)    Continues to have chronic neck pain, which she is able to tolerate. It is stiff and hurts.    Continues to take tumeric for her joints. Thinks it is helping.     Has not smoked in a week for the surgery.  Plans to stay  quit, gal[facility] due to the cost.    Labs reviewed from myla monteiro done on 2/4/22:WBS 10, fBS 92, GFR 65, INR 0.92, PTT 23.7, MRSA Negative.  ---------------------------------------------------------------------  Pt had cscope in 10/2019, had polyps and has to repeat 2-3 years  Mammogram 3/2021  [facility] exam U[facility] at [facility]      Lab Visit on 09/10/2021   Component Date Value Ref Range Status    Sodium 09/10/2021 143  136 - 145 mmol/L Final    Potassium 09/10/2021 4.3  3.5 - 5.1 mmol/L Final    Chloride 09/10/2021 111 (A) 95 - 110 mmol/L Final    CO2 09/10/2021 22 (A) 23 - 29 mmol/L Final    Glucose 09/10/2021 102  70 - 110 mg/dL Final    BUN 09/10/2021 16  8 - 23 mg/dL Final    Creatinine 09/10/2021 1.1  0.5 - 1.4 mg/dL Final    Calcium 09/10/2021 9.5  8.7 - 10.5 mg/dL Final    Total Protein 09/10/2021 7.6  6.0 - 8.4 g/dL Final    Albumin 09/10/2021 4.3  3.5 - 5.2 g/dL Final    Total Bilirubin 09/10/2021 1.0  0.1 - 1.0 mg/dL Final    Alkaline Phosphatase 09/10/2021 139 (A) 55 - 135 U/L Final    AST 09/10/2021 51 (A) 10 - 40 U/L Final    ALT 09/10/2021 53 (A) 10 - 44 U/L Final    Anion Gap 09/10/2021 10  8 - 16 mmol/L Final    eGFR if African American 09/10/2021 >60.0  >60 mL/min/1.73 m^2 Final    eGFR if non African American 09/10/2021 52.8 (A) >60 mL/min/1.73 m^2 Final    Cholesterol 09/10/2021 177  120 - 199 mg/dL Final    Triglycerides 09/10/2021 210 (A) 30 - 150 mg/dL Final    HDL 09/10/2021 27 (A) 40 - 75 mg/dL Final    LDL Cholesterol 09/10/2021 108.0  63.0 - 159.0 mg/dL Final    HDL/Cholesterol Ratio 09/10/2021 15.3 (A) 20.0 - 50.0 % Final    Total Cholesterol/HDL Ratio 09/10/2021 6.6 (A) 2.0 - 5.0 Final    Non-HDL Cholesterol 09/10/2021 150  mg/dL Final    Specimen UA 09/10/2021 Urine, Clean Catch   Final    Color, UA 09/10/2021 Yellow  Yellow, Straw, Doreen Final    Appearance, UA 09/10/2021 Hazy (A) Clear Final    pH, UA 09/10/2021 6.0  5.0 - 8.0 Final    Specific Gravity, UA  09/10/2021 1.025  1.005 - 1.030 Final    Protein, UA 09/10/2021 Trace (A) Negative Final    Glucose, UA 09/10/2021 Negative  Negative Final    Ketones, UA 09/10/2021 Negative  Negative Final    Bilirubin (UA) 09/10/2021 1+ (A) Negative Final    Occult Blood UA 09/10/2021 Trace (A) Negative Final    Nitrite, UA 09/10/2021 Positive (A) Negative Final    Urobilinogen, UA 09/10/2021 Negative  Negative EU/dL Final    Leukocytes, UA 09/10/2021 2+ (A) Negative Final    WBC 09/10/2021 9.41  3.90 - 12.70 K/uL Final    RBC 09/10/2021 5.06  4.00 - 5.40 M/uL Final    Hemoglobin 09/10/2021 14.5  12.0 - 16.0 g/dL Final    Hematocrit 09/10/2021 45.0  37.0 - 48.5 % Final    MCV 09/10/2021 89  82 - 98 fL Final    MCH 09/10/2021 28.7  27.0 - 31.0 pg Final    MCHC 09/10/2021 32.2  32.0 - 36.0 g/dL Final    RDW 09/10/2021 14.8 (A) 11.5 - 14.5 % Final    Platelets 09/10/2021 306  150 - 450 K/uL Final    MPV 09/10/2021 9.8  9.2 - 12.9 fL Final    Immature Granulocytes 09/10/2021 0.4  0.0 - 0.5 % Final    Gran # (ANC) 09/10/2021 5.4  1.8 - 7.7 K/uL Final    Immature Grans (Abs) 09/10/2021 0.04  0.00 - 0.04 K/uL Final    Lymph # 09/10/2021 2.8  1.0 - 4.8 K/uL Final    Mono # 09/10/2021 1.0  0.3 - 1.0 K/uL Final    Eos # 09/10/2021 0.2  0.0 - 0.5 K/uL Final    Baso # 09/10/2021 0.06  0.00 - 0.20 K/uL Final    nRBC 09/10/2021 0  0 /100 WBC Final    Gran % 09/10/2021 57.4  38.0 - 73.0 % Final    Lymph % 09/10/2021 29.4  18.0 - 48.0 % Final    Mono % 09/10/2021 10.1  4.0 - 15.0 % Final    Eosinophil % 09/10/2021 2.1  0.0 - 8.0 % Final    Basophil % 09/10/2021 0.6  0.0 - 1.9 % Final    Differential Method 09/10/2021 Automated   Final    Benzodiazepines 09/10/2021 Presumptive Positive (A) Negative Final    Cocaine (Metab.) 09/10/2021 Negative  Negative Final    Opiate Scrn, Ur 09/10/2021 Presumptive Positive (A) Negative Final    Barbiturate Screen, Ur 09/10/2021 Negative  Negative Final    Amphetamine  Screen, Ur 09/10/2021 Presumptive Positive (A) Negative Final    THC 09/10/2021 Negative  Negative Final    Phencyclidine 09/10/2021 Negative  Negative Final    Creatinine, Urine 09/10/2021 239.0  15.0 - 325.0 mg/dL Final    Toxicology Information 09/10/2021 SEE COMMENT   Final    RBC, UA 09/10/2021 3  0 - 4 /hpf Final    WBC, UA 09/10/2021 >100 (A) 0 - 5 /hpf Final    Bacteria 09/10/2021 Many (A) None-Occ /hpf Final    Squam Epithel, UA 09/10/2021 1  /hpf Final    Hyaline Casts, UA 09/10/2021 5 (A) 0-1/lpf /lpf Final    Microscopic Comment 09/10/2021 SEE COMMENT   Final       Past Medical History:   Diagnosis Date    ADHD (attention deficit hyperactivity disorder)     Anxiety     Arthritis     Colon polyps     Depression     Hyperlipidemia     Insomnia     Memory difficulties      Social History     Socioeconomic History    Marital status:    Tobacco Use    Smoking status: Current Every Day Smoker     Packs/day: 0.25    Smokeless tobacco: Never Used   Substance and Sexual Activity    Alcohol use: Not Currently    Drug use: Never     Past Surgical History:   Procedure Laterality Date    TONSILLECTOMY       Family History   Problem Relation Age of Onset    Emphysema Mother     Diabetes Father     Breast cancer Maternal Aunt        Review of patient's allergies indicates:  No Known Allergies    Current Outpatient Medications:     ALPRAZolam (XANAX) 1 MG tablet, Take 1 tablet (1 mg total) by mouth nightly as needed for Anxiety., Disp: 90 tablet, Rfl: 1    atorvastatin (LIPITOR) 20 MG tablet, Take 1 tablet (20 mg total) by mouth once daily., Disp: 90 tablet, Rfl: 1    cyclobenzaprine (FLEXERIL) 10 MG tablet, Take 1 tablet (10 mg total) by mouth 3 (three) times daily as needed., Disp: 270 tablet, Rfl: 1    dextroamphetamine-amphetamine (ADDERALL) 20 mg tablet, Take 1 tablet by mouth once daily., Disp: 90 tablet, Rfl: 0    doxepin (SINEQUAN) 100 MG capsule, Take 1 capsule (100 mg  total) by mouth every evening., Disp: 90 capsule, Rfl: 1    estradioL (ESTRACE) 0.01 % (0.1 mg/gram) vaginal cream, Place 4 g vaginally twice a week., Disp: 3 each, Rfl: 1    FLUoxetine 40 MG capsule, Take 1 capsule (40 mg total) by mouth once daily., Disp: 90 capsule, Rfl: 1    HYDROcodone-acetaminophen (NORCO) 5-325 mg per tablet, Take 1 tablet by mouth every 6 (six) hours as needed., Disp: 180 tablet, Rfl: 0    Lactobacillus rhamnosus GG (CULTURELLE) 10 billion cell capsule, Take 1 capsule by mouth once daily., Disp: , Rfl:     levomefolate-algal oil (DEPLIN, ALGAL OIL,) 7.5-90.314 mg Cap, Take 1 capsule by mouth once daily., Disp: 90 capsule, Rfl: 1    magnesium oxide (MAG-OX) 400 mg (241.3 mg magnesium) tablet, Take 400 mg by mouth once daily., Disp: , Rfl:     phenazopyridine (PYRIDIUM) 200 MG tablet, Take 1 tablet (200 mg total) by mouth 3 (three) times daily as needed for Pain., Disp: 30 tablet, Rfl: 0    zolpidem (AMBIEN) 10 mg Tab, Take 2 tablets (20 mg total) by mouth every evening., Disp: 180 tablet, Rfl: 1    ciprofloxacin HCl (CIPRO) 500 MG tablet, Take 1 tablet (500 mg total) by mouth every 12 (twelve) hours., Disp: 6 tablet, Rfl: 0    nystatin (MYCOSTATIN) cream, Apply topically 2 (two) times daily., Disp: 30 g, Rfl: 1    Review of Systems   Constitutional: Negative for appetite change, fatigue, fever and unexpected weight change.   Respiratory: Negative for cough, chest tightness, shortness of breath and wheezing.    Cardiovascular: Negative for chest pain and leg swelling.   Gastrointestinal: Negative for abdominal pain, constipation, nausea and vomiting.        -heartburn   Genitourinary: Negative for difficulty urinating, dysuria, frequency and urgency.        +urinary incontinence   Musculoskeletal: Positive for arthralgias (right hip) and neck pain. Negative for back pain and myalgias.        +right groin pain   Skin: Negative for rash.   Neurological: Positive for numbness. Negative  "for dizziness, weakness and headaches.   Hematological: Does not bruise/bleed easily.   Psychiatric/Behavioral: Positive for dysphoric mood and sleep disturbance. Negative for suicidal ideas. The patient is not nervous/anxious.    All other systems reviewed and are negative.         Objective:      Vitals:    02/15/22 1059   BP: 136/80   Pulse: 96   Weight: 92.1 kg (203 lb)   Height: 5' 6" (1.676 m)     Physical Exam  Vitals reviewed.   Constitutional:       General: She is not in acute distress.     Appearance: Normal appearance. She is well-developed. She is obese.   HENT:      Head: Normocephalic and atraumatic.   Neck:      Thyroid: No thyromegaly.      Vascular: No carotid bruit.   Cardiovascular:      Rate and Rhythm: Normal rate and regular rhythm.      Heart sounds: Normal heart sounds. No murmur heard.  No friction rub.   Pulmonary:      Effort: Pulmonary effort is normal.      Breath sounds: Normal breath sounds. No wheezing or rales.   Abdominal:      General: Bowel sounds are normal. There is no distension.      Palpations: Abdomen is soft.      Tenderness: There is no abdominal tenderness.   Musculoskeletal:      Cervical back: Neck supple.   Lymphadenopathy:      Cervical: No cervical adenopathy.   Skin:     General: Skin is warm and dry.      Findings: No rash.   Neurological:      Mental Status: She is alert and oriented to person, place, and time.   Psychiatric:         Attention and Perception: She is attentive.         Speech: Speech normal.         Behavior: Behavior normal.         Thought Content: Thought content normal.         Judgment: Judgment normal.           Assessment:       1. Preop examination    2. Candidiasis    3. Overflow incontinence of urine    4. Attention deficit hyperactivity disorder (ADHD), predominantly inattentive type    5. Chronic recurrent major depressive disorder         Plan:       Preop examination  Comments:  Pt moderate risk for orthopedic " surgery.    Candidiasis  Comments:  Acute. Given nystatin.   Orders:  -     nystatin (MYCOSTATIN) cream; Apply topically 2 (two) times daily.  Dispense: 30 g; Refill: 1    Overflow incontinence of urine  Comments:  Chronic. To continue conservative care. Will check urine to r/o infection before surgery.   Orders:  -     Urinalysis; Future; Expected date: 02/15/2022  -     Urine culture; Future; Expected date: 02/15/2022    Attention deficit hyperactivity disorder (ADHD), predominantly inattentive type  Comments:  Focus controlled. Will continue to monitor symptoms on Adderall.     Chronic recurrent major depressive disorder  Comments:  Controlled. Will continue to monitor symptoms    Labs and/or tests have been ordered for the evaluation/monitoring of acute/chronic conditions, to be done just before next visit.    Follow up in about 3 months (around 5/15/2022).        2/27/2022 Eron Hill

## 2022-02-18 ENCOUNTER — TELEPHONE (OUTPATIENT)
Dept: FAMILY MEDICINE | Facility: CLINIC | Age: 67
End: 2022-02-18
Payer: MEDICARE

## 2022-02-18 DIAGNOSIS — N30.00 ACUTE CYSTITIS WITHOUT HEMATURIA: Primary | ICD-10-CM

## 2022-02-18 LAB
APPEARANCE UR: CLEAR
BACTERIA UR CULT: ABNORMAL
BILIRUB UR QL STRIP: NEGATIVE
COLOR UR: YELLOW
GLUCOSE UR QL STRIP: NEGATIVE
HGB UR QL STRIP: NEGATIVE
KETONES UR QL STRIP: NEGATIVE
LEUKOCYTE ESTERASE UR QL STRIP: ABNORMAL
NITRITE UR QL STRIP: NEGATIVE
PH UR STRIP: ABNORMAL [PH] (ref 5–8)
PROT UR QL STRIP: NEGATIVE
SP GR UR STRIP: 1.01 (ref 1–1.03)

## 2022-02-18 RX ORDER — CIPROFLOXACIN 500 MG/1
500 TABLET ORAL EVERY 12 HOURS
Qty: 6 TABLET | Refills: 0 | Status: SHIPPED | OUTPATIENT
Start: 2022-02-18 | End: 2022-04-18

## 2022-03-29 NOTE — TELEPHONE ENCOUNTER
----- Message from Eron Hill MD sent at 3/4/2020  2:04 PM CST -----  Please call the patient regarding her abnormal result.    1. Mild joint space narrowing.      ## Pt has no obvious abnormalities of the hips, and mild arthritis of the knee. May benefit from seeing a orthopedist  
Spoke to patient with results verbatim per Dr Hill. Verbalized understanding and states she will hold off on ortho referral for now.   
 64-year-old white male well known to me and was a patient of mine for many years but I have not seen him since 2013. He has had colonoscopy by Dr. Luevano in 2018 with polyps and would be due next year. He also has had prior endoscopy in 2016 with Dr. Luevano with hiatal hernia and reflux. He returns now with diarrhea problems with urgency with 6-12 bowel movements in a given day. He has lost about 20 lb over the last year. The patient has had no bleeding. He has had prior gallbladder surgery in 2014. He is retired. He is eating well but the amount of diarrhea depends on what he eats and how much. There is family history of mother with colon cancer at age 60. He has no cardiac history with no chest pain or shortness of breath. He does work around the house all the time and outside in the yd and walks 2 and 0.5 miles for exercise.

## 2022-04-17 NOTE — PROGRESS NOTES
SUBJECTIVE:    Patient ID: Stephanie Knox is a 66 y.o. female.    Chief Complaint: Follow-up (3mth, brought bottles, wants to talk about depression med, wants to talk about rash on chest x3 days has put nystatin on it, HM declined for now// SW)    Pt here to checkup on acute and chronic conditions.     Pt recently s/p right hip replacement ([facility] (from [place] via [facility])), 2/22/2022. Doing PT here at [facility] in the [facility]. Had some infection in the cut for a while and had to take Keflex. Went to her daughter for a while and did some pool therapy.    Has been having sweating in her head and shoulders. And started scratching in her head and thought she may have had some psoriasis.  Has apparently had some scratching in her sleep as she woke up with scars on her chest. Took benadryl the next night. Has not had a problem since.     Has been using estradiol cream, still having incontinence at times when getting up to go to the bathroom. Has had a normal PVD.     Anxiety and depression had been doing ok.  Feels like she has nothing to be worried about right now. But has been having more break downs lately.  Continues to take prozac.  (Has done biofeedback and psychology in the past, and that doesn't seem to help;failed trintellix,, abilify.)    Sleeping ok on 2 ambien and doxepin, getting a good 5-6 hours. (failed trazodone, nightmares)    Continues to have chronic neck pain, which she is able to tolerate. It is stiff and hurts.    Has not smoked much since surgery, will have one every 2-3 day.     ---------------------------------------------------------------------  Pt had cscope in 10/2019, had polyps and has to repeat 2-3 years  Mammogram 3/2021, due  Eye exam UTD at [facility]      Office Visit on 02/15/2022   Component Date Value Ref Range Status    Color, UA 02/15/2022 YELLOW  YELLOW Final    Appearance, UA 02/15/2022 CLEAR  CLEAR Final    Specific [place], UA 02/15/2022 1.014   1.001 - 1.035 Final    pH, UA 02/15/2022 < OR = 5.0  5.0 - 8.0 Final    Glucose, UA 02/15/2022 NEGATIVE  NEGATIVE Final    Bilirubin, UA 02/15/2022 NEGATIVE  NEGATIVE Final    Ketones, UA 02/15/2022 NEGATIVE  NEGATIVE Final    Occult Blood UA 02/15/2022 NEGATIVE  NEGATIVE Final    Protein, UA 02/15/2022 NEGATIVE  NEGATIVE Final    Nitrite, UA 02/15/2022 NEGATIVE  NEGATIVE Final    Leukocytes, UA 02/15/2022 1+ (A) NEGATIVE Final    Urine Culture, Routine 02/15/2022  (A)  Final       Past Medical History:   Diagnosis Date    ADHD (attention deficit hyperactivity disorder)     Anxiety     Arthritis     Colon polyps     Depression     Hyperlipidemia     Insomnia     Memory difficulties      Social History     Socioeconomic History    Marital status:    Tobacco Use    Smoking status: Current Every Day Smoker     Packs/day: 0.25    Smokeless tobacco: Never Used   Substance and Sexual Activity    Alcohol use: Not Currently    Drug use: Never     Past Surgical History:   Procedure Laterality Date    TONSILLECTOMY       Family History   Problem Relation Age of Onset    Emphysema Mother     Diabetes Father     Breast cancer Maternal Aunt        Review of patient's allergies indicates:  No Known Allergies    Current Outpatient Medications:     atorvastatin (LIPITOR) 20 MG tablet, Take 1 tablet (20 mg total) by mouth once daily., Disp: 90 tablet, Rfl: 1    cyclobenzaprine (FLEXERIL) 10 MG tablet, Take 1 tablet (10 mg total) by mouth 3 (three) times daily as needed., Disp: 270 tablet, Rfl: 1    HYDROcodone-acetaminophen (NORCO)  mg per tablet, Take 1 tablet by mouth every 6 (six) hours as needed., Disp: , Rfl:     Lactobacillus rhamnosus GG (CULTURELLE) 10 billion cell capsule, Take 1 capsule by mouth once daily., Disp: , Rfl:     levomefolate-algal oil (DEPLIN, ALGAL OIL,) 7.5-90.314 mg Cap, Take 1 capsule by mouth once daily., Disp: 90 capsule, Rfl: 1    magnesium oxide (MAG-OX) 400  mg (241.3 mg magnesium) tablet, Take 400 mg by mouth once daily., Disp: , Rfl:     phenazopyridine (PYRIDIUM) 200 MG tablet, Take 1 tablet (200 mg total) by mouth 3 (three) times daily as needed for Pain., Disp: 30 tablet, Rfl: 0    ALPRAZolam (XANAX) 1 MG tablet, Take 1 tablet (1 mg total) by mouth nightly as needed for Anxiety., Disp: 90 tablet, Rfl: 1    busPIRone (BUSPAR) 15 MG tablet, Take 1 tablet (15 mg total) by mouth 2 (two) times daily., Disp: 180 tablet, Rfl: 1    dextroamphetamine-amphetamine (ADDERALL) 20 mg tablet, Take 1 tablet by mouth once daily., Disp: 90 tablet, Rfl: 0    doxepin (SINEQUAN) 100 MG capsule, Take 1 capsule (100 mg total) by mouth every evening., Disp: 90 capsule, Rfl: 1    estradioL (ESTRACE) 0.01 % (0.1 mg/gram) vaginal cream, Place 4 g vaginally twice a week., Disp: 3 each, Rfl: 1    FLUoxetine 40 MG capsule, Take 1 capsule (40 mg total) by mouth once daily., Disp: 90 capsule, Rfl: 1    nystatin (MYCOSTATIN) cream, Apply topically 2 (two) times daily., Disp: 30 g, Rfl: 1    zolpidem (AMBIEN) 10 mg Tab, Take 2 tablets (20 mg total) by mouth every evening., Disp: 180 tablet, Rfl: 1    Review of Systems   Constitutional: Negative for appetite change, fatigue, fever and unexpected weight change.   Respiratory: Negative for cough, chest tightness, shortness of breath and wheezing.    Cardiovascular: Negative for chest pain and leg swelling.   Gastrointestinal: Negative for abdominal pain, constipation, nausea and vomiting.        -heartburn   Genitourinary: Negative for difficulty urinating, dysuria, frequency and urgency.   Musculoskeletal: Positive for arthralgias (right hip) and neck pain. Negative for back pain and myalgias.        +right groin pain   Skin: Positive for rash.   Neurological: Positive for numbness. Negative for dizziness, weakness and headaches.   Hematological: Does not bruise/bleed easily.   Psychiatric/Behavioral: Positive for dysphoric mood and sleep  "disturbance. Negative for suicidal ideas. The patient is not nervous/anxious.    All other systems reviewed and are negative.         Objective:      Vitals:    04/18/22 1045   BP: 128/72   Pulse: 88   Weight: 89.8 kg (198 lb)   Height: 5' 6" (1.676 m)     Physical Exam  Vitals reviewed.   Constitutional:       General: She is not in acute distress.     Appearance: Normal appearance. She is well-developed. She is obese.   HENT:      Head: Normocephalic and atraumatic.   Neck:      Thyroid: No thyromegaly.   Cardiovascular:      Rate and Rhythm: Normal rate and regular rhythm.      Heart sounds: Normal heart sounds. No murmur heard.    No friction rub.   Pulmonary:      Effort: Pulmonary effort is normal.      Breath sounds: Normal breath sounds. No wheezing or rales.   Abdominal:      General: Bowel sounds are normal. There is no distension.      Palpations: Abdomen is soft.      Tenderness: There is no abdominal tenderness.   Musculoskeletal:      Cervical back: Neck supple.   Lymphadenopathy:      Cervical: No cervical adenopathy.   Skin:     General: Skin is warm and dry.      Findings: Rash present.      Comments: Excoriations on the chest, with erythema. No evidence of infection   Neurological:      Mental Status: She is alert and oriented to person, place, and time.   Psychiatric:         Attention and Perception: She is attentive.         Speech: Speech normal.         Behavior: Behavior normal.         Thought Content: Thought content normal.         Judgment: Judgment normal.           Assessment:       1. Chronic insomnia    2. Panic disorder without agoraphobia    3. Chronic recurrent major depressive disorder    4. Sensation of pressure in bladder area    5. Attention deficit hyperactivity disorder (ADHD), predominantly inattentive type    6. Medication refill    7. Screening mammogram, encounter for    8. Dermatitis         Plan:       Chronic insomnia  Comments:  Stable. To continue on ambien/doxepin.  " Will continue on monitor symptoms.  Orders:  -     doxepin (SINEQUAN) 100 MG capsule; Take 1 capsule (100 mg total) by mouth every evening.  Dispense: 90 capsule; Refill: 1  -     zolpidem (AMBIEN) 10 mg Tab; Take 2 tablets (20 mg total) by mouth every evening.  Dispense: 180 tablet; Refill: 1    Panic disorder without agoraphobia  Comments:  Symptomatic. On prozac and prn Xanax. Will add buspar. Will continue to monitor for symptoms  Orders:  -     ALPRAZolam (XANAX) 1 MG tablet; Take 1 tablet (1 mg total) by mouth nightly as needed for Anxiety.  Dispense: 90 tablet; Refill: 1  -     busPIRone (BUSPAR) 15 MG tablet; Take 1 tablet (15 mg total) by mouth 2 (two) times daily.  Dispense: 180 tablet; Refill: 1    Chronic recurrent major depressive disorder  Comments:  Controlled. Will add buspar and reassess  Orders:  -     Discontinue: FLUoxetine 40 MG capsule; Take 1 capsule (40 mg total) by mouth once daily.  Dispense: 90 capsule; Refill: 1  -     Discontinue: FLUoxetine 40 MG capsule; Take 1 capsule (40 mg total) by mouth once daily.  Dispense: 90 capsule; Refill: 1  -     FLUoxetine 40 MG capsule; Take 1 capsule (40 mg total) by mouth once daily.  Dispense: 90 capsule; Refill: 1    Sensation of pressure in bladder area  Comments:  Will get PVD    Attention deficit hyperactivity disorder (ADHD), predominantly inattentive type  Comments:  Focus controlled. Will continue to monitor tolerability and effectiveness of Adderall  Orders:  -     dextroamphetamine-amphetamine (ADDERALL) 20 mg tablet; Take 1 tablet by mouth once daily.  Dispense: 90 tablet; Refill: 0    Medication refill  -     estradioL (ESTRACE) 0.01 % (0.1 mg/gram) vaginal cream; Place 4 g vaginally twice a week.  Dispense: 3 each; Refill: 1  -     nystatin (MYCOSTATIN) cream; Apply topically 2 (two) times daily.  Dispense: 30 g; Refill: 1    Screening mammogram, encounter for  Comments:  Mammogram order sen [facility]to [facility]  Orders:  -     Mammo Digital Screening  Jame Pardo; Future; Expected date: 04/18/2022    Dermatitis  Comments:  Resolving. Pt to use prn benadryl and notify if improves.      Follow up in about 6 weeks (around 5/30/2022) for Anxiety.        4/18/2022 Eron Hill

## 2022-04-18 ENCOUNTER — OFFICE VISIT (OUTPATIENT)
Dept: FAMILY MEDICINE | Facility: CLINIC | Age: 67
End: 2022-04-18
Payer: MEDICARE

## 2022-04-18 VITALS
DIASTOLIC BLOOD PRESSURE: 72 MMHG | SYSTOLIC BLOOD PRESSURE: 128 MMHG | WEIGHT: 198 LBS | HEIGHT: 66 IN | BODY MASS INDEX: 31.82 KG/M2 | HEART RATE: 88 BPM

## 2022-04-18 DIAGNOSIS — Z76.0 MEDICATION REFILL: ICD-10-CM

## 2022-04-18 DIAGNOSIS — Z12.31 SCREENING MAMMOGRAM, ENCOUNTER FOR: ICD-10-CM

## 2022-04-18 DIAGNOSIS — F41.0 PANIC DISORDER WITHOUT AGORAPHOBIA: ICD-10-CM

## 2022-04-18 DIAGNOSIS — R39.89 SENSATION OF PRESSURE IN BLADDER AREA: ICD-10-CM

## 2022-04-18 DIAGNOSIS — F51.04 CHRONIC INSOMNIA: Primary | ICD-10-CM

## 2022-04-18 DIAGNOSIS — F90.0 ATTENTION DEFICIT HYPERACTIVITY DISORDER (ADHD), PREDOMINANTLY INATTENTIVE TYPE: ICD-10-CM

## 2022-04-18 DIAGNOSIS — F33.9 CHRONIC RECURRENT MAJOR DEPRESSIVE DISORDER: ICD-10-CM

## 2022-04-18 DIAGNOSIS — L30.9 DERMATITIS: ICD-10-CM

## 2022-04-18 PROCEDURE — 3074F SYST BP LT 130 MM HG: CPT | Mod: S$GLB,,, | Performed by: FAMILY MEDICINE

## 2022-04-18 PROCEDURE — 1160F PR REVIEW ALL MEDS BY PRESCRIBER/CLIN PHARMACIST DOCUMENTED: ICD-10-PCS | Mod: S$GLB,,, | Performed by: FAMILY MEDICINE

## 2022-04-18 PROCEDURE — 3078F PR MOST RECENT DIASTOLIC BLOOD PRESSURE < 80 MM HG: ICD-10-PCS | Mod: S$GLB,,, | Performed by: FAMILY MEDICINE

## 2022-04-18 PROCEDURE — 99214 PR OFFICE/OUTPT VISIT, EST, LEVL IV, 30-39 MIN: ICD-10-PCS | Mod: S$GLB,,, | Performed by: FAMILY MEDICINE

## 2022-04-18 PROCEDURE — 3074F PR MOST RECENT SYSTOLIC BLOOD PRESSURE < 130 MM HG: ICD-10-PCS | Mod: S$GLB,,, | Performed by: FAMILY MEDICINE

## 2022-04-18 PROCEDURE — 3008F BODY MASS INDEX DOCD: CPT | Mod: S$GLB,,, | Performed by: FAMILY MEDICINE

## 2022-04-18 PROCEDURE — 99214 OFFICE O/P EST MOD 30 MIN: CPT | Mod: S$GLB,,, | Performed by: FAMILY MEDICINE

## 2022-04-18 PROCEDURE — 3008F PR BODY MASS INDEX (BMI) DOCUMENTED: ICD-10-PCS | Mod: S$GLB,,, | Performed by: FAMILY MEDICINE

## 2022-04-18 PROCEDURE — 3078F DIAST BP <80 MM HG: CPT | Mod: S$GLB,,, | Performed by: FAMILY MEDICINE

## 2022-04-18 PROCEDURE — 1160F RVW MEDS BY RX/DR IN RCRD: CPT | Mod: S$GLB,,, | Performed by: FAMILY MEDICINE

## 2022-04-18 RX ORDER — BUSPIRONE HYDROCHLORIDE 15 MG/1
15 TABLET ORAL 2 TIMES DAILY
Qty: 180 TABLET | Refills: 1 | Status: SHIPPED | OUTPATIENT
Start: 2022-04-18 | End: 2022-06-03 | Stop reason: SDUPTHER

## 2022-04-18 RX ORDER — ZOLPIDEM TARTRATE 10 MG/1
20 TABLET ORAL NIGHTLY
Qty: 180 TABLET | Refills: 1 | Status: SHIPPED | OUTPATIENT
Start: 2022-04-18 | End: 2022-06-03 | Stop reason: SDUPTHER

## 2022-04-18 RX ORDER — NYSTATIN 100000 U/G
CREAM TOPICAL 2 TIMES DAILY
Qty: 30 G | Refills: 1 | Status: SHIPPED | OUTPATIENT
Start: 2022-04-18 | End: 2022-06-03 | Stop reason: SDUPTHER

## 2022-04-18 RX ORDER — FLUOXETINE HYDROCHLORIDE 40 MG/1
40 CAPSULE ORAL DAILY
Qty: 90 CAPSULE | Refills: 1 | Status: SHIPPED | OUTPATIENT
Start: 2022-04-18 | End: 2022-06-03 | Stop reason: SDUPTHER

## 2022-04-18 RX ORDER — DEXTROAMPHETAMINE SACCHARATE, AMPHETAMINE ASPARTATE, DEXTROAMPHETAMINE SULFATE AND AMPHETAMINE SULFATE 5; 5; 5; 5 MG/1; MG/1; MG/1; MG/1
1 TABLET ORAL DAILY
Qty: 90 TABLET | Refills: 0 | Status: SHIPPED | OUTPATIENT
Start: 2022-04-18 | End: 2022-06-03 | Stop reason: SDUPTHER

## 2022-04-18 RX ORDER — PHENAZOPYRIDINE HYDROCHLORIDE 200 MG/1
200 TABLET, FILM COATED ORAL 3 TIMES DAILY PRN
Qty: 30 TABLET | Refills: 0 | Status: CANCELLED | OUTPATIENT
Start: 2022-04-18

## 2022-04-18 RX ORDER — ALPRAZOLAM 1 MG/1
1 TABLET ORAL NIGHTLY PRN
Qty: 90 TABLET | Refills: 1 | Status: SHIPPED | OUTPATIENT
Start: 2022-04-18 | End: 2022-06-03 | Stop reason: SDUPTHER

## 2022-04-18 RX ORDER — FLUOXETINE HYDROCHLORIDE 40 MG/1
40 CAPSULE ORAL DAILY
Qty: 90 CAPSULE | Refills: 1 | OUTPATIENT
Start: 2022-04-18 | End: 2022-04-18

## 2022-04-18 RX ORDER — ESTRADIOL 0.1 MG/G
4 CREAM VAGINAL
Qty: 3 EACH | Refills: 1 | Status: SHIPPED | OUTPATIENT
Start: 2022-04-18 | End: 2022-06-03 | Stop reason: SDUPTHER

## 2022-04-18 RX ORDER — HYDROCODONE BITARTRATE AND ACETAMINOPHEN 10; 325 MG/1; MG/1
1 TABLET ORAL EVERY 6 HOURS PRN
COMMUNITY
Start: 2022-01-03 | End: 2022-06-03 | Stop reason: SDUPTHER

## 2022-04-18 RX ORDER — DOXEPIN HYDROCHLORIDE 100 MG/1
100 CAPSULE ORAL NIGHTLY
Qty: 90 CAPSULE | Refills: 1 | Status: SHIPPED | OUTPATIENT
Start: 2022-04-18 | End: 2022-06-03 | Stop reason: SDUPTHER

## 2022-06-02 NOTE — PROGRESS NOTES
SUBJECTIVE:    Patient ID: Stephanie Knox is a 66 y.o. female.    Chief Complaint: Follow-up (6 weeks, did go to   on 5/7 for URI, brought bottles, wants rx printed to take to [facility], C-scope referral in, DEXA ordered// [facility])    Pt here to checkup on acute and chronic conditions.     S/p right hip replacement ([facility] (from [place] [facility])), 2/22/2022. Has to return for a checkup in a year.  Recently stepped into a hole while cutting grass, and her hip is sore. Does take occasional hyrdrocodone.  Had to take one after otc NSAID did not help. Has been carrying a cane per PT.  It is difficult to walk long distances.       Has been using estradiol cream, still having incontinence at times when getting up to go to the bathroom. Has had a normal PVD.     Anxiety and depression had been doing ok. States it comes and goes.  Has been taking buspar, and tolerating ok.  Continues to take prozac, doesn't think it has helped.  (Has done biofeedback and psychology in the past, and that doesn't seem to help;failed trintellix, abilify.)    Sleeping ok on 2 ambien and doxepin, getting a good 5-6 hours. (failed trazodone, nightmares)    Continues to have chronic neck pain, which she is able to tolerate.     Pt stopped smoking for about 2-3 weeks ago since was sick with an URI. Still having cravings. Quit 30 years ago for 20 years.     Pt notified today the need to change pharmacy due to insurance issues and will be taking rx to [facility].  ---------------------------------------------------------------------  Pt had cscope in 10/2019, had polyps and has to repeat 2-3 years  Mammogram 3/2021, due, already has order.  DEXA ordered.  Eye exam UTD at [facility]      Office Visit on 02/15/2022   Component Date Value Ref Range Status    Color, UA 02/15/2022 YELLOW  YELLOW Final    Appearance, UA 02/15/2022 CLEAR  CLEAR Final    Specific [place], UA 02/15/2022 1.014  1.001 - 1.035 Final    pH, UA 02/15/2022 < OR =  5.0  5.0 - 8.0 Final    Glucose, UA 02/15/2022 NEGATIVE  NEGATIVE Final    Bilirubin, UA 02/15/2022 NEGATIVE  NEGATIVE Final    Ketones, UA 02/15/2022 NEGATIVE  NEGATIVE Final    Occult Blood UA 02/15/2022 NEGATIVE  NEGATIVE Final    Protein, UA 02/15/2022 NEGATIVE  NEGATIVE Final    Nitrite, UA 02/15/2022 NEGATIVE  NEGATIVE Final    Leukocytes, UA 02/15/2022 1+ (A) NEGATIVE Final    Urine Culture, Routine 02/15/2022  (A)  Final       Past Medical History:   Diagnosis Date    ADHD (attention deficit hyperactivity disorder)     Anxiety     Arthritis     Colon polyps     Depression     Hyperlipidemia     Insomnia     Memory difficulties      Social History     Socioeconomic History    Marital status:    Tobacco Use    Smoking status: Current Every Day Smoker     Packs/day: 0.25    Smokeless tobacco: Never Used   Substance and Sexual Activity    Alcohol use: Not Currently    Drug use: Never     Past Surgical History:   Procedure Laterality Date    TONSILLECTOMY       Family History   Problem Relation Age of Onset    Emphysema Mother     Diabetes Father     Breast cancer Maternal Aunt        Review of patient's allergies indicates:  No Known Allergies    Current Outpatient Medications:     atorvastatin (LIPITOR) 20 MG tablet, Take 1 tablet (20 mg total) by mouth once daily., Disp: 90 tablet, Rfl: 1    Lactobacillus rhamnosus GG (CULTURELLE) 10 billion cell capsule, Take 1 capsule by mouth once daily., Disp: , Rfl:     levomefolate-algal oil (DEPLIN, ALGAL OIL,) 7.5-90.314 mg Cap, Take 1 capsule by mouth once daily., Disp: 90 capsule, Rfl: 1    magnesium oxide (MAG-OX) 400 mg (241.3 mg magnesium) tablet, Take 400 mg by mouth once daily., Disp: , Rfl:     phenazopyridine (PYRIDIUM) 200 MG tablet, Take 1 tablet (200 mg total) by mouth 3 (three) times daily as needed for Pain., Disp: 30 tablet, Rfl: 0    ALPRAZolam (XANAX) 1 MG tablet, Take 1 tablet (1 mg total) by mouth nightly as  needed for Anxiety., Disp: 90 tablet, Rfl: 1    busPIRone (BUSPAR) 15 MG tablet, Take 1 tablet (15 mg total) by mouth 2 (two) times daily., Disp: 180 tablet, Rfl: 1    cyclobenzaprine (FLEXERIL) 10 MG tablet, Take 1 tablet (10 mg total) by mouth 3 (three) times daily as needed., Disp: 270 tablet, Rfl: 1    dextroamphetamine-amphetamine (ADDERALL) 20 mg tablet, Take 1 tablet by mouth once daily., Disp: 90 tablet, Rfl: 0    doxepin (SINEQUAN) 100 MG capsule, Take 1 capsule (100 mg total) by mouth every evening., Disp: 90 capsule, Rfl: 1    [START ON 6/6/2022] estradioL (ESTRACE) 0.01 % (0.1 mg/gram) vaginal cream, Place 4 g vaginally twice a week., Disp: 3 each, Rfl: 1    FLUoxetine 40 MG capsule, Take 1 capsule (40 mg total) by mouth once daily., Disp: 30 capsule, Rfl: 1    HYDROcodone-acetaminophen (NORCO)  mg per tablet, Take 1 tablet by mouth every 6 (six) hours as needed for Pain., Disp: 180 tablet, Rfl: 0    nystatin (MYCOSTATIN) cream, Apply topically 2 (two) times daily., Disp: 30 g, Rfl: 1    venlafaxine (EFFEXOR-XR) 75 MG 24 hr capsule, Take 1 capsule (75 mg total) by mouth once daily., Disp: 90 capsule, Rfl: 1    zolpidem (AMBIEN) 10 mg Tab, Take 2 tablets (20 mg total) by mouth every evening., Disp: 180 tablet, Rfl: 1    Review of Systems   Constitutional: Negative for appetite change, fatigue, fever and unexpected weight change.   Respiratory: Negative for cough, chest tightness, shortness of breath and wheezing.    Cardiovascular: Negative for chest pain and leg swelling.   Gastrointestinal: Negative for abdominal pain, constipation, nausea and vomiting.        -heartburn   Genitourinary: Negative for difficulty urinating, dysuria, frequency and urgency.   Musculoskeletal: Positive for arthralgias (right hip) and neck pain. Negative for back pain and myalgias.   Skin: Negative for rash.   Neurological: Positive for numbness. Negative for dizziness, weakness and headaches.   Hematological:  "Does not bruise/bleed easily.   Psychiatric/Behavioral: Positive for dysphoric mood and sleep disturbance. Negative for suicidal ideas. The patient is not nervous/anxious.    All other systems reviewed and are negative.         Objective:      Vitals:    06/03/22 0750   BP: 116/72   Pulse: 80   Weight: 89.4 kg (197 lb)   Height: 5' 6" (1.676 m)     Physical Exam  Vitals reviewed.   Constitutional:       General: She is not in acute distress.     Appearance: Normal appearance. She is well-developed. She is obese.   HENT:      Head: Normocephalic and atraumatic.   Neck:      Thyroid: No thyromegaly.      Vascular: No carotid bruit.   Cardiovascular:      Rate and Rhythm: Normal rate and regular rhythm.      Heart sounds: Normal heart sounds. No murmur heard.    No friction rub.   Pulmonary:      Effort: Pulmonary effort is normal.      Breath sounds: Normal breath sounds. No wheezing or rales.   Abdominal:      General: Bowel sounds are normal. There is no distension.      Palpations: Abdomen is soft.      Tenderness: There is no abdominal tenderness.   Musculoskeletal:      Cervical back: Neck supple.   Lymphadenopathy:      Cervical: No cervical adenopathy.   Skin:     General: Skin is warm and dry.      Findings: No rash.   Neurological:      Mental Status: She is alert and oriented to person, place, and time.   Psychiatric:         Attention and Perception: She is attentive.         Speech: Speech normal.         Behavior: Behavior normal.         Thought Content: Thought content normal.         Judgment: Judgment normal.           Assessment:       1. Arthralgia of right hip    2. Chronic insomnia    3. Chronic recurrent major depressive disorder    4. Panic disorder without agoraphobia    5. Attention deficit hyperactivity disorder (ADHD), predominantly inattentive type    6. Menopause    7. Special screening for osteoporosis    8. Colon cancer screening    9. Medication refill    10. Long-term use of high-risk " medication    11. Screening for ischemic heart disease (IHD)    12. Screening for blood or protein in urine    13. Screening for endocrine disorder         Plan:       Arthralgia of right hip  Comments:  Symptomatic. To continue to use cane as needed to prevent falls. To use prn hydrocodone sparingly.  Orders:  -     HYDROcodone-acetaminophen (NORCO)  mg per tablet; Take 1 tablet by mouth every 6 (six) hours as needed for Pain.  Dispense: 180 tablet; Refill: 0  -     cyclobenzaprine (FLEXERIL) 10 MG tablet; Take 1 tablet (10 mg total) by mouth 3 (three) times daily as needed.  Dispense: 270 tablet; Refill: 1  -     DRUG MONITOR, PANEL 4, W/CONF, URINE    Chronic insomnia  Comments:  Stable. To continue on ambien/doxepin.  Will continue on monitor symptoms.  Orders:  -     doxepin (SINEQUAN) 100 MG capsule; Take 1 capsule (100 mg total) by mouth every evening.  Dispense: 90 capsule; Refill: 1  -     zolpidem (AMBIEN) 10 mg Tab; Take 2 tablets (20 mg total) by mouth every evening.  Dispense: 180 tablet; Refill: 1    Chronic recurrent major depressive disorder  Comments:  Controlled. Will add buspar and reassess  Orders:  -     FLUoxetine 40 MG capsule; Take 1 capsule (40 mg total) by mouth once daily.  Dispense: 30 capsule; Refill: 1    Panic disorder without agoraphobia  Comments:  Symptomatic. On prozac and prn Xanax. Will add buspar. Will continue to monitor for symptoms  Orders:  -     busPIRone (BUSPAR) 15 MG tablet; Take 1 tablet (15 mg total) by mouth 2 (two) times daily.  Dispense: 180 tablet; Refill: 1  -     ALPRAZolam (XANAX) 1 MG tablet; Take 1 tablet (1 mg total) by mouth nightly as needed for Anxiety.  Dispense: 90 tablet; Refill: 1  -     venlafaxine (EFFEXOR-XR) 75 MG 24 hr capsule; Take 1 capsule (75 mg total) by mouth once daily.  Dispense: 90 capsule; Refill: 1  -     DRUG MONITOR, PANEL 4, W/CONF, URINE    Attention deficit hyperactivity disorder (ADHD), predominantly inattentive  type  Comments:  Focus controlled. Will continue to monitor tolerability and effectiveness of Adderall  Orders:  -     dextroamphetamine-amphetamine (ADDERALL) 20 mg tablet; Take 1 tablet by mouth once daily.  Dispense: 90 tablet; Refill: 0    Menopause  Comments:  DEXA order sent to [facility]  Orders:  -     DXA Bone Density Spine And Hip; Future; Expected date: 06/03/2022    Special screening for osteoporosis  -     DXA Bone Density Spine And Hip; Future; Expected date: 06/03/2022    Colon cancer screening  Comments:  Will refer to GI for cscope  Orders:  -     Ambulatory referral/consult to Gastroenterology; Future; Expected date: 06/10/2022    Medication refill  -     estradioL (ESTRACE) 0.01 % (0.1 mg/gram) vaginal cream; Place 4 g vaginally twice a week.  Dispense: 3 each; Refill: 1  -     nystatin (MYCOSTATIN) cream; Apply topically 2 (two) times daily.  Dispense: 30 g; Refill: 1    Long-term use of high-risk medication  -     Comprehensive Metabolic Panel; Future; Expected date: 06/03/2022  -     Lipid Panel; Future; Expected date: 06/03/2022  -     Microalbumin/Creatinine Ratio, Urine; Future; Expected date: 06/03/2022  -     TSH w/reflex to FT4; Future; Expected date: 06/03/2022  -     Urinalysis, Reflex to Urine Culture Urine, Clean Catch; Future; Expected date: 06/03/2022  -     CBC Auto Differential; Future; Expected date: 06/03/2022  -     DRUG MONITOR, PANEL 4, W/CONF, URINE    Screening for ischemic heart disease (IHD)  -     Comprehensive Metabolic Panel; Future; Expected date: 06/03/2022  -     Lipid Panel; Future; Expected date: 06/03/2022    Screening for blood or protein in urine  -     Microalbumin/Creatinine Ratio, Urine; Future; Expected date: 06/03/2022  -     Urinalysis, Reflex to Urine Culture Urine, Clean Catch; Future; Expected date: 06/03/2022    Screening for endocrine disorder  -     TSH w/reflex to FT4; Future; Expected date: 06/03/2022    Labs and/or tests have been ordered for the  evaluation/monitoring of acute/chronic conditions, to be done just before next visit.    Follow up in about 4 months (around 10/3/2022) for Insomnia, Depression, Anxiety.        6/3/2022 [place]

## 2022-06-03 ENCOUNTER — OFFICE VISIT (OUTPATIENT)
Dept: FAMILY MEDICINE | Facility: CLINIC | Age: 67
End: 2022-06-03
Payer: MEDICARE

## 2022-06-03 VITALS
WEIGHT: 197 LBS | HEIGHT: 66 IN | DIASTOLIC BLOOD PRESSURE: 72 MMHG | BODY MASS INDEX: 31.66 KG/M2 | SYSTOLIC BLOOD PRESSURE: 116 MMHG | HEART RATE: 80 BPM

## 2022-06-03 DIAGNOSIS — Z76.0 MEDICATION REFILL: ICD-10-CM

## 2022-06-03 DIAGNOSIS — Z78.0 MENOPAUSE: ICD-10-CM

## 2022-06-03 DIAGNOSIS — F90.0 ATTENTION DEFICIT HYPERACTIVITY DISORDER (ADHD), PREDOMINANTLY INATTENTIVE TYPE: ICD-10-CM

## 2022-06-03 DIAGNOSIS — Z12.11 COLON CANCER SCREENING: ICD-10-CM

## 2022-06-03 DIAGNOSIS — Z13.6 SCREENING FOR ISCHEMIC HEART DISEASE (IHD): ICD-10-CM

## 2022-06-03 DIAGNOSIS — F33.9 CHRONIC RECURRENT MAJOR DEPRESSIVE DISORDER: ICD-10-CM

## 2022-06-03 DIAGNOSIS — F41.0 PANIC DISORDER WITHOUT AGORAPHOBIA: ICD-10-CM

## 2022-06-03 DIAGNOSIS — Z13.820 SPECIAL SCREENING FOR OSTEOPOROSIS: ICD-10-CM

## 2022-06-03 DIAGNOSIS — Z13.89 SCREENING FOR BLOOD OR PROTEIN IN URINE: ICD-10-CM

## 2022-06-03 DIAGNOSIS — F51.04 CHRONIC INSOMNIA: ICD-10-CM

## 2022-06-03 DIAGNOSIS — Z79.899 LONG-TERM USE OF HIGH-RISK MEDICATION: ICD-10-CM

## 2022-06-03 DIAGNOSIS — M25.551 ARTHRALGIA OF RIGHT HIP: Primary | ICD-10-CM

## 2022-06-03 DIAGNOSIS — Z13.29 SCREENING FOR ENDOCRINE DISORDER: ICD-10-CM

## 2022-06-03 PROCEDURE — 1160F RVW MEDS BY RX/DR IN RCRD: CPT | Mod: CPTII,S$GLB,, | Performed by: FAMILY MEDICINE

## 2022-06-03 PROCEDURE — 1159F MED LIST DOCD IN RCRD: CPT | Mod: CPTII,S$GLB,, | Performed by: FAMILY MEDICINE

## 2022-06-03 PROCEDURE — 99214 OFFICE O/P EST MOD 30 MIN: CPT | Mod: S$GLB,,, | Performed by: FAMILY MEDICINE

## 2022-06-03 PROCEDURE — 99214 PR OFFICE/OUTPT VISIT, EST, LEVL IV, 30-39 MIN: ICD-10-PCS | Mod: S$GLB,,, | Performed by: FAMILY MEDICINE

## 2022-06-03 PROCEDURE — 3008F PR BODY MASS INDEX (BMI) DOCUMENTED: ICD-10-PCS | Mod: CPTII,S$GLB,, | Performed by: FAMILY MEDICINE

## 2022-06-03 PROCEDURE — 1160F PR REVIEW ALL MEDS BY PRESCRIBER/CLIN PHARMACIST DOCUMENTED: ICD-10-PCS | Mod: CPTII,S$GLB,, | Performed by: FAMILY MEDICINE

## 2022-06-03 PROCEDURE — 3008F BODY MASS INDEX DOCD: CPT | Mod: CPTII,S$GLB,, | Performed by: FAMILY MEDICINE

## 2022-06-03 PROCEDURE — 3078F DIAST BP <80 MM HG: CPT | Mod: CPTII,S$GLB,, | Performed by: FAMILY MEDICINE

## 2022-06-03 PROCEDURE — 3078F PR MOST RECENT DIASTOLIC BLOOD PRESSURE < 80 MM HG: ICD-10-PCS | Mod: CPTII,S$GLB,, | Performed by: FAMILY MEDICINE

## 2022-06-03 PROCEDURE — 3074F PR MOST RECENT SYSTOLIC BLOOD PRESSURE < 130 MM HG: ICD-10-PCS | Mod: CPTII,S$GLB,, | Performed by: FAMILY MEDICINE

## 2022-06-03 PROCEDURE — 1159F PR MEDICATION LIST DOCUMENTED IN MEDICAL RECORD: ICD-10-PCS | Mod: CPTII,S$GLB,, | Performed by: FAMILY MEDICINE

## 2022-06-03 PROCEDURE — 3074F SYST BP LT 130 MM HG: CPT | Mod: CPTII,S$GLB,, | Performed by: FAMILY MEDICINE

## 2022-06-03 RX ORDER — DEXTROAMPHETAMINE SACCHARATE, AMPHETAMINE ASPARTATE, DEXTROAMPHETAMINE SULFATE AND AMPHETAMINE SULFATE 5; 5; 5; 5 MG/1; MG/1; MG/1; MG/1
1 TABLET ORAL DAILY
Qty: 90 TABLET | Refills: 0 | Status: SHIPPED | OUTPATIENT
Start: 2022-06-03 | End: 2023-02-06 | Stop reason: SDUPTHER

## 2022-06-03 RX ORDER — VENLAFAXINE HYDROCHLORIDE 75 MG/1
75 CAPSULE, EXTENDED RELEASE ORAL DAILY
Qty: 90 CAPSULE | Refills: 1 | Status: SHIPPED | OUTPATIENT
Start: 2022-06-03 | End: 2022-10-03 | Stop reason: SDUPTHER

## 2022-06-03 RX ORDER — ESTRADIOL 0.1 MG/G
4 CREAM VAGINAL
Qty: 3 EACH | Refills: 1 | Status: SHIPPED | OUTPATIENT
Start: 2022-06-06 | End: 2022-10-03 | Stop reason: SDUPTHER

## 2022-06-03 RX ORDER — DOXEPIN HYDROCHLORIDE 100 MG/1
100 CAPSULE ORAL NIGHTLY
Qty: 90 CAPSULE | Refills: 1 | Status: SHIPPED | OUTPATIENT
Start: 2022-06-03 | End: 2023-02-06 | Stop reason: SDUPTHER

## 2022-06-03 RX ORDER — FLUOXETINE HYDROCHLORIDE 40 MG/1
40 CAPSULE ORAL DAILY
Qty: 30 CAPSULE | Refills: 1 | Status: SHIPPED | OUTPATIENT
Start: 2022-06-03 | End: 2022-10-03 | Stop reason: SDUPTHER

## 2022-06-03 RX ORDER — HYDROCODONE BITARTRATE AND ACETAMINOPHEN 10; 325 MG/1; MG/1
1 TABLET ORAL EVERY 6 HOURS PRN
Qty: 180 TABLET | Refills: 0 | Status: SHIPPED | OUTPATIENT
Start: 2022-06-03 | End: 2022-11-30

## 2022-06-03 RX ORDER — NYSTATIN 100000 U/G
CREAM TOPICAL 2 TIMES DAILY
Qty: 30 G | Refills: 1 | Status: SHIPPED | OUTPATIENT
Start: 2022-06-03 | End: 2022-10-03 | Stop reason: SDUPTHER

## 2022-06-03 RX ORDER — CYCLOBENZAPRINE HCL 10 MG
10 TABLET ORAL 3 TIMES DAILY PRN
Qty: 270 TABLET | Refills: 1 | Status: SHIPPED | OUTPATIENT
Start: 2022-06-03 | End: 2022-10-03 | Stop reason: SDUPTHER

## 2022-06-03 RX ORDER — BUSPIRONE HYDROCHLORIDE 15 MG/1
15 TABLET ORAL 2 TIMES DAILY
Qty: 180 TABLET | Refills: 1 | Status: SHIPPED | OUTPATIENT
Start: 2022-06-03 | End: 2023-01-09 | Stop reason: SDUPTHER

## 2022-06-03 RX ORDER — ZOLPIDEM TARTRATE 10 MG/1
20 TABLET ORAL NIGHTLY
Qty: 180 TABLET | Refills: 1 | Status: SHIPPED | OUTPATIENT
Start: 2022-06-03 | End: 2022-10-03 | Stop reason: SDUPTHER

## 2022-06-03 RX ORDER — ALPRAZOLAM 1 MG/1
1 TABLET ORAL NIGHTLY PRN
Qty: 90 TABLET | Refills: 1 | Status: SHIPPED | OUTPATIENT
Start: 2022-06-03 | End: 2022-10-03 | Stop reason: SDUPTHER

## 2022-06-06 ENCOUNTER — TELEPHONE (OUTPATIENT)
Dept: FAMILY MEDICINE | Facility: CLINIC | Age: 67
End: 2022-06-06

## 2022-06-06 NOTE — TELEPHONE ENCOUNTER
----- Message from Eliane Hu sent at 6/6/2022  8:18 AM CDT -----  Dorado with [facility] called and she has a question about a patient prescription hydrocodone-acetaminophen please  give her a call at [phone]

## 2022-06-06 NOTE — TELEPHONE ENCOUNTER
Spoke to pharmacist. She said the most they can prescribe is a 30 day supply of the Norco. So that would be #120 not 180. Advised okay to dispense.

## 2022-06-15 ENCOUNTER — TELEPHONE (OUTPATIENT)
Dept: FAMILY MEDICINE | Facility: CLINIC | Age: 67
End: 2022-06-15

## 2022-06-15 NOTE — TELEPHONE ENCOUNTER
----- Message from Duyen Lewis sent at 6/15/2022 10:52 AM CDT -----  Regarding: PATIENT ORDERS    TEST ORDERED:  DXA Bone Density Spine And Hip      STATUS:    Please, be advised that several attempts  to schedule this test with patient were                       made to no avail.  We are removing this order from our workqueue.                      The test order will remain in the patient's chart.                       Thank you!                        Duyen

## 2022-09-20 ENCOUNTER — TELEPHONE (OUTPATIENT)
Dept: FAMILY MEDICINE | Facility: CLINIC | Age: 67
End: 2022-09-20

## 2022-09-25 LAB
ALBUMIN SERPL-MCNC: 4.1 G/DL (ref 3.6–5.1)
ALBUMIN/CREAT UR: 8 MCG/MG CREAT
ALBUMIN/GLOB SERPL: 1.5 (CALC) (ref 1–2.5)
ALP SERPL-CCNC: 137 U/L (ref 37–153)
ALT SERPL-CCNC: 45 U/L (ref 6–29)
APPEARANCE UR: CLEAR
AST SERPL-CCNC: 53 U/L (ref 10–35)
BACTERIA #/AREA URNS HPF: ABNORMAL /HPF
BACTERIA UR CULT: ABNORMAL
BACTERIA UR CULT: ABNORMAL
BASOPHILS # BLD AUTO: 108 CELLS/UL (ref 0–200)
BASOPHILS NFR BLD AUTO: 1.4 %
BILIRUB SERPL-MCNC: 0.4 MG/DL (ref 0.2–1.2)
BILIRUB UR QL STRIP: NEGATIVE
BUN SERPL-MCNC: 11 MG/DL (ref 7–25)
BUN/CREAT SERPL: ABNORMAL (CALC) (ref 6–22)
CALCIUM SERPL-MCNC: 9.2 MG/DL (ref 8.6–10.4)
CHLORIDE SERPL-SCNC: 105 MMOL/L (ref 98–110)
CHOLEST SERPL-MCNC: 270 MG/DL
CHOLEST/HDLC SERPL: 6.4 (CALC)
CO2 SERPL-SCNC: 23 MMOL/L (ref 20–32)
COLOR UR: YELLOW
CREAT SERPL-MCNC: 1.03 MG/DL (ref 0.5–1.05)
CREAT UR-MCNC: 106 MG/DL (ref 20–275)
EGFR: 60 ML/MIN/1.73M2
EOSINOPHIL # BLD AUTO: 323 CELLS/UL (ref 15–500)
EOSINOPHIL NFR BLD AUTO: 4.2 %
ERYTHROCYTE [DISTWIDTH] IN BLOOD BY AUTOMATED COUNT: 16.7 % (ref 11–15)
GLOBULIN SER CALC-MCNC: 2.8 G/DL (CALC) (ref 1.9–3.7)
GLUCOSE SERPL-MCNC: 100 MG/DL (ref 65–99)
GLUCOSE UR QL STRIP: NEGATIVE
HCT VFR BLD AUTO: 43.3 % (ref 35–45)
HDLC SERPL-MCNC: 42 MG/DL
HGB BLD-MCNC: 13.5 G/DL (ref 11.7–15.5)
HGB UR QL STRIP: NEGATIVE
HYALINE CASTS #/AREA URNS LPF: ABNORMAL /LPF
KETONES UR QL STRIP: ABNORMAL
LDLC SERPL CALC-MCNC: ABNORMAL MG/DL (CALC)
LEUKOCYTE ESTERASE UR QL STRIP: ABNORMAL
LYMPHOCYTES # BLD AUTO: 2287 CELLS/UL (ref 850–3900)
LYMPHOCYTES NFR BLD AUTO: 29.7 %
MCH RBC QN AUTO: 27.6 PG (ref 27–33)
MCHC RBC AUTO-ENTMCNC: 31.2 G/DL (ref 32–36)
MCV RBC AUTO: 88.5 FL (ref 80–100)
MICROALBUMIN UR-MCNC: 0.8 MG/DL
MONOCYTES # BLD AUTO: 701 CELLS/UL (ref 200–950)
MONOCYTES NFR BLD AUTO: 9.1 %
NEUTROPHILS # BLD AUTO: 4281 CELLS/UL (ref 1500–7800)
NEUTROPHILS NFR BLD AUTO: 55.6 %
NITRITE UR QL STRIP: NEGATIVE
NONHDLC SERPL-MCNC: 228 MG/DL (CALC)
PH UR STRIP: 5.5 [PH] (ref 5–8)
PLATELET # BLD AUTO: 273 THOUSAND/UL (ref 140–400)
PMV BLD REES-ECKER: 9.6 FL (ref 7.5–12.5)
POTASSIUM SERPL-SCNC: 4.5 MMOL/L (ref 3.5–5.3)
PROT SERPL-MCNC: 6.9 G/DL (ref 6.1–8.1)
PROT UR QL STRIP: NEGATIVE
RBC # BLD AUTO: 4.89 MILLION/UL (ref 3.8–5.1)
RBC #/AREA URNS HPF: ABNORMAL /HPF
SERVICE CMNT-IMP: ABNORMAL
SODIUM SERPL-SCNC: 137 MMOL/L (ref 135–146)
SP GR UR STRIP: 1.01 (ref 1–1.03)
SQUAMOUS #/AREA URNS HPF: ABNORMAL /HPF
TRIGL SERPL-MCNC: 461 MG/DL
TSH SERPL-ACNC: 1.1 MIU/L (ref 0.4–4.5)
WBC # BLD AUTO: 7.7 THOUSAND/UL (ref 3.8–10.8)
WBC #/AREA URNS HPF: ABNORMAL /HPF

## 2022-09-26 ENCOUNTER — TELEPHONE (OUTPATIENT)
Dept: FAMILY MEDICINE | Facility: CLINIC | Age: 67
End: 2022-09-26

## 2022-09-26 DIAGNOSIS — N30.00 ACUTE CYSTITIS WITHOUT HEMATURIA: Primary | ICD-10-CM

## 2022-09-26 RX ORDER — CEFUROXIME AXETIL 500 MG/1
500 TABLET ORAL 2 TIMES DAILY
Qty: 10 TABLET | Refills: 0 | Status: SHIPPED | OUTPATIENT
Start: 2022-09-26 | End: 2022-10-03

## 2022-09-26 NOTE — TELEPHONE ENCOUNTER
Spoke to patient. She is having some pain/burning. Advised we will call in an antibiotic for her. [facility].

## 2022-10-03 ENCOUNTER — OFFICE VISIT (OUTPATIENT)
Dept: FAMILY MEDICINE | Facility: CLINIC | Age: 67
End: 2022-10-03
Payer: MEDICARE

## 2022-10-03 VITALS — HEIGHT: 66 IN | BODY MASS INDEX: 32.14 KG/M2 | HEART RATE: 84 BPM | WEIGHT: 200 LBS

## 2022-10-03 DIAGNOSIS — M25.551 ARTHRALGIA OF RIGHT HIP: ICD-10-CM

## 2022-10-03 DIAGNOSIS — Z76.0 MEDICATION REFILL: ICD-10-CM

## 2022-10-03 DIAGNOSIS — E78.2 MIXED HYPERLIPIDEMIA: ICD-10-CM

## 2022-10-03 DIAGNOSIS — Z87.891 FORMER SMOKER: ICD-10-CM

## 2022-10-03 DIAGNOSIS — F51.04 CHRONIC INSOMNIA: ICD-10-CM

## 2022-10-03 DIAGNOSIS — Z23 PNEUMOCOCCAL VACCINATION ADMINISTERED AT CURRENT VISIT: ICD-10-CM

## 2022-10-03 DIAGNOSIS — F41.0 PANIC DISORDER WITHOUT AGORAPHOBIA: ICD-10-CM

## 2022-10-03 DIAGNOSIS — F33.9 CHRONIC RECURRENT MAJOR DEPRESSIVE DISORDER: ICD-10-CM

## 2022-10-03 DIAGNOSIS — F90.0 ATTENTION DEFICIT HYPERACTIVITY DISORDER (ADHD), PREDOMINANTLY INATTENTIVE TYPE: Primary | ICD-10-CM

## 2022-10-03 DIAGNOSIS — Z23 NEED FOR TDAP VACCINATION: ICD-10-CM

## 2022-10-03 DIAGNOSIS — Z79.899 LONG-TERM USE OF HIGH-RISK MEDICATION: ICD-10-CM

## 2022-10-03 PROCEDURE — 1101F PT FALLS ASSESS-DOCD LE1/YR: CPT | Mod: CPTII,S$GLB,, | Performed by: FAMILY MEDICINE

## 2022-10-03 PROCEDURE — 3008F PR BODY MASS INDEX (BMI) DOCUMENTED: ICD-10-PCS | Mod: CPTII,S$GLB,, | Performed by: FAMILY MEDICINE

## 2022-10-03 PROCEDURE — G0009 PNEUMOCOCCAL CONJUGATE VACCINE 20-VALENT: ICD-10-PCS | Mod: S$GLB,,, | Performed by: FAMILY MEDICINE

## 2022-10-03 PROCEDURE — 90677 PNEUMOCOCCAL CONJUGATE VACCINE 20-VALENT: ICD-10-PCS | Mod: S$GLB,,, | Performed by: FAMILY MEDICINE

## 2022-10-03 PROCEDURE — 99214 PR OFFICE/OUTPT VISIT, EST, LEVL IV, 30-39 MIN: ICD-10-PCS | Mod: 25,S$GLB,, | Performed by: FAMILY MEDICINE

## 2022-10-03 PROCEDURE — 1159F MED LIST DOCD IN RCRD: CPT | Mod: CPTII,S$GLB,, | Performed by: FAMILY MEDICINE

## 2022-10-03 PROCEDURE — 99214 OFFICE O/P EST MOD 30 MIN: CPT | Mod: 25,S$GLB,, | Performed by: FAMILY MEDICINE

## 2022-10-03 PROCEDURE — 1159F PR MEDICATION LIST DOCUMENTED IN MEDICAL RECORD: ICD-10-PCS | Mod: CPTII,S$GLB,, | Performed by: FAMILY MEDICINE

## 2022-10-03 PROCEDURE — 1160F RVW MEDS BY RX/DR IN RCRD: CPT | Mod: CPTII,S$GLB,, | Performed by: FAMILY MEDICINE

## 2022-10-03 PROCEDURE — 90677 PCV20 VACCINE IM: CPT | Mod: S$GLB,,, | Performed by: FAMILY MEDICINE

## 2022-10-03 PROCEDURE — 3288F PR FALLS RISK ASSESSMENT DOCUMENTED: ICD-10-PCS | Mod: CPTII,S$GLB,, | Performed by: FAMILY MEDICINE

## 2022-10-03 PROCEDURE — 3008F BODY MASS INDEX DOCD: CPT | Mod: CPTII,S$GLB,, | Performed by: FAMILY MEDICINE

## 2022-10-03 PROCEDURE — 1160F PR REVIEW ALL MEDS BY PRESCRIBER/CLIN PHARMACIST DOCUMENTED: ICD-10-PCS | Mod: CPTII,S$GLB,, | Performed by: FAMILY MEDICINE

## 2022-10-03 PROCEDURE — 3288F FALL RISK ASSESSMENT DOCD: CPT | Mod: CPTII,S$GLB,, | Performed by: FAMILY MEDICINE

## 2022-10-03 PROCEDURE — G0009 ADMIN PNEUMOCOCCAL VACCINE: HCPCS | Mod: S$GLB,,, | Performed by: FAMILY MEDICINE

## 2022-10-03 PROCEDURE — 1101F PR PT FALLS ASSESS DOC 0-1 FALLS W/OUT INJ PAST YR: ICD-10-PCS | Mod: CPTII,S$GLB,, | Performed by: FAMILY MEDICINE

## 2022-10-03 RX ORDER — ESTRADIOL 0.1 MG/G
4 CREAM VAGINAL
Qty: 3 EACH | Refills: 1 | Status: SHIPPED | OUTPATIENT
Start: 2022-10-03 | End: 2023-02-06 | Stop reason: SDUPTHER

## 2022-10-03 RX ORDER — VENLAFAXINE HYDROCHLORIDE 75 MG/1
75 CAPSULE, EXTENDED RELEASE ORAL DAILY
Qty: 90 CAPSULE | Refills: 1 | Status: SHIPPED | OUTPATIENT
Start: 2022-10-03 | End: 2023-02-06 | Stop reason: SDUPTHER

## 2022-10-03 RX ORDER — ATORVASTATIN CALCIUM 20 MG/1
20 TABLET, FILM COATED ORAL DAILY
Qty: 90 TABLET | Refills: 1 | Status: SHIPPED | OUTPATIENT
Start: 2022-10-03 | End: 2023-02-06 | Stop reason: SDUPTHER

## 2022-10-03 RX ORDER — CYCLOBENZAPRINE HCL 10 MG
10 TABLET ORAL 3 TIMES DAILY PRN
Qty: 270 TABLET | Refills: 1 | Status: SHIPPED | OUTPATIENT
Start: 2022-10-03 | End: 2023-02-06 | Stop reason: SDUPTHER

## 2022-10-03 RX ORDER — ZOLPIDEM TARTRATE 10 MG/1
10 TABLET ORAL NIGHTLY
Qty: 90 TABLET | Refills: 1 | Status: SHIPPED | OUTPATIENT
Start: 2022-10-03 | End: 2023-02-06 | Stop reason: SDUPTHER

## 2022-10-03 RX ORDER — ALPRAZOLAM 1 MG/1
1 TABLET ORAL NIGHTLY PRN
Qty: 90 TABLET | Refills: 1 | Status: SHIPPED | OUTPATIENT
Start: 2022-10-03 | End: 2023-02-06 | Stop reason: SDUPTHER

## 2022-10-03 RX ORDER — NYSTATIN 100000 U/G
CREAM TOPICAL 2 TIMES DAILY
Qty: 30 G | Refills: 1 | Status: SHIPPED | OUTPATIENT
Start: 2022-10-03 | End: 2023-02-06 | Stop reason: SDUPTHER

## 2022-10-03 RX ORDER — FLUOXETINE HYDROCHLORIDE 40 MG/1
40 CAPSULE ORAL DAILY
Qty: 90 CAPSULE | Refills: 1 | Status: SHIPPED | OUTPATIENT
Start: 2022-10-03 | End: 2023-02-06 | Stop reason: SDUPTHER

## 2022-10-03 NOTE — PROGRESS NOTES
SUBJECTIVE:    Patient ID: Stephanie Knox is a 67 y.o. female.    Chief Complaint: Hyperlipidemia (Did not bring bottles (brought list)//needs refills//declines PNA//needs UDS today//bs)    Pt here to checkup on acute and chronic conditions.     S/p right hip replacement ([facility] (from [place] via [facility])), 2/22/2022. Had a bad fall a week or so again.  Tripped over a stepoff at the [employer]. Realized that she is not picking up her feet enough. Wears tennis. Did not hurt herself.  Fell forward and toward the right side. Not having any pain.  Has to return for a checkup in a year.  Does take occasional hydrocodone.  Still has leftover pills from last refill. Has chronic pain from across the back.       Anxiety and depression has been a bit high.  Has had a friend a pass. And has been helping some other friends in need.  Has been taking buspar and prozac. Has been doing better and not having crying spurts since she has been taking effexor.  (Has done biofeedback and psychology in the past, and that doesn't seem to help;failed trintellix, abilify.)    Sleeping ok on 1 ambien and 1 doxepin, getting a good 5-6 hours.  (failed trazodone, nightmares)    Continues to have chronic neck pain, which she is able to tolerate.     Remains smoke free.     Pt notified today the need to change pharmacy due to insurance issues and will be taking rx to [facility].    Had labs done: fBS 100, GFR 60, AST/ALT 53/45, , TSH 1.10, Hct 43.3  Ran out of her lipitor since her last visit.  ---------------------------------------------------------------------  Pt had cscope in 10/2019, had polyps and has to repeat 2-3 years.   Mammogram 3/2021, due, already has order.  DEXA ordered.  Eye exam UTD at [facility]  Normal PVD screen.    Son is having a baby, and would like DTap      Office Visit on 06/03/2022   Component Date Value Ref Range Status    Glucose 09/22/2022 100 (H)  65 - 99 mg/dL Final    BUN 09/22/2022 11  7  - 25 mg/dL Final    Creatinine 09/22/2022 1.03  0.50 - 1.05 mg/dL Final    EGFR 09/22/2022 60  > OR = 60 mL/min/1.73m2 Final    BUN/Creatinine Ratio 09/22/2022 NOT APPLICABLE  6 - 22 (calc) Final    Sodium 09/22/2022 137  135 - 146 mmol/L Final    Potassium 09/22/2022 4.5  3.5 - 5.3 mmol/L Final    Chloride 09/22/2022 105  98 - 110 mmol/L Final    CO2 09/22/2022 23  20 - 32 mmol/L Final    Calcium 09/22/2022 9.2  8.6 - 10.4 mg/dL Final    Total Protein 09/22/2022 6.9  6.1 - 8.1 g/dL Final    Albumin 09/22/2022 4.1  3.6 - 5.1 g/dL Final    Globulin, Total 09/22/2022 2.8  1.9 - 3.7 g/dL (calc) Final    Albumin/Globulin Ratio 09/22/2022 1.5  1.0 - 2.5 (calc) Final    Total Bilirubin 09/22/2022 0.4  0.2 - 1.2 mg/dL Final    Alkaline Phosphatase 09/22/2022 137  37 - 153 U/L Final    AST 09/22/2022 53 (H)  10 - 35 U/L Final    ALT 09/22/2022 45 (H)  6 - 29 U/L Final    Cholesterol 09/22/2022 270 (H)  <200 mg/dL Final    HDL 09/22/2022 42 (L)  > OR = 50 mg/dL Final    Triglycerides 09/22/2022 461 (H)  <150 mg/dL Final    LDL Cholesterol 09/22/2022   mg/dL (calc) Final    HDL/Cholesterol Ratio 09/22/2022 6.4 (H)  <5.0 (calc) Final    Non HDL Chol. (LDL+VLDL) 09/22/2022 228 (H)  <130 mg/dL (calc) Final    Creatinine, Urine 09/22/2022 106  20 - 275 mg/dL Final    Microalb, Ur 09/22/2022 0.8  See Note: mg/dL Final    Microalb/Creat Ratio 09/22/2022 8  <30 mcg/mg creat Final    TSH w/reflex to FT4 09/22/2022 1.10  0.40 - 4.50 mIU/L Final    Color, UA 09/22/2022 YELLOW  YELLOW Final    Appearance, UA 09/22/2022 CLEAR  CLEAR Final    Specific Gravity, UA 09/22/2022 1.015  1.001 - 1.035 Final    pH, UA 09/22/2022 5.5  5.0 - 8.0 Final    Glucose, UA 09/22/2022 NEGATIVE  NEGATIVE Final    Bilirubin, UA 09/22/2022 NEGATIVE  NEGATIVE Final    Ketones, UA 09/22/2022 TRACE (A)  NEGATIVE Final    Occult Blood UA 09/22/2022 NEGATIVE  NEGATIVE Final    Protein, UA 09/22/2022 NEGATIVE  NEGATIVE Final    Nitrite, UA 09/22/2022 NEGATIVE   NEGATIVE Final    Leukocytes, UA 09/22/2022 3+ (A)  NEGATIVE Final    WBC Casts, UA 09/22/2022 10-20 (A)  < OR = 5 /HPF Final    RBC Casts, UA 09/22/2022 NONE SEEN  < OR = 2 /HPF Final    Squam Epithel, UA 09/22/2022 0-5  < OR = 5 /HPF Final    Bacteria, UA 09/22/2022 MANY (A)  NONE SEEN /HPF Final    Hyaline Casts, UA 09/22/2022 NONE SEEN  NONE SEEN /LPF Final    Service Cmt: 09/22/2022    Final    Reflexive Urine Culture 09/22/2022    Final    Urine Culture, Routine 09/22/2022  (A)   Final    WBC 09/22/2022 7.7  3.8 - 10.8 Thousand/uL Final    RBC 09/22/2022 4.89  3.80 - 5.10 Million/uL Final    Hemoglobin 09/22/2022 13.5  11.7 - 15.5 g/dL Final    Hematocrit 09/22/2022 43.3  35.0 - 45.0 % Final    MCV 09/22/2022 88.5  80.0 - 100.0 fL Final    MCH 09/22/2022 27.6  27.0 - 33.0 pg Final    MCHC 09/22/2022 31.2 (L)  32.0 - 36.0 g/dL Final    RDW 09/22/2022 16.7 (H)  11.0 - 15.0 % Final    Platelets 09/22/2022 273  140 - 400 Thousand/uL Final    MPV 09/22/2022 9.6  7.5 - 12.5 fL Final    Neutrophils, Abs 09/22/2022 4,281  1,500 - 7,800 cells/uL Final    Lymph # 09/22/2022 2,287  850 - 3,900 cells/uL Final    Mono # 09/22/2022 701  200 - 950 cells/uL Final    Eos # 09/22/2022 323  15 - 500 cells/uL Final    Baso # 09/22/2022 108  0 - 200 cells/uL Final    Neutrophils Relative 09/22/2022 55.6  % Final    Lymph % 09/22/2022 29.7  % Final    Mono % 09/22/2022 9.1  % Final    Eosinophil % 09/22/2022 4.2  % Final    Basophil % 09/22/2022 1.4  % Final       Past Medical History:   Diagnosis Date    ADHD (attention deficit hyperactivity disorder)     Anxiety     Arthritis     Colon polyps     Depression     Hyperlipidemia     Insomnia     Memory difficulties      Social History     Socioeconomic History    Marital status:    Tobacco Use    Smoking status: Former     Packs/day: 0.25     Types: Cigarettes    Smokeless tobacco: Never   Substance and Sexual Activity    Alcohol use: Not Currently    Drug use: Never     Past  Surgical History:   Procedure Laterality Date    TONSILLECTOMY       Family History   Problem Relation Age of Onset    Emphysema Mother     Diabetes Father     Breast cancer Maternal Aunt        Review of patient's allergies indicates:  No Known Allergies    Current Outpatient Medications:     busPIRone (BUSPAR) 15 MG tablet, Take 1 tablet (15 mg total) by mouth 2 (two) times daily., Disp: 180 tablet, Rfl: 1    dextroamphetamine-amphetamine (ADDERALL) 20 mg tablet, Take 1 tablet by mouth once daily., Disp: 90 tablet, Rfl: 0    doxepin (SINEQUAN) 100 MG capsule, Take 1 capsule (100 mg total) by mouth every evening., Disp: 90 capsule, Rfl: 1    HYDROcodone-acetaminophen (NORCO)  mg per tablet, Take 1 tablet by mouth every 6 (six) hours as needed for Pain., Disp: 180 tablet, Rfl: 0    Lactobacillus rhamnosus GG (CULTURELLE) 10 billion cell capsule, Take 1 capsule by mouth once daily., Disp: , Rfl:     levomefolate-algal oil (DEPLIN, ALGAL OIL,) 7.5-90.314 mg Cap, Take 1 capsule by mouth once daily., Disp: 90 capsule, Rfl: 1    magnesium oxide (MAG-OX) 400 mg (241.3 mg magnesium) tablet, Take 400 mg by mouth once daily., Disp: , Rfl:     phenazopyridine (PYRIDIUM) 200 MG tablet, Take 1 tablet (200 mg total) by mouth 3 (three) times daily as needed for Pain., Disp: 30 tablet, Rfl: 0    ALPRAZolam (XANAX) 1 MG tablet, Take 1 tablet (1 mg total) by mouth nightly as needed for Anxiety., Disp: 90 tablet, Rfl: 1    atorvastatin (LIPITOR) 20 MG tablet, Take 1 tablet (20 mg total) by mouth once daily., Disp: 90 tablet, Rfl: 1    cyclobenzaprine (FLEXERIL) 10 MG tablet, Take 1 tablet (10 mg total) by mouth 3 (three) times daily as needed., Disp: 270 tablet, Rfl: 1    estradioL (ESTRACE) 0.01 % (0.1 mg/gram) vaginal cream, Place 4 g vaginally twice a week., Disp: 3 each, Rfl: 1    FLUoxetine 40 MG capsule, Take 1 capsule (40 mg total) by mouth once daily., Disp: 90 capsule, Rfl: 1    nystatin (MYCOSTATIN) cream, Apply  "topically 2 (two) times daily., Disp: 30 g, Rfl: 1    venlafaxine (EFFEXOR-XR) 75 MG 24 hr capsule, Take 1 capsule (75 mg total) by mouth once daily., Disp: 90 capsule, Rfl: 1    zolpidem (AMBIEN) 10 mg Tab, Take 1 tablet (10 mg total) by mouth every evening., Disp: 90 tablet, Rfl: 1    Review of Systems   Constitutional:  Negative for appetite change, fatigue, fever and unexpected weight change.   Respiratory:  Negative for cough, chest tightness, shortness of breath and wheezing.    Cardiovascular:  Negative for chest pain and leg swelling.   Gastrointestinal:  Negative for abdominal pain, constipation, nausea and vomiting.        -heartburn   Genitourinary:  Negative for difficulty urinating, dysuria, frequency and urgency.   Musculoskeletal:  Positive for arthralgias (right hip) and neck pain. Negative for back pain and myalgias.   Skin:  Negative for rash.   Neurological:  Positive for numbness. Negative for dizziness, weakness and headaches.   Hematological:  Does not bruise/bleed easily.   Psychiatric/Behavioral:  Positive for dysphoric mood and sleep disturbance. Negative for suicidal ideas. The patient is not nervous/anxious.    All other systems reviewed and are negative.       Objective:      Vitals:    10/03/22 1029   Pulse: 84   Weight: 90.7 kg (200 lb)   Height: 5' 6" (1.676 m)     Wt Readings from Last 3 Encounters:   10/03/22 90.7 kg (200 lb)   06/03/22 89.4 kg (197 lb)   04/18/22 89.8 kg (198 lb)       Physical Exam  Vitals reviewed.   Constitutional:       General: She is not in acute distress.     Appearance: Normal appearance. She is well-developed. She is obese.   HENT:      Head: Normocephalic and atraumatic.   Neck:      Thyroid: No thyromegaly.      Vascular: No carotid bruit.   Cardiovascular:      Rate and Rhythm: Normal rate and regular rhythm.      Heart sounds: Normal heart sounds. No murmur heard.    No friction rub.   Pulmonary:      Effort: Pulmonary effort is normal.      Breath " sounds: Normal breath sounds. No wheezing or rales.   Abdominal:      General: Bowel sounds are normal. There is no distension.      Palpations: Abdomen is soft.      Tenderness: There is no abdominal tenderness.   Musculoskeletal:      Cervical back: Neck supple.   Lymphadenopathy:      Cervical: No cervical adenopathy.   Skin:     General: Skin is warm and dry.      Findings: No rash.   Neurological:      Mental Status: She is alert and oriented to person, place, and time.   Psychiatric:         Attention and Perception: She is attentive.         Speech: Speech normal.         Behavior: Behavior normal.         Thought Content: Thought content normal.         Judgment: Judgment normal.         Assessment:       1. Attention deficit hyperactivity disorder (ADHD), predominantly inattentive type    2. Chronic insomnia    3. Arthralgia of right hip    4. Panic disorder without agoraphobia    5. Chronic recurrent major depressive disorder    6. Former smoker    7. Need for Tdap vaccination    8. Mixed hyperlipidemia    9. Medication refill    10. Long-term use of high-risk medication    11. Pneumococcal vaccination administered at current visit         Plan:       Attention deficit hyperactivity disorder (ADHD), predominantly inattentive type  Comments:  Focus controlled. Will continue to monitor tolerability and effectiveness of Adderall    Chronic insomnia  Comments:  Stable. To continue on ambien/doxepin.  Will continue on monitor symptoms.  Orders:  -     zolpidem (AMBIEN) 10 mg Tab; Take 1 tablet (10 mg total) by mouth every evening.  Dispense: 90 tablet; Refill: 1    Arthralgia of right hip  Comments:  Symptomatic. To continue to use cane as needed to prevent falls. To use prn hydrocodone sparingly.  Orders:  -     cyclobenzaprine (FLEXERIL) 10 MG tablet; Take 1 tablet (10 mg total) by mouth 3 (three) times daily as needed.  Dispense: 270 tablet; Refill: 1    Panic disorder without  agoraphobia  Comments:  Improved. To continue prozac/buspar/effexor. Will continue to monitor for symptoms  Orders:  -     ALPRAZolam (XANAX) 1 MG tablet; Take 1 tablet (1 mg total) by mouth nightly as needed for Anxiety.  Dispense: 90 tablet; Refill: 1  -     venlafaxine (EFFEXOR-XR) 75 MG 24 hr capsule; Take 1 capsule (75 mg total) by mouth once daily.  Dispense: 90 capsule; Refill: 1    Chronic recurrent major depressive disorder  Comments:  Improved. Will continue to monitor symptoms on current mediation, prozac/effexor/buspar  Orders:  -     FLUoxetine 40 MG capsule; Take 1 capsule (40 mg total) by mouth once daily.  Dispense: 90 capsule; Refill: 1    Former smoker  Comments:  Pt enouraged to remain smoke free    Need for Tdap vaccination  Comments:  Has a grandchild coming up.    Mixed hyperlipidemia  Comments:  Suboptimal. LDL >400. To resume lipitor  Orders:  -     Comprehensive Metabolic Panel; Future; Expected date: 10/03/2022  -     Lipid Panel; Future; Expected date: 10/03/2022    Medication refill  -     atorvastatin (LIPITOR) 20 MG tablet; Take 1 tablet (20 mg total) by mouth once daily.  Dispense: 90 tablet; Refill: 1  -     nystatin (MYCOSTATIN) cream; Apply topically 2 (two) times daily.  Dispense: 30 g; Refill: 1  -     estradioL (ESTRACE) 0.01 % (0.1 mg/gram) vaginal cream; Place 4 g vaginally twice a week.  Dispense: 3 each; Refill: 1    Long-term use of high-risk medication  -     Comprehensive Metabolic Panel; Future; Expected date: 10/03/2022  -     Lipid Panel; Future; Expected date: 10/03/2022    Pneumococcal vaccination administered at current visit  -     (In Office Administered) Pneumococcal Conjugate Vaccine (20 Valent) (IM)    Other  Lab results discussed and reviewed with patient.  Labs and/or tests have been ordered for the evaluation/monitoring of acute/chronic conditions, to be done just before next visit.    Follow up in about 4 months (around 2/3/2023) for HLD, Insomnia,  Arthritis, Anxiety, Depression.        10/3/2022 [place]

## 2023-01-09 ENCOUNTER — TELEPHONE (OUTPATIENT)
Dept: FAMILY MEDICINE | Facility: CLINIC | Age: 68
End: 2023-01-09

## 2023-01-09 DIAGNOSIS — F41.0 PANIC DISORDER WITHOUT AGORAPHOBIA: ICD-10-CM

## 2023-01-09 RX ORDER — BUSPIRONE HYDROCHLORIDE 15 MG/1
15 TABLET ORAL 2 TIMES DAILY
Qty: 180 TABLET | Refills: 1 | Status: SHIPPED | OUTPATIENT
Start: 2023-01-09 | End: 2023-02-06 | Stop reason: SDUPTHER

## 2023-01-09 NOTE — TELEPHONE ENCOUNTER
----- Message from Citlalli Waters sent at 1/9/2023  3:30 PM CST -----  Vm- pt is having an problem with her rx and would like to talk to nurse  [phone]

## 2023-01-09 NOTE — TELEPHONE ENCOUNTER
----- Message from Paulina Sterling sent at 1/9/2023 12:13 PM CST -----  Refill on: busPIRone    [facility] [address]     [phone]

## 2023-01-10 ENCOUNTER — TELEPHONE (OUTPATIENT)
Dept: FAMILY MEDICINE | Facility: CLINIC | Age: 68
End: 2023-01-10

## 2023-01-10 NOTE — TELEPHONE ENCOUNTER
Spoke to patient. She had called yesterday saying there was an issue with her med but she said it is all good now that we called in the Rx for buspirone for her.

## 2023-01-10 NOTE — TELEPHONE ENCOUNTER
----- Message from Paulina Sterling sent at 1/10/2023  9:05 AM CST -----  Pt returning a phone call. [phone]

## 2023-01-26 ENCOUNTER — TELEPHONE (OUTPATIENT)
Dept: FAMILY MEDICINE | Facility: CLINIC | Age: 68
End: 2023-01-26

## 2023-02-01 ENCOUNTER — TELEPHONE (OUTPATIENT)
Dept: FAMILY MEDICINE | Facility: CLINIC | Age: 68
End: 2023-02-01

## 2023-02-01 DIAGNOSIS — F90.0 ATTENTION DEFICIT HYPERACTIVITY DISORDER (ADHD), PREDOMINANTLY INATTENTIVE TYPE: ICD-10-CM

## 2023-02-01 DIAGNOSIS — Z79.899 LONG-TERM USE OF HIGH-RISK MEDICATION: Primary | ICD-10-CM

## 2023-02-01 NOTE — TELEPHONE ENCOUNTER
Please sign order. I have it set up for lab. Patient here now, One entered in June was entered wrong.

## 2023-02-02 LAB
ALBUMIN SERPL-MCNC: 4.2 G/DL (ref 3.6–5.1)
ALBUMIN/GLOB SERPL: 1.4 (CALC) (ref 1–2.5)
ALP SERPL-CCNC: 159 U/L (ref 37–153)
ALT SERPL-CCNC: 36 U/L (ref 6–29)
AST SERPL-CCNC: 46 U/L (ref 10–35)
BILIRUB SERPL-MCNC: 0.5 MG/DL (ref 0.2–1.2)
BUN SERPL-MCNC: 13 MG/DL (ref 7–25)
BUN/CREAT SERPL: ABNORMAL (CALC) (ref 6–22)
CALCIUM SERPL-MCNC: 9.7 MG/DL (ref 8.6–10.4)
CHLORIDE SERPL-SCNC: 100 MMOL/L (ref 98–110)
CHOLEST SERPL-MCNC: 192 MG/DL
CHOLEST/HDLC SERPL: 3.5 (CALC)
CO2 SERPL-SCNC: 26 MMOL/L (ref 20–32)
CREAT SERPL-MCNC: 0.97 MG/DL (ref 0.5–1.05)
EGFR: 64 ML/MIN/1.73M2
GLOBULIN SER CALC-MCNC: 3 G/DL (CALC) (ref 1.9–3.7)
GLUCOSE SERPL-MCNC: 83 MG/DL (ref 65–99)
HDLC SERPL-MCNC: 55 MG/DL
LDLC SERPL CALC-MCNC: 103 MG/DL (CALC)
NONHDLC SERPL-MCNC: 137 MG/DL (CALC)
POTASSIUM SERPL-SCNC: 4.8 MMOL/L (ref 3.5–5.3)
PROT SERPL-MCNC: 7.2 G/DL (ref 6.1–8.1)
SODIUM SERPL-SCNC: 134 MMOL/L (ref 135–146)
TRIGL SERPL-MCNC: 225 MG/DL

## 2023-02-06 ENCOUNTER — OFFICE VISIT (OUTPATIENT)
Dept: FAMILY MEDICINE | Facility: CLINIC | Age: 68
End: 2023-02-06
Payer: MEDICARE

## 2023-02-06 VITALS
SYSTOLIC BLOOD PRESSURE: 118 MMHG | OXYGEN SATURATION: 99 % | HEART RATE: 97 BPM | DIASTOLIC BLOOD PRESSURE: 78 MMHG | BODY MASS INDEX: 33.43 KG/M2 | HEIGHT: 66 IN | WEIGHT: 208 LBS

## 2023-02-06 DIAGNOSIS — F90.0 ATTENTION DEFICIT HYPERACTIVITY DISORDER (ADHD), PREDOMINANTLY INATTENTIVE TYPE: ICD-10-CM

## 2023-02-06 DIAGNOSIS — L98.9 SKIN LESION OF CHEST WALL: ICD-10-CM

## 2023-02-06 DIAGNOSIS — Z91.81 RISK FOR FALLS: ICD-10-CM

## 2023-02-06 DIAGNOSIS — E66.9 CLASS 1 OBESITY WITH SERIOUS COMORBIDITY AND BODY MASS INDEX (BMI) OF 33.0 TO 33.9 IN ADULT, UNSPECIFIED OBESITY TYPE: ICD-10-CM

## 2023-02-06 DIAGNOSIS — F51.04 CHRONIC INSOMNIA: ICD-10-CM

## 2023-02-06 DIAGNOSIS — Z76.0 MEDICATION REFILL: ICD-10-CM

## 2023-02-06 DIAGNOSIS — F41.0 PANIC DISORDER WITHOUT AGORAPHOBIA: Primary | ICD-10-CM

## 2023-02-06 DIAGNOSIS — F33.9 CHRONIC RECURRENT MAJOR DEPRESSIVE DISORDER: ICD-10-CM

## 2023-02-06 DIAGNOSIS — M25.551 ARTHRALGIA OF RIGHT HIP: ICD-10-CM

## 2023-02-06 LAB
1OH-MIDAZOLAM UR-MCNC: NEGATIVE NG/ML
7AMINOCLONAZEPAM UR-MCNC: NEGATIVE NG/ML
A-OH ALPRAZ UR-MCNC: 28 NG/ML
A-OH-TRIAZOLAM UR-MCNC: NEGATIVE NG/ML
AMPHETAMINES UR QL: NEGATIVE NG/ML
BARBITURATES UR QL: NEGATIVE NG/ML
BENZODIAZ UR QL: POSITIVE NG/ML
BZE UR QL: NEGATIVE NG/ML
CREAT UR-MCNC: 20.1 MG/DL
DRUG SCREEN COMMENT UR-IMP: ABNORMAL
LORAZEPAM UR-MCNC: NEGATIVE NG/ML
METHADONE UR QL: NEGATIVE NG/ML
NORDIAZEPAM UR-MCNC: NEGATIVE NG/ML
NOTES AND COMMENTS: ABNORMAL
OH-ETHYLFLURAZ UR-MCNC: NEGATIVE NG/ML
OPIATES UR QL: NEGATIVE NG/ML
OXAZEPAM UR-MCNC: NEGATIVE NG/ML
OXIDANTS UR QL: NEGATIVE MCG/ML
OXYCODONE UR QL: NEGATIVE NG/ML
PCP UR QL: NEGATIVE NG/ML
PH UR: 7 [PH] (ref 4.5–9)
TEMAZEPAM UR-MCNC: NEGATIVE NG/ML

## 2023-02-06 PROCEDURE — 3008F BODY MASS INDEX DOCD: CPT | Mod: CPTII,S$GLB,, | Performed by: FAMILY MEDICINE

## 2023-02-06 PROCEDURE — 1101F PR PT FALLS ASSESS DOC 0-1 FALLS W/OUT INJ PAST YR: ICD-10-PCS | Mod: CPTII,S$GLB,, | Performed by: FAMILY MEDICINE

## 2023-02-06 PROCEDURE — 1101F PT FALLS ASSESS-DOCD LE1/YR: CPT | Mod: CPTII,S$GLB,, | Performed by: FAMILY MEDICINE

## 2023-02-06 PROCEDURE — 99214 OFFICE O/P EST MOD 30 MIN: CPT | Mod: S$GLB,,, | Performed by: FAMILY MEDICINE

## 2023-02-06 PROCEDURE — 3078F DIAST BP <80 MM HG: CPT | Mod: CPTII,S$GLB,, | Performed by: FAMILY MEDICINE

## 2023-02-06 PROCEDURE — 3074F SYST BP LT 130 MM HG: CPT | Mod: CPTII,S$GLB,, | Performed by: FAMILY MEDICINE

## 2023-02-06 PROCEDURE — 99214 PR OFFICE/OUTPT VISIT, EST, LEVL IV, 30-39 MIN: ICD-10-PCS | Mod: S$GLB,,, | Performed by: FAMILY MEDICINE

## 2023-02-06 PROCEDURE — 3288F PR FALLS RISK ASSESSMENT DOCUMENTED: ICD-10-PCS | Mod: CPTII,S$GLB,, | Performed by: FAMILY MEDICINE

## 2023-02-06 PROCEDURE — 1160F PR REVIEW ALL MEDS BY PRESCRIBER/CLIN PHARMACIST DOCUMENTED: ICD-10-PCS | Mod: CPTII,S$GLB,, | Performed by: FAMILY MEDICINE

## 2023-02-06 PROCEDURE — 3074F PR MOST RECENT SYSTOLIC BLOOD PRESSURE < 130 MM HG: ICD-10-PCS | Mod: CPTII,S$GLB,, | Performed by: FAMILY MEDICINE

## 2023-02-06 PROCEDURE — 3288F FALL RISK ASSESSMENT DOCD: CPT | Mod: CPTII,S$GLB,, | Performed by: FAMILY MEDICINE

## 2023-02-06 PROCEDURE — 3078F PR MOST RECENT DIASTOLIC BLOOD PRESSURE < 80 MM HG: ICD-10-PCS | Mod: CPTII,S$GLB,, | Performed by: FAMILY MEDICINE

## 2023-02-06 PROCEDURE — 3008F PR BODY MASS INDEX (BMI) DOCUMENTED: ICD-10-PCS | Mod: CPTII,S$GLB,, | Performed by: FAMILY MEDICINE

## 2023-02-06 PROCEDURE — 1159F MED LIST DOCD IN RCRD: CPT | Mod: CPTII,S$GLB,, | Performed by: FAMILY MEDICINE

## 2023-02-06 PROCEDURE — 1159F PR MEDICATION LIST DOCUMENTED IN MEDICAL RECORD: ICD-10-PCS | Mod: CPTII,S$GLB,, | Performed by: FAMILY MEDICINE

## 2023-02-06 PROCEDURE — 1160F RVW MEDS BY RX/DR IN RCRD: CPT | Mod: CPTII,S$GLB,, | Performed by: FAMILY MEDICINE

## 2023-02-06 RX ORDER — ESTRADIOL 0.1 MG/G
4 CREAM VAGINAL
Qty: 3 EACH | Refills: 1 | Status: SHIPPED | OUTPATIENT
Start: 2023-02-06 | End: 2023-02-07 | Stop reason: SDUPTHER

## 2023-02-06 RX ORDER — FLUOXETINE HYDROCHLORIDE 40 MG/1
40 CAPSULE ORAL DAILY
Qty: 90 CAPSULE | Refills: 1 | Status: SHIPPED | OUTPATIENT
Start: 2023-02-06 | End: 2023-07-07 | Stop reason: SDUPTHER

## 2023-02-06 RX ORDER — ZOLPIDEM TARTRATE 10 MG/1
10 TABLET ORAL NIGHTLY
Qty: 90 TABLET | Refills: 1 | Status: SHIPPED | OUTPATIENT
Start: 2023-02-06 | End: 2023-07-07 | Stop reason: SDUPTHER

## 2023-02-06 RX ORDER — NYSTATIN 100000 U/G
CREAM TOPICAL 2 TIMES DAILY
Qty: 30 G | Refills: 1 | Status: SHIPPED | OUTPATIENT
Start: 2023-02-06 | End: 2023-02-07 | Stop reason: SDUPTHER

## 2023-02-06 RX ORDER — VENLAFAXINE HYDROCHLORIDE 75 MG/1
75 CAPSULE, EXTENDED RELEASE ORAL DAILY
Qty: 90 CAPSULE | Refills: 1 | Status: SHIPPED | OUTPATIENT
Start: 2023-02-06 | End: 2023-07-07 | Stop reason: SDUPTHER

## 2023-02-06 RX ORDER — ALPRAZOLAM 1 MG/1
1 TABLET ORAL NIGHTLY PRN
Qty: 90 TABLET | Refills: 1 | Status: SHIPPED | OUTPATIENT
Start: 2023-02-06 | End: 2023-07-07 | Stop reason: SDUPTHER

## 2023-02-06 RX ORDER — ATORVASTATIN CALCIUM 20 MG/1
20 TABLET, FILM COATED ORAL DAILY
Qty: 90 TABLET | Refills: 1 | Status: SHIPPED | OUTPATIENT
Start: 2023-02-06 | End: 2023-07-07 | Stop reason: SDUPTHER

## 2023-02-06 RX ORDER — DEXTROAMPHETAMINE SACCHARATE, AMPHETAMINE ASPARTATE, DEXTROAMPHETAMINE SULFATE AND AMPHETAMINE SULFATE 5; 5; 5; 5 MG/1; MG/1; MG/1; MG/1
1 TABLET ORAL DAILY
Qty: 90 TABLET | Refills: 0 | Status: SHIPPED | OUTPATIENT
Start: 2023-02-06 | End: 2023-04-25 | Stop reason: SDUPTHER

## 2023-02-06 RX ORDER — BUSPIRONE HYDROCHLORIDE 15 MG/1
15 TABLET ORAL 2 TIMES DAILY
Qty: 180 TABLET | Refills: 1 | Status: SHIPPED | OUTPATIENT
Start: 2023-02-06 | End: 2024-01-04 | Stop reason: SDUPTHER

## 2023-02-06 RX ORDER — CYCLOBENZAPRINE HCL 10 MG
10 TABLET ORAL 3 TIMES DAILY PRN
Qty: 270 TABLET | Refills: 1 | Status: SHIPPED | OUTPATIENT
Start: 2023-02-06 | End: 2023-07-07 | Stop reason: SDUPTHER

## 2023-02-06 RX ORDER — DOXEPIN HYDROCHLORIDE 100 MG/1
100 CAPSULE ORAL NIGHTLY
Qty: 90 CAPSULE | Refills: 1 | Status: SHIPPED | OUTPATIENT
Start: 2023-02-06 | End: 2023-07-07 | Stop reason: SDUPTHER

## 2023-02-06 NOTE — TELEPHONE ENCOUNTER
The patient's prescription has been approved and sent to   [facility] - [place] - [address] [address]  [id number]  [place] [address]  Phone: [phone] Fax: [phone]

## 2023-02-06 NOTE — PROGRESS NOTES
SUBJECTIVE:    Patient ID: Stephanie Knox is a 67 y.o. female.    Chief Complaint: Follow-up (Brought bottles/flu denied/ adderrall concerns//dp)      Pt here to checkup on acute and chronic conditions.       Pt has some concerns about her weight. Trying to get at least 4000 steps daily. Walks around her property. Has been watching her diet. Has started eating breakfast. Subbing a protein shake for a meal. Has a scale and has been weighing herself. Has been making changes since.    Has lesions on her chest and left face. They start as blister. Has been around about 6 months. Has been wearing gloves to sleep. The face lesions dont itch, but the chest lesions. Has been putting antibacterial cream and antifungal on it, but not really helping.  Has not been to derm in a while ([place])    S/p right hip replacement ([facility] (from [employer] via [email or web address])), 2/22/2022. Feels like her hip has been clicking sometimes and is very stiff when she has been stiff for a while.  When she gets up has to take small steps and then once she gets moving she is ok. Had a little fall since last visit, missed a step on her ladder. No injury.  Does take occasional hydrocodone.  Still has leftover pills from last refill. Has chronic pain from across the back.     Anxiety and depression has been doing so-so. Comes and goes. Has lost about 5 good friends in the last few months.  Has been taking buspar and prozac.  (Has done biofeedback and psychology in the past, and that doesn't seem to help;failed trintellix, abilify.)    Sleeping ok on 1 ambien and 1 doxepin, getting a good 4-5 hours.  (failed trazodone, nightmares)    Continues to have chronic neck pain, which she is able to tolerate.     Remains smoke free.     Has been using cream for her bladder, states that it is good the first 2 days.    Had labs done: fBS 83, GFR 64, AST/ALT 46/36, , TSH 1.10, Hct 43.3, , alk phos  149  ---------------------------------------------------------------------  Pt had cscope 9/2022, no polyp ([place]) to repeat 3 years.   Mammogram 3/2021, due, already has order.  DEXA ordered.  [facility] exam UTD [facility]  Normal PVD screen.          Office Visit on 10/03/2022   Component Date Value Ref Range Status    Glucose 02/01/2023 83  65 - 99 mg/dL Final    BUN 02/01/2023 13  7 - 25 mg/dL Final    Creatinine 02/01/2023 0.97  0.50 - 1.05 mg/dL Final    eGFR 02/01/2023 64  > OR = 60 mL/min/1.73m2 Final    BUN/Creatinine Ratio 02/01/2023 NOT APPLICABLE  6 - 22 (calc) Final    Sodium 02/01/2023 134 (L)  135 - 146 mmol/L Final    Potassium 02/01/2023 4.8  3.5 - 5.3 mmol/L Final    Chloride 02/01/2023 100  98 - 110 mmol/L Final    CO2 02/01/2023 26  20 - 32 mmol/L Final    Calcium 02/01/2023 9.7  8.6 - 10.4 mg/dL Final    Total Protein 02/01/2023 7.2  6.1 - 8.1 g/dL Final    Albumin 02/01/2023 4.2  3.6 - 5.1 g/dL Final    Globulin, Total 02/01/2023 3.0  1.9 - 3.7 g/dL (calc) Final    Albumin/Globulin Ratio 02/01/2023 1.4  1.0 - 2.5 (calc) Final    Total Bilirubin 02/01/2023 0.5  0.2 - 1.2 mg/dL Final    Alkaline Phosphatase 02/01/2023 159 (H)  37 - 153 U/L Final    AST 02/01/2023 46 (H)  10 - 35 U/L Final    ALT 02/01/2023 36 (H)  6 - 29 U/L Final    Cholesterol 02/01/2023 192  <200 mg/dL Final    HDL 02/01/2023 55  > OR = 50 mg/dL Final    Triglycerides 02/01/2023 225 (H)  <150 mg/dL Final    LDL Cholesterol 02/01/2023 103 (H)  mg/dL (calc) Final    HDL/Cholesterol Ratio 02/01/2023 3.5  <5.0 (calc) Final    Non HDL Chol. (LDL+VLDL) 02/01/2023 137 (H)  <130 mg/dL (calc) Final       Past Medical History:   Diagnosis Date    ADHD (attention deficit hyperactivity disorder)     Anxiety     Arthritis     Colon polyps     Depression     Hyperlipidemia     Insomnia     Memory difficulties      Social History     Socioeconomic History    Marital status:    Tobacco Use    Smoking status: Former     Packs/day:  0.25     Types: Cigarettes    Smokeless tobacco: Never   Substance and Sexual Activity    Alcohol use: Not Currently    Drug use: Never     Social Determinants of Health     Financial Resource Strain: Low Risk     Difficulty of Paying Living Expenses: Not very hard   Food Insecurity: Unknown    Worried About Running Out of Food in the Last Year: Patient refused    Ran Out of Food in the Last Year: Never true   Transportation Needs: No Transportation Needs    Lack of Transportation (Medical): No    Lack of Transportation (Non-Medical): No   Physical Activity: Insufficiently Active    Days of Exercise per Week: 3 days    Minutes of Exercise per Session: 30 min   Stress: Stress Concern Present    Feeling of Stress : Rather much   Social Connections: Unknown    Frequency of Communication with Friends and Family: Twice a week    Frequency of Social Gatherings with Friends and Family: Patient refused    Active Member of Clubs or Organizations: Yes    Attends Club or Organization Meetings: 1 to 4 times per year    Marital Status:    Housing Stability: Low Risk     Unable to Pay for Housing in the Last Year: No    Number of Places Lived in the Last Year: 1    Unstable Housing in the Last Year: No     Past Surgical History:   Procedure Laterality Date    TONSILLECTOMY       Family History   Problem Relation Age of Onset    Emphysema Mother     Diabetes Father     Breast cancer Maternal Aunt        Review of patient's allergies indicates:  No Known Allergies    Current Outpatient Medications:     magnesium oxide (MAG-OX) 400 mg (241.3 mg magnesium) tablet, Take 400 mg by mouth once daily., Disp: , Rfl:     nystatin (MYCOSTATIN) cream, Apply topically 2 (two) times daily., Disp: 30 g, Rfl: 1    ALPRAZolam (XANAX) 1 MG tablet, Take 1 tablet (1 mg total) by mouth nightly as needed for Anxiety., Disp: 90 tablet, Rfl: 1    atorvastatin (LIPITOR) 20 MG tablet, Take 1 tablet (20 mg total) by mouth once daily., Disp: 90  tablet, Rfl: 1    busPIRone (BUSPAR) 15 MG tablet, Take 1 tablet (15 mg total) by mouth 2 (two) times daily., Disp: 180 tablet, Rfl: 1    cyclobenzaprine (FLEXERIL) 10 MG tablet, Take 1 tablet (10 mg total) by mouth 3 (three) times daily as needed., Disp: 270 tablet, Rfl: 1    dextroamphetamine-amphetamine (ADDERALL) 20 mg tablet, Take 1 tablet by mouth once daily., Disp: 90 tablet, Rfl: 0    doxepin (SINEQUAN) 100 MG capsule, Take 1 capsule (100 mg total) by mouth every evening., Disp: 90 capsule, Rfl: 1    estradioL (ESTRACE) 0.01 % (0.1 mg/gram) vaginal cream, Place 4 g vaginally twice a week., Disp: 3 each, Rfl: 1    FLUoxetine 40 MG capsule, Take 1 capsule (40 mg total) by mouth once daily., Disp: 90 capsule, Rfl: 1    venlafaxine (EFFEXOR-XR) 75 MG 24 hr capsule, Take 1 capsule (75 mg total) by mouth once daily., Disp: 90 capsule, Rfl: 1    zolpidem (AMBIEN) 10 mg Tab, Take 1 tablet (10 mg total) by mouth every evening., Disp: 90 tablet, Rfl: 1    Review of Systems   Constitutional:  Negative for appetite change, fatigue, fever and unexpected weight change.   Respiratory:  Negative for cough, chest tightness, shortness of breath and wheezing.    Cardiovascular:  Negative for chest pain and leg swelling.   Gastrointestinal:  Negative for abdominal pain, constipation, nausea and vomiting.        -heartburn   Genitourinary:  Negative for difficulty urinating, dysuria, frequency and urgency.   Musculoskeletal:  Positive for arthralgias (right hip) and neck pain. Negative for back pain and myalgias.   Skin:  Positive for rash (right face and chest).   Neurological:  Positive for numbness. Negative for dizziness, weakness and headaches.   Hematological:  Does not bruise/bleed easily.   Psychiatric/Behavioral:  Positive for dysphoric mood and sleep disturbance. Negative for suicidal ideas. The patient is not nervous/anxious.    All other systems reviewed and are negative.       Objective:      Vitals:    02/06/23  "1020   BP: 118/78   Pulse: 97   SpO2: 99%   Weight: 94.3 kg (208 lb)   Height: 5' 6" (1.676 m)     Wt Readings from Last 6 Encounters:   02/06/23 94.3 kg (208 lb)   10/03/22 90.7 kg (200 lb)   06/03/22 89.4 kg (197 lb)   04/18/22 89.8 kg (198 lb)   02/15/22 92.1 kg (203 lb)   01/03/22 88.9 kg (196 lb)             Physical Exam  Vitals reviewed.   Constitutional:       General: She is not in acute distress.     Appearance: Normal appearance. She is well-developed. She is obese.   HENT:      Head: Normocephalic and atraumatic.   Neck:      Thyroid: No thyromegaly.      Vascular: No carotid bruit.   Cardiovascular:      Rate and Rhythm: Normal rate and regular rhythm.      Heart sounds: Normal heart sounds. No murmur heard.    No friction rub.   Pulmonary:      Effort: Pulmonary effort is normal.      Breath sounds: Normal breath sounds. No wheezing or rales.   Abdominal:      General: Bowel sounds are normal. There is no distension.      Palpations: Abdomen is soft.      Tenderness: There is no abdominal tenderness.   Musculoskeletal:      Cervical back: Neck supple.   Lymphadenopathy:      Cervical: No cervical adenopathy.   Skin:     General: Skin is warm and dry.      Findings: No rash.      Comments: Healed blisters right face. Chest with raised slightly erythematous lesion on the middle of the chest and left breast, and open lesion on the left breast.   Neurological:      Mental Status: She is alert and oriented to person, place, and time.   Psychiatric:         Attention and Perception: She is attentive.         Speech: Speech normal.         Behavior: Behavior normal.         Thought Content: Thought content normal.         Judgment: Judgment normal.         Assessment:       1. Panic disorder without agoraphobia    2. Chronic insomnia    3. Arthralgia of right hip    4. Chronic recurrent major depressive disorder    5. Medication refill    6. Skin lesion of chest wall    7. Class 1 obesity with serious " comorbidity and body mass index (BMI) of 33.0 to 33.9 in adult, unspecified obesity type    8. Risk for falls    9. Attention deficit hyperactivity disorder (ADHD), predominantly inattentive type           Plan:       Panic disorder without agoraphobia  Comments:  Symptomatic.  Will continue to monitor for symptoms  Orders:  -     busPIRone (BUSPAR) 15 MG tablet; Take 1 tablet (15 mg total) by mouth 2 (two) times daily.  Dispense: 180 tablet; Refill: 1  -     venlafaxine (EFFEXOR-XR) 75 MG 24 hr capsule; Take 1 capsule (75 mg total) by mouth once daily.  Dispense: 90 capsule; Refill: 1  -     ALPRAZolam (XANAX) 1 MG tablet; Take 1 tablet (1 mg total) by mouth nightly as needed for Anxiety.  Dispense: 90 tablet; Refill: 1    Chronic insomnia  Comments:  Stable. To continue on ambien/doxepin.  Will continue on monitor symptoms.  Orders:  -     zolpidem (AMBIEN) 10 mg Tab; Take 1 tablet (10 mg total) by mouth every evening.  Dispense: 90 tablet; Refill: 1  -     doxepin (SINEQUAN) 100 MG capsule; Take 1 capsule (100 mg total) by mouth every evening.  Dispense: 90 capsule; Refill: 1    Arthralgia of right hip  Comments:  Symptomatic. To continue to use cane as needed to prevent falls. To use prn hydrocodone sparingly.  Orders:  -     cyclobenzaprine (FLEXERIL) 10 MG tablet; Take 1 tablet (10 mg total) by mouth 3 (three) times daily as needed.  Dispense: 270 tablet; Refill: 1    Chronic recurrent major depressive disorder  Comments:  Stable. Will continue to monitor symptoms on current mediation, prozac/effexor/buspar  Orders:  -     FLUoxetine 40 MG capsule; Take 1 capsule (40 mg total) by mouth once daily.  Dispense: 90 capsule; Refill: 1    Medication refill  -     atorvastatin (LIPITOR) 20 MG tablet; Take 1 tablet (20 mg total) by mouth once daily.  Dispense: 90 tablet; Refill: 1    Skin lesion of chest wall  Comments:  Etiology unknow. Not responding to usual treatment, antifungal, etc. Will refer to  Derm  Orders:  -     Ambulatory referral/consult to Dermatology; Future; Expected date: 02/13/2023    Class 1 obesity with serious comorbidity and body mass index (BMI) of 33.0 to 33.9 in adult, unspecified obesity type  Comments:  Chronic. BMI 33.57. Discussed to continue dietary discretion and regular exercise    Risk for falls    Attention deficit hyperactivity disorder (ADHD), predominantly inattentive type  Comments:  Focus controlled. Will continue to monitor tolerability and effectiveness of Adderall  Orders:  -     dextroamphetamine-amphetamine (ADDERALL) 20 mg tablet; Take 1 tablet by mouth once daily.  Dispense: 90 tablet; Refill: 0        Other  Lab results discussed and reviewed with patient.  Labs and/or tests have been ordered for the evaluation/monitoring of acute/chronic conditions, to be done just before next visit.    Follow up in about 4 months (around 6/6/2023) for Anxiety, Insomnia, Arthritis, Depression.        2/6/2023 [place]

## 2023-02-06 NOTE — PATIENT INSTRUCTIONS
Mariel Liao MD  [address] [place] [address] [address]  [address]  [place] [address]  Phone: [phone]  Fax: [phone]

## 2023-02-07 ENCOUNTER — TELEPHONE (OUTPATIENT)
Dept: DERMATOLOGY | Facility: CLINIC | Age: 68
End: 2023-02-07
Payer: MEDICARE

## 2023-02-07 ENCOUNTER — TELEPHONE (OUTPATIENT)
Dept: FAMILY MEDICINE | Facility: CLINIC | Age: 68
End: 2023-02-07
Payer: MEDICARE

## 2023-02-07 DIAGNOSIS — Z76.0 MEDICATION REFILL: ICD-10-CM

## 2023-02-07 NOTE — TELEPHONE ENCOUNTER
----- Message from Naeem Daniels MA sent at 2/7/2023 11:35 AM CST -----  Regarding: Pt Called  Name of Who is Calling: ABE MOLINA         What is the request in detail: Pt is requesting a call back regarding scheduling appt. Pt has a referral in to see       Can the clinic reply by [facility]: No        What Number to Call Back if not in [facility]: [phone]

## 2023-02-07 NOTE — TELEPHONE ENCOUNTER
----- Message from Marla Davenport MA sent at 2/7/2023 12:23 PM CST -----  Regarding: pharmacy change  Pt calling asking for her estradiol and nystatin cream to be sent to [facility] [place]. All of her other medication are at that location and she doesn't use [facility] pharmacy anymore. [phone]

## 2023-02-08 RX ORDER — ESTRADIOL 0.1 MG/G
4 CREAM VAGINAL
Qty: 3 EACH | Refills: 1 | Status: SHIPPED | OUTPATIENT
Start: 2023-02-09 | End: 2024-01-11

## 2023-02-08 RX ORDER — NYSTATIN 100000 U/G
CREAM TOPICAL 2 TIMES DAILY
Qty: 30 G | Refills: 1 | Status: SHIPPED | OUTPATIENT
Start: 2023-02-08

## 2023-02-09 NOTE — TELEPHONE ENCOUNTER
The patient's prescription has been approved and sent to   [id number] - [place], [place] - [address] AT [facility] [address]  [address] [address]  Phone: [phone] Fax: [phone]

## 2023-03-30 ENCOUNTER — OFFICE VISIT (OUTPATIENT)
Dept: DERMATOLOGY | Facility: CLINIC | Age: 68
End: 2023-03-30
Payer: MEDICARE

## 2023-03-30 DIAGNOSIS — L28.1 PRURIGO NODULARIS: Primary | ICD-10-CM

## 2023-03-30 DIAGNOSIS — L81.4 LENTIGO: ICD-10-CM

## 2023-03-30 DIAGNOSIS — L57.3 POIKILODERMA OF CIVATTE: ICD-10-CM

## 2023-03-30 DIAGNOSIS — L30.4 INTERTRIGO: ICD-10-CM

## 2023-03-30 PROCEDURE — 1101F PT FALLS ASSESS-DOCD LE1/YR: CPT | Mod: CPTII,S$GLB,, | Performed by: STUDENT IN AN ORGANIZED HEALTH CARE EDUCATION/TRAINING PROGRAM

## 2023-03-30 PROCEDURE — 3288F PR FALLS RISK ASSESSMENT DOCUMENTED: ICD-10-PCS | Mod: CPTII,S$GLB,, | Performed by: STUDENT IN AN ORGANIZED HEALTH CARE EDUCATION/TRAINING PROGRAM

## 2023-03-30 PROCEDURE — 99999 PR PBB SHADOW E&M-EST. PATIENT-LVL III: ICD-10-PCS | Mod: PBBFAC,,, | Performed by: STUDENT IN AN ORGANIZED HEALTH CARE EDUCATION/TRAINING PROGRAM

## 2023-03-30 PROCEDURE — 1160F PR REVIEW ALL MEDS BY PRESCRIBER/CLIN PHARMACIST DOCUMENTED: ICD-10-PCS | Mod: CPTII,S$GLB,, | Performed by: STUDENT IN AN ORGANIZED HEALTH CARE EDUCATION/TRAINING PROGRAM

## 2023-03-30 PROCEDURE — 1126F AMNT PAIN NOTED NONE PRSNT: CPT | Mod: CPTII,S$GLB,, | Performed by: STUDENT IN AN ORGANIZED HEALTH CARE EDUCATION/TRAINING PROGRAM

## 2023-03-30 PROCEDURE — 1159F PR MEDICATION LIST DOCUMENTED IN MEDICAL RECORD: ICD-10-PCS | Mod: CPTII,S$GLB,, | Performed by: STUDENT IN AN ORGANIZED HEALTH CARE EDUCATION/TRAINING PROGRAM

## 2023-03-30 PROCEDURE — 11900 PR INJECTION INTO SKIN LESIONS, UP TO 7: ICD-10-PCS | Mod: S$GLB,,, | Performed by: STUDENT IN AN ORGANIZED HEALTH CARE EDUCATION/TRAINING PROGRAM

## 2023-03-30 PROCEDURE — 1101F PR PT FALLS ASSESS DOC 0-1 FALLS W/OUT INJ PAST YR: ICD-10-PCS | Mod: CPTII,S$GLB,, | Performed by: STUDENT IN AN ORGANIZED HEALTH CARE EDUCATION/TRAINING PROGRAM

## 2023-03-30 PROCEDURE — 1126F PR PAIN SEVERITY QUANTIFIED, NO PAIN PRESENT: ICD-10-PCS | Mod: CPTII,S$GLB,, | Performed by: STUDENT IN AN ORGANIZED HEALTH CARE EDUCATION/TRAINING PROGRAM

## 2023-03-30 PROCEDURE — 99204 OFFICE O/P NEW MOD 45 MIN: CPT | Mod: 25,S$GLB,, | Performed by: STUDENT IN AN ORGANIZED HEALTH CARE EDUCATION/TRAINING PROGRAM

## 2023-03-30 PROCEDURE — 99999 PR PBB SHADOW E&M-EST. PATIENT-LVL III: CPT | Mod: PBBFAC,,, | Performed by: STUDENT IN AN ORGANIZED HEALTH CARE EDUCATION/TRAINING PROGRAM

## 2023-03-30 PROCEDURE — 1159F MED LIST DOCD IN RCRD: CPT | Mod: CPTII,S$GLB,, | Performed by: STUDENT IN AN ORGANIZED HEALTH CARE EDUCATION/TRAINING PROGRAM

## 2023-03-30 PROCEDURE — 1160F RVW MEDS BY RX/DR IN RCRD: CPT | Mod: CPTII,S$GLB,, | Performed by: STUDENT IN AN ORGANIZED HEALTH CARE EDUCATION/TRAINING PROGRAM

## 2023-03-30 PROCEDURE — 11900 INJECT SKIN LESIONS </W 7: CPT | Mod: S$GLB,,, | Performed by: STUDENT IN AN ORGANIZED HEALTH CARE EDUCATION/TRAINING PROGRAM

## 2023-03-30 PROCEDURE — 3288F FALL RISK ASSESSMENT DOCD: CPT | Mod: CPTII,S$GLB,, | Performed by: STUDENT IN AN ORGANIZED HEALTH CARE EDUCATION/TRAINING PROGRAM

## 2023-03-30 PROCEDURE — 99204 PR OFFICE/OUTPT VISIT, NEW, LEVL IV, 45-59 MIN: ICD-10-PCS | Mod: 25,S$GLB,, | Performed by: STUDENT IN AN ORGANIZED HEALTH CARE EDUCATION/TRAINING PROGRAM

## 2023-03-30 RX ORDER — KETOCONAZOLE 20 MG/G
CREAM TOPICAL 2 TIMES DAILY
Qty: 60 G | Refills: 1 | Status: SHIPPED | OUTPATIENT
Start: 2023-03-30 | End: 2023-07-05 | Stop reason: SDUPTHER

## 2023-03-30 RX ORDER — BETAMETHASONE DIPROPIONATE 0.5 MG/G
CREAM TOPICAL 2 TIMES DAILY
Qty: 50 G | Refills: 1 | Status: SHIPPED | OUTPATIENT
Start: 2023-03-30 | End: 2023-07-05 | Stop reason: SDUPTHER

## 2023-03-30 RX ORDER — TRIAMCINOLONE ACETONIDE 0.25 MG/G
CREAM TOPICAL 2 TIMES DAILY
Qty: 80 G | Refills: 1 | Status: SHIPPED | OUTPATIENT
Start: 2023-03-30 | End: 2023-07-05 | Stop reason: SDUPTHER

## 2023-03-30 NOTE — PROGRESS NOTES
Subjective:      Patient ID:  Stephanie Knox is a 67 y.o. female who presents for   Chief Complaint   Patient presents with    Rash     Neck, chest, under breasts     New patient     Patient here today for rash on neck, chest and under breasts x months. Patient states this is the second time she has had this. First time was last year. States it is very itchy and sometimes the skin will feel tight. Starting to leave scars. She has tried Nystatin cream, helped a little.   She admits to picking at spots, gala at night, denies itching     Also complains of spot on right cheek x months. Not bothersome. States she can also see a dark line on the side of her face.    Has anxiety, difficulty sleeping and anxiety attacks      Current Outpatient Medications:   ·  ALPRAZolam (XANAX) 1 MG tablet, Take 1 tablet (1 mg total) by mouth nightly as needed for Anxiety., Disp: 90 tablet, Rfl: 1  ·  atorvastatin (LIPITOR) 20 MG tablet, Take 1 tablet (20 mg total) by mouth once daily., Disp: 90 tablet, Rfl: 1  ·  busPIRone (BUSPAR) 15 MG tablet, Take 1 tablet (15 mg total) by mouth 2 (two) times daily., Disp: 180 tablet, Rfl: 1  ·  cyclobenzaprine (FLEXERIL) 10 MG tablet, Take 1 tablet (10 mg total) by mouth 3 (three) times daily as needed., Disp: 270 tablet, Rfl: 1  ·  dextroamphetamine-amphetamine (ADDERALL) 20 mg tablet, Take 1 tablet by mouth once daily., Disp: 90 tablet, Rfl: 0  ·  doxepin (SINEQUAN) 100 MG capsule, Take 1 capsule (100 mg total) by mouth every evening., Disp: 90 capsule, Rfl: 1  ·  estradioL (ESTRACE) 0.01 % (0.1 mg/gram) vaginal cream, Place 4 g vaginally twice a week., Disp: 3 each, Rfl: 1  ·  FLUoxetine 40 MG capsule, Take 1 capsule (40 mg total) by mouth once daily., Disp: 90 capsule, Rfl: 1  ·  magnesium oxide (MAG-OX) 400 mg (241.3 mg magnesium) tablet, Take 400 mg by mouth once daily., Disp: , Rfl:   ·  nystatin (MYCOSTATIN) cream, Apply topically 2 (two) times daily., Disp: 30 g, Rfl: 1  ·  venlafaxine  (EFFEXOR-XR) 75 MG 24 hr capsule, Take 1 capsule (75 mg total) by mouth once daily., Disp: 90 capsule, Rfl: 1  ·  zolpidem (AMBIEN) 10 mg Tab, Take 1 tablet (10 mg total) by mouth every evening., Disp: 90 tablet, Rfl: 1              Review of Systems   Constitutional:  Negative for fever, chills and fatigue.   Respiratory:  Negative for cough and shortness of breath.    Gastrointestinal:  Negative for nausea and vomiting.   Skin:  Positive for itching and rash.     Objective:   Physical Exam   Constitutional: She appears well-developed and well-nourished.   Neurological: She is alert and oriented to person, place, and time.   Psychiatric: She has a normal mood and affect.   Skin:   Areas Examined (abnormalities noted in diagram):   Head / Face Inspection Performed  Neck Inspection Performed  Chest / Axilla Inspection Performed               Diagram Legend     Erythematous scaling macule/papule c/w actinic keratosis       Vascular papule c/w angioma      Pigmented verrucoid papule/plaque c/w seborrheic keratosis      Yellow umbilicated papule c/w sebaceous hyperplasia      Irregularly shaped tan macule c/w lentigo     1-2 mm smooth white papules consistent with Milia      Movable subcutaneous cyst with punctum c/w epidermal inclusion cyst      Subcutaneous movable cyst c/w pilar cyst      Firm pink to brown papule c/w dermatofibroma      Pedunculated fleshy papule(s) c/w skin tag(s)      Evenly pigmented macule c/w junctional nevus     Mildly variegated pigmented, slightly irregular-bordered macule c/w mildly atypical nevus      Flesh colored to evenly pigmented papule c/w intradermal nevus       Pink pearly papule/plaque c/w basal cell carcinoma      Erythematous hyperkeratotic cursted plaque c/w SCC      Surgical scar with no sign of skin cancer recurrence      Open and closed comedones      Inflammatory papules and pustules      Verrucoid papule consistent consistent with wart     Erythematous eczematous patches  and plaques     Dystrophic onycholytic nail with subungual debris c/w onychomycosis     Umbilicated papule    Erythematous-base heme-crusted tan verrucoid plaque consistent with inflamed seborrheic keratosis     Erythematous Silvery Scaling Plaque c/w Psoriasis     See annotation            Assessment / Plan:        Prurigo nodularis, chest, jawline  -     augmented betamethasone dipropionate (DIPROLENE-AF) 0.05 % cream; Apply topically 2 (two) times daily.  Dispense: 50 g; Refill: 1  Intralesional Kenalog 5mg/cc (3 cc total) injected into 6 lesions on the chest today after obtaining verbal consent including risk of surrounding hypopigmentation. Patient tolerated procedure well.    Units: 2  NDC for Kenalog 10mg/cc:  5330-9608-52  For chest/neck:  - keep nails trimmed short  - avoid scratching  - apply diprolene ointment on chest and keep covered with telfa non stick pads- keep covered  - for jawline can use mighty patch hydrocolloid dressing  - reviewed labs- LFTs elevated- recommend discussing w/ PCP  - suspect anxiety plays a role    Intertrigo  Comments:  Etiology unknow. Not responding to usual treatment, antifungal, etc. Will refer to Derm  Orders:  -     Ambulatory referral/consult to Dermatology  -     ketoconazole (NIZORAL) 2 % cream; Apply topically 2 (two) times daily.  Dispense: 60 g; Refill: 1  -     triamcinolone acetonide 0.025% (KENALOG) 0.025 % cream; Apply topically 2 (two) times daily.  Dispense: 80 g; Refill: 1  - for under breasts mix triamcinolone cream with ketoconazole cream and apply twice daily then after improvement start using either zeasorb AF powder or carpe sweat absorbing groin powder      Lentigo  This is a benign hyperpigmented sun induced lesion. Daily sun protection will reduce the number of new lesions. Treatment of these benign lesions are considered cosmetic.    Poikiloderma of [place]  Neck/ upper chest  - strict sun protection           No follow-ups on file.

## 2023-03-30 NOTE — PATIENT INSTRUCTIONS
- for under breasts mix triamcinolone cream with ketoconazole cream and apply twice daily then after improvement start using either zeasorb AF powder or carpe sweat absorbing groin powder    For chest/neck:  - keep nails trimmed short  - avoid scratching  - apply diprolene ointment on chest and keep covered with telfa non stick pads- keep covered  - for jawline can use mighty patch hydrocolloid dressing

## 2023-04-11 ENCOUNTER — PATIENT MESSAGE (OUTPATIENT)
Dept: ADMINISTRATIVE | Facility: HOSPITAL | Age: 68
End: 2023-04-11
Payer: MEDICARE

## 2023-04-25 DIAGNOSIS — F90.0 ATTENTION DEFICIT HYPERACTIVITY DISORDER (ADHD), PREDOMINANTLY INATTENTIVE TYPE: ICD-10-CM

## 2023-04-25 NOTE — TELEPHONE ENCOUNTER
----- Message from Marla Davenport MA sent at 4/25/2023 12:59 PM CDT -----  Regarding: med issue  Pt calling stating she hasn't gotten her adderrall meds due to the pharmacy saying they don't have a current perscritption. [phone]

## 2023-04-26 RX ORDER — DEXTROAMPHETAMINE SACCHARATE, AMPHETAMINE ASPARTATE, DEXTROAMPHETAMINE SULFATE AND AMPHETAMINE SULFATE 5; 5; 5; 5 MG/1; MG/1; MG/1; MG/1
1 TABLET ORAL DAILY
Qty: 90 TABLET | Refills: 0 | Status: SHIPPED | OUTPATIENT
Start: 2023-05-09

## 2023-04-26 NOTE — TELEPHONE ENCOUNTER
The patient's prescription has been approved and [id number] - [place], [place] - [address] AT [facility] [address]  [address] [address]  Phone: [phone] Fax: [phone]

## 2023-06-08 ENCOUNTER — TELEPHONE (OUTPATIENT)
Dept: FAMILY MEDICINE | Facility: CLINIC | Age: 68
End: 2023-06-08

## 2023-06-08 NOTE — TELEPHONE ENCOUNTER
----- Message from Paulina Ennis MA sent at 6/8/2023  8:05 AM CDT -----  Regarding: needs to reschedule  Patient could not get out of [place] today. Flight was cancelled by airline. Patient has appt today at 10:20 am. Needs to come in before only available date of august 2023  [phone]

## 2023-07-05 DIAGNOSIS — L28.1 PRURIGO NODULARIS: ICD-10-CM

## 2023-07-05 DIAGNOSIS — L30.4 INTERTRIGO: ICD-10-CM

## 2023-07-06 RX ORDER — TRIAMCINOLONE ACETONIDE 0.25 MG/G
CREAM TOPICAL 2 TIMES DAILY
Qty: 80 G | Refills: 1 | Status: SHIPPED | OUTPATIENT
Start: 2023-07-06

## 2023-07-06 RX ORDER — BETAMETHASONE DIPROPIONATE 0.5 MG/G
CREAM TOPICAL 2 TIMES DAILY
Qty: 50 G | Refills: 1 | Status: SHIPPED | OUTPATIENT
Start: 2023-07-06 | End: 2024-01-11

## 2023-07-06 RX ORDER — KETOCONAZOLE 20 MG/G
CREAM TOPICAL 2 TIMES DAILY
Qty: 60 G | Refills: 1 | Status: SHIPPED | OUTPATIENT
Start: 2023-07-06 | End: 2024-01-11

## 2023-07-07 ENCOUNTER — OFFICE VISIT (OUTPATIENT)
Dept: FAMILY MEDICINE | Facility: CLINIC | Age: 68
End: 2023-07-07
Payer: MEDICARE

## 2023-07-07 ENCOUNTER — TELEPHONE (OUTPATIENT)
Dept: FAMILY MEDICINE | Facility: CLINIC | Age: 68
End: 2023-07-07

## 2023-07-07 VITALS
OXYGEN SATURATION: 95 % | SYSTOLIC BLOOD PRESSURE: 114 MMHG | DIASTOLIC BLOOD PRESSURE: 64 MMHG | WEIGHT: 215 LBS | HEART RATE: 96 BPM | HEIGHT: 66 IN | BODY MASS INDEX: 34.55 KG/M2

## 2023-07-07 DIAGNOSIS — F90.0 ATTENTION DEFICIT HYPERACTIVITY DISORDER (ADHD), PREDOMINANTLY INATTENTIVE TYPE: ICD-10-CM

## 2023-07-07 DIAGNOSIS — R10.31 BILATERAL GROIN PAIN: ICD-10-CM

## 2023-07-07 DIAGNOSIS — Z13.820 SCREENING FOR OSTEOPOROSIS: ICD-10-CM

## 2023-07-07 DIAGNOSIS — F41.0 PANIC DISORDER WITHOUT AGORAPHOBIA: ICD-10-CM

## 2023-07-07 DIAGNOSIS — R10.32 BILATERAL GROIN PAIN: ICD-10-CM

## 2023-07-07 DIAGNOSIS — F33.9 CHRONIC RECURRENT MAJOR DEPRESSIVE DISORDER: ICD-10-CM

## 2023-07-07 DIAGNOSIS — F51.04 CHRONIC INSOMNIA: ICD-10-CM

## 2023-07-07 DIAGNOSIS — Z78.0 MENOPAUSE: ICD-10-CM

## 2023-07-07 DIAGNOSIS — Z76.0 MEDICATION REFILL: ICD-10-CM

## 2023-07-07 DIAGNOSIS — Z12.31 SCREENING MAMMOGRAM, ENCOUNTER FOR: ICD-10-CM

## 2023-07-07 DIAGNOSIS — M54.50 ACUTE BILATERAL LOW BACK PAIN, UNSPECIFIED WHETHER SCIATICA PRESENT: ICD-10-CM

## 2023-07-07 DIAGNOSIS — R20.2 PARESTHESIA OF FOOT, BILATERAL: ICD-10-CM
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
## 2023-07-07 PROCEDURE — 3288F PR FALLS RISK ASSESSMENT DOCUMENTED: ICD-10-PCS | Mod: CPTII,S$GLB,, | Performed by: FAMILY MEDICINE

## 2023-07-07 PROCEDURE — 99214 PR OFFICE/OUTPT VISIT, EST, LEVL IV, 30-39 MIN: ICD-10-PCS | Mod: S$GLB,,, | Performed by: FAMILY MEDICINE

## 2023-07-07 PROCEDURE — 3008F PR BODY MASS INDEX (BMI) DOCUMENTED: ICD-10-PCS | Mod: CPTII,S$GLB,, | Performed by: FAMILY MEDICINE

## 2023-07-07 PROCEDURE — 1101F PR PT FALLS ASSESS DOC 0-1 FALLS W/OUT INJ PAST YR: ICD-10-PCS | Mod: CPTII,S$GLB,, | Performed by: FAMILY MEDICINE

## 2023-07-07 PROCEDURE — 99214 OFFICE O/P EST MOD 30 MIN: CPT | Mod: S$GLB,,, | Performed by: FAMILY MEDICINE

## 2023-07-07 PROCEDURE — 3074F PR MOST RECENT SYSTOLIC BLOOD PRESSURE < 130 MM HG: ICD-10-PCS | Mod: CPTII,S$GLB,, | Performed by: FAMILY MEDICINE

## 2023-07-07 PROCEDURE — 1159F PR MEDICATION LIST DOCUMENTED IN MEDICAL RECORD: ICD-10-PCS | Mod: CPTII,S$GLB,, | Performed by: FAMILY MEDICINE

## 2023-07-07 PROCEDURE — 1160F PR REVIEW ALL MEDS BY PRESCRIBER/CLIN PHARMACIST DOCUMENTED: ICD-10-PCS | Mod: CPTII,S$GLB,, | Performed by: FAMILY MEDICINE

## 2023-07-07 PROCEDURE — 3074F SYST BP LT 130 MM HG: CPT | Mod: CPTII,S$GLB,, | Performed by: FAMILY MEDICINE

## 2023-07-07 PROCEDURE — 3008F BODY MASS INDEX DOCD: CPT | Mod: CPTII,S$GLB,, | Performed by: FAMILY MEDICINE

## 2023-07-07 PROCEDURE — 1101F PT FALLS ASSESS-DOCD LE1/YR: CPT | Mod: CPTII,S$GLB,, | Performed by: FAMILY MEDICINE

## 2023-07-07 PROCEDURE — 3078F PR MOST RECENT DIASTOLIC BLOOD PRESSURE < 80 MM HG: ICD-10-PCS | Mod: CPTII,S$GLB,, | Performed by: FAMILY MEDICINE

## 2023-07-07 PROCEDURE — 1160F RVW MEDS BY RX/DR IN RCRD: CPT | Mod: CPTII,S$GLB,, | Performed by: FAMILY MEDICINE

## 2023-07-07 PROCEDURE — 3078F DIAST BP <80 MM HG: CPT | Mod: CPTII,S$GLB,, | Performed by: FAMILY MEDICINE

## 2023-07-07 PROCEDURE — 1159F MED LIST DOCD IN RCRD: CPT | Mod: CPTII,S$GLB,, | Performed by: FAMILY MEDICINE

## 2023-07-07 PROCEDURE — 3288F FALL RISK ASSESSMENT DOCD: CPT | Mod: CPTII,S$GLB,, | Performed by: FAMILY MEDICINE

## 2023-07-07 RX ORDER — ATORVASTATIN CALCIUM 20 MG/1
20 TABLET, FILM COATED ORAL DAILY
Qty: 90 TABLET | Refills: 3 | Status: SHIPPED | OUTPATIENT
Start: 2023-07-07

## 2023-07-07 RX ORDER — FLUOXETINE HYDROCHLORIDE 40 MG/1
40 CAPSULE ORAL DAILY
Qty: 90 CAPSULE | Refills: 3 | Status: SHIPPED | OUTPATIENT
Start: 2023-07-07

## 2023-07-07 RX ORDER — ALPRAZOLAM 1 MG/1
1 TABLET ORAL NIGHTLY PRN
Qty: 90 TABLET | Refills: 1 | Status: SHIPPED | OUTPATIENT
Start: 2023-07-07 | End: 2024-01-03 | Stop reason: SDUPTHER

## 2023-07-07 RX ORDER — VENLAFAXINE HYDROCHLORIDE 75 MG/1
75 CAPSULE, EXTENDED RELEASE ORAL DAILY
Qty: 90 CAPSULE | Refills: 3 | Status: SHIPPED | OUTPATIENT
Start: 2023-07-07

## 2023-07-07 RX ORDER — DOXEPIN HYDROCHLORIDE 100 MG/1
100 CAPSULE ORAL NIGHTLY
Qty: 90 CAPSULE | Refills: 3 | Status: SHIPPED | OUTPATIENT
Start: 2023-07-07

## 2023-07-07 RX ORDER — ZOLPIDEM TARTRATE 10 MG/1
10 TABLET ORAL NIGHTLY
Qty: 90 TABLET | Refills: 1 | Status: SHIPPED | OUTPATIENT
Start: 2023-07-07 | End: 2024-01-04 | Stop reason: SDUPTHER

## 2023-07-07 RX ORDER — CYCLOBENZAPRINE HCL 10 MG
10 TABLET ORAL 3 TIMES DAILY PRN
Qty: 270 TABLET | Refills: 1 | Status: SHIPPED | OUTPATIENT
Start: 2023-07-07

## 2023-07-07 NOTE — PROGRESS NOTES
SUBJECTIVE:    Patient ID: Stephanie Knox is a 68 y.o. female.    Chief Complaint: Anxiety (4mth, brought bottles// SW)      Pt here to checkup on acute and chronic conditions.     Pt fell a few weeks ago, tripped on the lip of a curve. Fell forward onto her hands. Did not hit her face. Both legs are hurting since she fell. Hurting in the groin with radiation around to her back, and quite stiff. Has trouble getting up from a standing position if sitting too low without having something to up pull herself up. Her feet and thighs have been numb since she fell L>R.       Does take occasional hydrocodone.  Still has leftover pills from last refill. Has chronic pain from across the back.     Anxiety and depression has been doing so-so. Comes and goes. Has been taking buspar and prozac.  (Has done biofeedback and psychology in the past, and that doesn't seem to help;failed trintellix, abilify.) continues to take adderall which helps with her focus.    Has not been sleeping well since she feel despite meds. Still taking 1 ambien and 1 doxepin, getting a good 4-5 hours.  Restless, trying to get pain. (failed trazodone, nightmares)    Continues to have chronic neck pain, which she is able to tolerate. It is not hurting any more since she fell.    Remains smoke free.     Has been gaining weight.    No recent labs.  ---------------------------------------------------------------------  Pt had cscope 9/2022, no polyp (Kashif) to repeat 3 years.   Mammogram 3/2021, due, already has order.  DEXA ordered.  Eye exam UTD at [facility]  Normal PVD screen.              Past Medical History:   Diagnosis Date    ADHD (attention deficit hyperactivity disorder)     Anxiety     Arthritis     Colon polyps     Depression     Hyperlipidemia     Insomnia     Memory difficulties      Social History     Socioeconomic History    Marital status:    Tobacco Use    Smoking status: Former     Packs/day: 0.25     Types: Cigarettes     Smokeless tobacco: Never   Substance and Sexual Activity    Alcohol use: Not Currently    Drug use: Never     Social Determinants of Health     Financial Resource Strain: Low Risk     Difficulty of Paying Living Expenses: Not very hard   Food Insecurity: No Food Insecurity    Worried About Running Out of Food in the Last Year: Never true    Ran Out of Food in the Last Year: Never true   Transportation Needs: No Transportation Needs    Lack of Transportation (Medical): No    Lack of Transportation (Non-Medical): No   Physical Activity: Insufficiently Active    Days of Exercise per Week: 4 days    Minutes of Exercise per Session: 30 min   Stress: Stress Concern Present    Feeling of Stress : Very much   Social Connections: Unknown    Frequency of Communication with Friends and Family: Three times a week    Frequency of Social Gatherings with Friends and Family: Patient refused    Active Member of Clubs or Organizations: Yes    Attends Club or Organization Meetings: More than 4 times per year    Marital Status:    Housing Stability: Low Risk     Unable to Pay for Housing in the Last Year: No    Number of Places Lived in the Last Year: 1    Unstable Housing in the Last Year: No     Past Surgical History:   Procedure Laterality Date    TONSILLECTOMY       Family History   Problem Relation Age of Onset    Emphysema Mother     Diabetes Father     Breast cancer Maternal Aunt        Review of patient's allergies indicates:  No Known Allergies    Current Outpatient Medications:     augmented betamethasone dipropionate (DIPROLENE-AF) 0.05 % cream, Apply topically 2 (two) times daily., Disp: 50 g, Rfl: 1    busPIRone (BUSPAR) 15 MG tablet, Take 1 tablet (15 mg total) by mouth 2 (two) times daily., Disp: 180 tablet, Rfl: 1    dextroamphetamine-amphetamine (ADDERALL) 20 mg tablet, Take 1 tablet by mouth once daily., Disp: 90 tablet, Rfl: 0    estradioL (ESTRACE) 0.01 % (0.1 mg/gram) vaginal cream, Place 4 g vaginally twice  a week., Disp: 3 each, Rfl: 1    ketoconazole (NIZORAL) 2 % cream, Apply topically 2 (two) times daily., Disp: 60 g, Rfl: 1    magnesium oxide (MAG-OX) 400 mg (241.3 mg magnesium) tablet, Take 400 mg by mouth once daily., Disp: , Rfl:     nystatin (MYCOSTATIN) cream, Apply topically 2 (two) times daily., Disp: 30 g, Rfl: 1    triamcinolone acetonide 0.025% (KENALOG) 0.025 % cream, Apply topically 2 (two) times daily., Disp: 80 g, Rfl: 1    ALPRAZolam (XANAX) 1 MG tablet, Take 1 tablet (1 mg total) by mouth nightly as needed for Anxiety., Disp: 90 tablet, Rfl: 1    atorvastatin (LIPITOR) 20 MG tablet, Take 1 tablet (20 mg total) by mouth once daily., Disp: 90 tablet, Rfl: 3    cyclobenzaprine (FLEXERIL) 10 MG tablet, Take 1 tablet (10 mg total) by mouth 3 (three) times daily as needed., Disp: 270 tablet, Rfl: 1    doxepin (SINEQUAN) 100 MG capsule, Take 1 capsule (100 mg total) by mouth every evening., Disp: 90 capsule, Rfl: 3    FLUoxetine 40 MG capsule, Take 1 capsule (40 mg total) by mouth once daily., Disp: 90 capsule, Rfl: 3    venlafaxine (EFFEXOR-XR) 75 MG 24 hr capsule, Take 1 capsule (75 mg total) by mouth once daily., Disp: 90 capsule, Rfl: 3    zolpidem (AMBIEN) 10 mg Tab, Take 1 tablet (10 mg total) by mouth every evening., Disp: 90 tablet, Rfl: 1    Review of Systems   Constitutional:  Negative for appetite change, fatigue, fever and unexpected weight change.   Respiratory:  Negative for cough, chest tightness, shortness of breath and wheezing.    Cardiovascular:  Negative for chest pain and leg swelling.   Gastrointestinal:  Negative for abdominal pain, constipation, nausea and vomiting.        -heartburn   Genitourinary:  Negative for difficulty urinating, dysuria, frequency and urgency.   Musculoskeletal:  Positive for arthralgias (right hip), back pain and neck pain. Negative for myalgias.        +bilateral groin pain   Skin:  Negative for rash.   Neurological:  Positive for numbness. Negative for  "dizziness, weakness and headaches.   Hematological:  Does not bruise/bleed easily.   Psychiatric/Behavioral:  Positive for dysphoric mood and sleep disturbance (due to pain). Negative for suicidal ideas. The patient is not nervous/anxious.    All other systems reviewed and are negative.       Objective:      Vitals:    07/07/23 1054   BP: 114/64   Pulse: 96   SpO2: 95%   Weight: 97.5 kg (215 lb)   Height: 5' 6" (1.676 m)     Wt Readings from Last 6 Encounters:   07/07/23 97.5 kg (215 lb)   02/06/23 94.3 kg (208 lb)   10/03/22 90.7 kg (200 lb)   06/03/22 89.4 kg (197 lb)   04/18/22 89.8 kg (198 lb)   02/15/22 92.1 kg (203 lb)             Physical Exam  Vitals reviewed.   Constitutional:       General: She is not in acute distress.     Appearance: Normal appearance. She is well-developed. She is obese.   HENT:      Head: Normocephalic and atraumatic.   Neck:      Thyroid: No thyromegaly.      Vascular: No carotid bruit.   Cardiovascular:      Rate and Rhythm: Normal rate and regular rhythm.      Heart sounds: Normal heart sounds. No murmur heard.    No friction rub.   Pulmonary:      Effort: Pulmonary effort is normal.      Breath sounds: Normal breath sounds. No wheezing or rales.   Abdominal:      General: Bowel sounds are normal. There is no distension.      Palpations: Abdomen is soft.      Tenderness: There is no abdominal tenderness.   Musculoskeletal:      Cervical back: Neck supple.      Lumbar back: Bony tenderness present. No swelling.      Right knee: Crepitus present. No swelling. Normal range of motion.      Left knee: Crepitus present. No swelling. Normal range of motion.        Legs:       Comments: Bilateral groin pain with radiation toward the back bilaterally. TTP L spine. No foot drop bilaterally. TTP anterior pelvis   Lymphadenopathy:      Cervical: No cervical adenopathy.   Skin:     General: Skin is warm and dry.      Findings: No rash.   Neurological:      Mental Status: She is alert and " oriented to person, place, and time.   Psychiatric:         Attention and Perception: She is attentive.         Speech: Speech normal.         Behavior: Behavior normal.         Thought Content: Thought content normal.         Judgment: Judgment normal.         Assessment:       1. Fall on same level from tripping    2. Bilateral groin pain    3. Paresthesia of foot, bilateral    4. Acute bilateral low back pain, unspecified whether sciatica present    5. Panic disorder without agoraphobia    6. Chronic recurrent major depressive disorder    7. Chronic insomnia    8. Attention deficit hyperactivity disorder (ADHD), predominantly inattentive type    9. Screening mammogram, encounter for    10. Menopause    11. Screening for osteoporosis    12. Medication refill           Plan:       Fall on same level from tripping  -     X-Ray Pelvis Complete min 3 views; Future; Expected date: 07/07/2023  -     X-Ray Lumbar Spine 5 View; Future; Expected date: 07/07/2023    Bilateral groin pain  Comments:  Likely strain, but will get Xray pelvis as tender in anteriorly in the pelvis  Orders:  -     X-Ray Pelvis Complete min 3 views; Future; Expected date: 07/07/2023  -     X-Ray Lumbar Spine 5 View; Future; Expected date: 07/07/2023    Paresthesia of foot, bilateral  -     X-Ray Lumbar Spine 5 View; Future; Expected date: 07/07/2023    Acute bilateral low back pain, unspecified whether sciatica present  Comments:  Acute. Will get xray of lower back due to paresthesias. May benefit from steroid pack  Orders:  -     cyclobenzaprine (FLEXERIL) 10 MG tablet; Take 1 tablet (10 mg total) by mouth 3 (three) times daily as needed.  Dispense: 270 tablet; Refill: 1  -     X-Ray Pelvis Complete min 3 views; Future; Expected date: 07/07/2023  -     X-Ray Lumbar Spine 5 View; Future; Expected date: 07/07/2023    Panic disorder without agoraphobia  Comments:  Stable.  Will continue to monitor for symptoms  Orders:  -     venlafaxine (EFFEXOR-XR)  75 MG 24 hr capsule; Take 1 capsule (75 mg total) by mouth once daily.  Dispense: 90 capsule; Refill: 3  -     ALPRAZolam (XANAX) 1 MG tablet; Take 1 tablet (1 mg total) by mouth nightly as needed for Anxiety.  Dispense: 90 tablet; Refill: 1    Chronic recurrent major depressive disorder  Comments:  Stable. Will continue to monitor symptoms on current mediation, prozac/effexor/buspar  Orders:  -     FLUoxetine 40 MG capsule; Take 1 capsule (40 mg total) by mouth once daily.  Dispense: 90 capsule; Refill: 3    Chronic insomnia  Comments:  Symptomatic due to pain. To continue on ambien/doxepin.  Will continue on monitor symptoms.  Orders:  -     doxepin (SINEQUAN) 100 MG capsule; Take 1 capsule (100 mg total) by mouth every evening.  Dispense: 90 capsule; Refill: 3  -     zolpidem (AMBIEN) 10 mg Tab; Take 1 tablet (10 mg total) by mouth every evening.  Dispense: 90 tablet; Refill: 1    Attention deficit hyperactivity disorder (ADHD), predominantly inattentive type  Comments:  Focus controlled. Will continue to monitor tolerability and effectiveness of Adderall    Screening mammogram, encounter for  Comments:  Order sent to [facility]  Orders:  -     Mammo Digital Screening Bilat w/ Edvin; Future; Expected date: 07/07/2023    Menopause  Comments:  DEXA order sent to [facility]  Orders:  -     DXA Bone Density Axial Skeleton 1 or more sites; Future; Expected date: 07/07/2023    Screening for osteoporosis  -     DXA Bone Density Axial Skeleton 1 or more sites; Future; Expected date: 07/07/2023    Medication refill  -     atorvastatin (LIPITOR) 20 MG tablet; Take 1 tablet (20 mg total) by mouth once daily.  Dispense: 90 tablet; Refill: 3  -     ALPRAZolam (XANAX) 1 MG tablet; Take 1 tablet (1 mg total) by mouth nightly as needed for Anxiety.  Dispense: 90 tablet; Refill: 1        Labs and/or tests have been ordered for the evaluation/monitoring of acute/chronic conditions, to be done just before next visit.    Other  Patient advised of  the importance of preventive testing, specifically mammogram, and that delay in testing could lead to delay in diagnosis, progressive disease, and/or premature death.    Follow up in about 3 months (around 10/7/2023) for Anxiety, Insomnia.        7/7/2023 Eron Hill

## 2023-07-13 ENCOUNTER — TELEPHONE (OUTPATIENT)
Dept: FAMILY MEDICINE | Facility: CLINIC | Age: 68
End: 2023-07-13

## 2023-07-13 NOTE — TELEPHONE ENCOUNTER
----- Message from Kylie Portillo sent at 7/13/2023 10:26 AM CDT -----  Regarding: removing order  We have made 3 attempts to contact this pt to  schedule their MAMMOGRAM & BONE DENSITY and have been unable to reach them therefore we are removing this from our work queue. We just wanted to make you aware of this in case you wanted to reach out to the patient.     Thanks,   [facility] Centralized Scheduling

## 2023-08-08 ENCOUNTER — HOSPITAL ENCOUNTER (OUTPATIENT)
Dept: RADIOLOGY | Facility: HOSPITAL | Age: 68
Discharge: HOME | End: 2023-08-08
Attending: FAMILY MEDICINE
Payer: MEDICARE

## 2023-08-08 DIAGNOSIS — R10.31 BILATERAL GROIN PAIN: ICD-10-CM
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
## 2023-08-08 DIAGNOSIS — R20.2 PARESTHESIA OF FOOT, BILATERAL: ICD-10-CM

## 2023-08-08 DIAGNOSIS — M54.50 ACUTE BILATERAL LOW BACK PAIN, UNSPECIFIED WHETHER SCIATICA PRESENT: ICD-10-CM

## 2023-08-08 DIAGNOSIS — Z13.820 SCREENING FOR OSTEOPOROSIS: ICD-10-CM

## 2023-08-08 DIAGNOSIS — R10.32 BILATERAL GROIN PAIN: ICD-10-CM

## 2023-08-08 DIAGNOSIS — Z78.0 MENOPAUSE: ICD-10-CM

## 2023-08-08 PROCEDURE — 72190 X-RAY EXAM OF PELVIS: CPT | Mod: TC,PO

## 2023-08-08 PROCEDURE — 77080 DXA BONE DENSITY AXIAL: CPT | Mod: TC,PO

## 2023-08-08 PROCEDURE — 72110 X-RAY EXAM L-2 SPINE 4/>VWS: CPT | Mod: TC,PO

## 2023-09-28 ENCOUNTER — TELEPHONE (OUTPATIENT)
Dept: FAMILY MEDICINE | Facility: CLINIC | Age: 68
End: 2023-09-28

## 2023-09-28 NOTE — TELEPHONE ENCOUNTER
----- Message from Belkys Maria sent at 9/28/2023 12:19 PM CDT -----  Pt needs to reschedule. She is currently out of town and wont be back until after October 4th.  [phone]

## 2024-01-03 ENCOUNTER — TELEPHONE (OUTPATIENT)
Dept: FAMILY MEDICINE | Facility: CLINIC | Age: 69
End: 2024-01-03
Payer: MEDICARE

## 2024-01-03 DIAGNOSIS — F41.0 PANIC DISORDER WITHOUT AGORAPHOBIA: ICD-10-CM

## 2024-01-03 DIAGNOSIS — Z76.0 MEDICATION REFILL: ICD-10-CM

## 2024-01-03 DIAGNOSIS — F51.04 CHRONIC INSOMNIA: ICD-10-CM

## 2024-01-03 RX ORDER — BUSPIRONE HYDROCHLORIDE 15 MG/1
15 TABLET ORAL 2 TIMES DAILY
Qty: 180 TABLET | Refills: 1 | Status: CANCELLED | OUTPATIENT
Start: 2024-01-03

## 2024-01-03 RX ORDER — ZOLPIDEM TARTRATE 10 MG/1
10 TABLET ORAL NIGHTLY
Qty: 90 TABLET | Refills: 1 | Status: CANCELLED | OUTPATIENT
Start: 2024-01-03

## 2024-01-03 NOTE — TELEPHONE ENCOUNTER
----- Message from Liz Ramos sent at 1/3/2024  2:29 PM CST -----  Refill Xanax, Ambien, and Buspirone. Please print the prescriptions. She goes to [facility] to fill these prescription. Please call when these are ready.  Pt's # [phone]

## 2024-01-04 RX ORDER — ALPRAZOLAM 1 MG/1
1 TABLET ORAL NIGHTLY PRN
Qty: 90 TABLET | Refills: 1 | Status: SHIPPED | OUTPATIENT
Start: 2024-01-04

## 2024-01-04 RX ORDER — BUSPIRONE HYDROCHLORIDE 15 MG/1
15 TABLET ORAL 2 TIMES DAILY
Qty: 180 TABLET | Refills: 1 | Status: SHIPPED | OUTPATIENT
Start: 2024-01-04

## 2024-01-04 RX ORDER — BUSPIRONE HYDROCHLORIDE 15 MG/1
15 TABLET ORAL 2 TIMES DAILY
Qty: 180 TABLET | Refills: 1 | Status: SHIPPED | OUTPATIENT
Start: 2024-01-04 | End: 2024-01-04

## 2024-01-04 RX ORDER — ZOLPIDEM TARTRATE 10 MG/1
10 TABLET ORAL NIGHTLY
Qty: 90 TABLET | Refills: 1 | Status: SHIPPED | OUTPATIENT
Start: 2024-01-04 | End: 2024-01-11 | Stop reason: SDUPTHER

## 2024-01-11 ENCOUNTER — OFFICE VISIT (OUTPATIENT)
Dept: FAMILY MEDICINE | Facility: CLINIC | Age: 69
End: 2024-01-11
Payer: MEDICARE

## 2024-01-11 VITALS
HEART RATE: 81 BPM | HEIGHT: 66 IN | SYSTOLIC BLOOD PRESSURE: 112 MMHG | WEIGHT: 203 LBS | DIASTOLIC BLOOD PRESSURE: 78 MMHG | BODY MASS INDEX: 32.62 KG/M2
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
## 2024-01-11 DIAGNOSIS — F33.9 CHRONIC RECURRENT MAJOR DEPRESSIVE DISORDER: ICD-10-CM

## 2024-01-11 DIAGNOSIS — F51.04 CHRONIC INSOMNIA: Primary | ICD-10-CM

## 2024-01-11 DIAGNOSIS — M25.551 ARTHRALGIA OF RIGHT HIP: ICD-10-CM

## 2024-01-11 DIAGNOSIS — F41.0 PANIC DISORDER WITHOUT AGORAPHOBIA: ICD-10-CM

## 2024-01-11 DIAGNOSIS — F90.0 ATTENTION DEFICIT HYPERACTIVITY DISORDER (ADHD), PREDOMINANTLY INATTENTIVE TYPE: ICD-10-CM

## 2024-01-11 DIAGNOSIS — Z79.899 LONG-TERM USE OF HIGH-RISK MEDICATION: ICD-10-CM

## 2024-01-11 PROCEDURE — 99214 OFFICE O/P EST MOD 30 MIN: CPT | Mod: S$GLB,,, | Performed by: FAMILY MEDICINE

## 2024-01-11 RX ORDER — HYDROCODONE BITARTRATE AND ACETAMINOPHEN 10; 325 MG/1; MG/1
1 TABLET ORAL EVERY 6 HOURS PRN
COMMUNITY
End: 2024-01-11 | Stop reason: SDUPTHER

## 2024-01-11 RX ORDER — ZOLPIDEM TARTRATE 10 MG/1
10 TABLET ORAL NIGHTLY
Qty: 90 TABLET | Refills: 1 | Status: SHIPPED | OUTPATIENT
Start: 2024-01-11

## 2024-01-11 RX ORDER — HYDROCODONE BITARTRATE AND ACETAMINOPHEN 10; 325 MG/1; MG/1
1 TABLET ORAL EVERY 6 HOURS PRN
Qty: 120 TABLET | Refills: 0 | Status: SHIPPED | OUTPATIENT
Start: 2024-01-11

## 2024-01-11 NOTE — PROGRESS NOTES
SUBJECTIVE:    Patient ID: Stephanie Knox is a 68 y.o. female.    Chief Complaint: Follow-up (4 mo f/u//no med bottles//declined mammogram//declined flu vac//tc)      Pt here to checkup on acute and chronic conditions.           Pt fell yesterday while pulling an ice chest up the steps of her house. Fell onto her left side onto the grass. Was able to get herself up. Doesn't have any bruises, just a bit stiff this morning. Doesn't think she is picking up her feet has high as she used to.     Does take occasional hydrocodone before she is going to go out and do something.  Can't take it too late at night [facility] Has chronic pain from across the back.     Anxiety and depression has been doing so-so. It was bad around [place] because she never did, and she was sick. Has been taking buspar and prozac.  Has panic attacks if she gets into a group and she can't see her .(Has done biofeedback and psychology in the past, and that doesn't seem to help;failed trintellix, abilify.) Continues to take adderall which helps with her focus.    Not been sleeping well even with ambien, but she is grieving over an old friend. Restless, trying to get pain. (failed trazodone, nightmares)      Remains smoke free.  Did have one over the summer when her mother .  Has trying to walk a little. Has not been    Has lost some weight.    No recent labs.  ---------------------------------------------------------------------  Pt had cscope 2022, no polyp (Kashif) to repeat 3 years.   Mammogram 3/2021, due, already has order.  Will do when she doesn't think she will go and not have a panic attack.  DEXA ordered.  Eye exam UTD at [facility]  Normal PVD screen.                Past Medical History:   Diagnosis Date    ADHD (attention deficit hyperactivity disorder)     Anxiety     Arthritis     Colon polyps     Depression     Hyperlipidemia     Insomnia     Memory difficulties      Social History      Socioeconomic History    Marital status:    Tobacco Use    Smoking status: Former     Current packs/day: 0.25     Types: Cigarettes    Smokeless tobacco: Never   Substance and Sexual Activity    Alcohol use: Not Currently    Drug use: Never     Social Determinants of Health     Financial Resource Strain: Low Risk  (7/5/2023)    Overall Financial Resource Strain (CARDIA)     Difficulty of Paying Living Expenses: Not very hard   Food Insecurity: No Food Insecurity (7/5/2023)    Hunger Vital Sign     Worried About Running Out of Food in the Last Year: Never true     Ran Out of Food in the Last Year: Never true   Transportation Needs: No Transportation Needs (7/5/2023)    PRAPARE - Transportation     Lack of Transportation (Medical): No     Lack of Transportation (Non-Medical): No   Physical Activity: Insufficiently Active (7/5/2023)    Exercise Vital Sign     Days of Exercise per Week: 4 days     Minutes of Exercise per Session: 30 min   Stress: Stress Concern Present (7/5/2023)    [employer] [place] [employer] - Occupational Stress Questionnaire     Feeling of Stress : Very much   Social Connections: Unknown (7/5/2023)    Social Connection and Isolation Panel [NHANES]     Frequency of Communication with Friends and Family: Three times a week     Frequency of Social Gatherings with Friends and Family: Patient declined     Active Member of Clubs or Organizations: Yes     Attends Club or Organization Meetings: More than 4 times per year     Marital Status:    Housing Stability: Low Risk  (7/5/2023)    Housing Stability Vital Sign     Unable to Pay for Housing in the Last Year: No     Number of Places Lived in the Last Year: 1     Unstable Housing in the Last Year: No     Past Surgical History:   Procedure Laterality Date    TONSILLECTOMY       Family History   Problem Relation Age of Onset    Emphysema Mother     Diabetes Father     Breast cancer Maternal Aunt        Review of patient's  allergies indicates:  No Known Allergies    Current Outpatient Medications:     ALPRAZolam (XANAX) 1 MG tablet, Take 1 tablet (1 mg total) by mouth nightly as needed for Anxiety., Disp: 90 tablet, Rfl: 1    atorvastatin (LIPITOR) 20 MG tablet, Take 1 tablet (20 mg total) by mouth once daily., Disp: 90 tablet, Rfl: 3    busPIRone (BUSPAR) 15 MG tablet, Take 1 tablet (15 mg total) by mouth 2 (two) times daily., Disp: 180 tablet, Rfl: 1    cyclobenzaprine (FLEXERIL) 10 MG tablet, Take 1 tablet (10 mg total) by mouth 3 (three) times daily as needed., Disp: 270 tablet, Rfl: 1    dextroamphetamine-amphetamine (ADDERALL) 20 mg tablet, Take 1 tablet by mouth once daily., Disp: 90 tablet, Rfl: 0    doxepin (SINEQUAN) 100 MG capsule, Take 1 capsule (100 mg total) by mouth every evening., Disp: 90 capsule, Rfl: 3    estradioL (ESTRACE) 0.01 % (0.1 mg/gram) vaginal cream, Place 4 g vaginally twice a week., Disp: 3 each, Rfl: 1    FLUoxetine 40 MG capsule, Take 1 capsule (40 mg total) by mouth once daily., Disp: 90 capsule, Rfl: 3    magnesium oxide (MAG-OX) 400 mg (241.3 mg magnesium) tablet, Take 400 mg by mouth once daily., Disp: , Rfl:     nystatin (MYCOSTATIN) cream, Apply topically 2 (two) times daily., Disp: 30 g, Rfl: 1    triamcinolone acetonide 0.025% (KENALOG) 0.025 % cream, Apply topically 2 (two) times daily., Disp: 80 g, Rfl: 1    venlafaxine (EFFEXOR-XR) 75 MG 24 hr capsule, Take 1 capsule (75 mg total) by mouth once daily., Disp: 90 capsule, Rfl: 3    HYDROcodone-acetaminophen (NORCO)  mg per tablet, Take 1 tablet by mouth every 6 (six) hours as needed for Pain., Disp: 120 tablet, Rfl: 0    zolpidem (AMBIEN) 10 mg Tab, Take 1 tablet (10 mg total) by mouth every evening., Disp: 90 tablet, Rfl: 1    Review of Systems   Constitutional:  Negative for appetite change, fatigue, fever and unexpected weight change.   Respiratory:  Negative for cough, chest tightness, shortness of breath and wheezing.   "  Cardiovascular:  Negative for chest pain and leg swelling.   Gastrointestinal:  Negative for abdominal pain, constipation, nausea and vomiting.        -heartburn   Genitourinary:  Negative for difficulty urinating, dysuria, frequency and urgency.   Musculoskeletal:  Positive for arthralgias (right hip), back pain and neck pain. Negative for myalgias.        +bilateral groin pain   Skin:  Negative for rash.   Neurological:  Positive for numbness. Negative for dizziness, weakness and headaches.   Hematological:  Does not bruise/bleed easily.   Psychiatric/Behavioral:  Positive for dysphoric mood and sleep disturbance (due to pain). Negative for suicidal ideas. The patient is not nervous/anxious.    All other systems reviewed and are negative.         Objective:      Vitals:    01/11/24 0821   BP: 112/78   Pulse: 81   Weight: 92.1 kg (203 lb)   Height: 5' 6" (1.676 m)       Wt Readings from Last 6 Encounters:   01/11/24 92.1 kg (203 lb)   07/07/23 97.5 kg (215 lb)   02/06/23 94.3 kg (208 lb)   10/03/22 90.7 kg (200 lb)   06/03/22 89.4 kg (197 lb)   04/18/22 89.8 kg (198 lb)             Physical Exam  Vitals reviewed.   Constitutional:       General: She is not in acute distress.     Appearance: Normal appearance. She is well-developed. She is obese.   HENT:      Head: Normocephalic and atraumatic.   Neck:      Thyroid: No thyromegaly.      Vascular: No carotid bruit.   Cardiovascular:      Rate and Rhythm: Normal rate and regular rhythm.      Heart sounds: Normal heart sounds. No murmur heard.     No friction rub.   Pulmonary:      Effort: Pulmonary effort is normal.      Breath sounds: Normal breath sounds. No wheezing or rales.   Abdominal:      General: Bowel sounds are normal. There is no distension.      Palpations: Abdomen is soft.      Tenderness: There is no abdominal tenderness.   Musculoskeletal:      Cervical back: Neck supple.      Lumbar back: Bony tenderness present. No swelling.      Right knee: " Crepitus present. No swelling. Normal range of motion.      Left knee: Crepitus present. No swelling. Normal range of motion.        Legs:       Comments: Bilateral groin pain with radiation toward the back bilaterally. TTP L spine. No foot drop bilaterally. TTP anterior pelvis   Lymphadenopathy:      Cervical: No cervical adenopathy.   Skin:     General: Skin is warm and dry.      Findings: No rash.   Neurological:      Mental Status: She is alert and oriented to person, place, and time.   Psychiatric:         Attention and Perception: She is attentive.         Speech: Speech normal.         Behavior: Behavior normal.         Thought Content: Thought content normal.         Judgment: Judgment normal.           Assessment:       1. Chronic insomnia    2. Panic disorder without agoraphobia    3. Attention deficit hyperactivity disorder (ADHD), predominantly inattentive type    4. Chronic recurrent major depressive disorder    5. Fall on same level from tripping    6. Long-term use of high-risk medication    7. Arthralgia of right hip             Plan:       Chronic insomnia  Comments:  Symptomatic due to pain. To continue on ambien/doxepin.  Will continue on monitor symptoms.  Orders:  -     zolpidem (AMBIEN) 10 mg Tab; Take 1 tablet (10 mg total) by mouth every evening.  Dispense: 90 tablet; Refill: 1    Panic disorder without agoraphobia  Comments:  Controlled. Will continue to monitor symptoms    Attention deficit hyperactivity disorder (ADHD), predominantly inattentive type  Comments:  Controlled. Will continue to monitor effectiveness of Adderall    Chronic recurrent major depressive disorder  Comments:  Stable. Will continue to monitor symptoms    Fall on same level from tripping    Long-term use of high-risk medication  -     TSH w/reflex to FT4; Future; Expected date: 01/11/2024  -     Urinalysis, Reflex to Urine Culture Urine, Clean Catch; Future; Expected date: 01/11/2024  -     CBC Auto Differential;  Future; Expected date: 01/11/2024  -     Lipid Panel; Future; Expected date: 01/11/2024  -     Comprehensive Metabolic Panel; Future; Expected date: 01/11/2024    Arthralgia of right hip  -     HYDROcodone-acetaminophen (NORCO)  mg per tablet; Take 1 tablet by mouth every 6 (six) hours as needed for Pain.  Dispense: 120 tablet; Refill: 0          Labs and/or tests have been ordered for the evaluation/monitoring of acute/chronic conditions, to be done just before next visit.    Other  Patient advised of the importance of preventive testing, specifically mammogram, and that delay in testing could lead to delay in diagnosis, progressive disease, and/or premature death.    No follow-ups on file.        1/11/2024 Eron Hill

## 2024-03-28 ENCOUNTER — PATIENT MESSAGE (OUTPATIENT)
Dept: ADMINISTRATIVE | Facility: HOSPITAL | Age: 69
End: 2024-03-28
Payer: MEDICARE

## 2024-04-19 ENCOUNTER — PATIENT MESSAGE (OUTPATIENT)
Dept: ADMINISTRATIVE | Facility: HOSPITAL | Age: 69
End: 2024-04-19
Payer: MEDICARE

## 2024-05-09 DIAGNOSIS — R39.9 UTI SYMPTOMS: Primary | ICD-10-CM

## 2024-05-09 LAB
ALBUMIN SERPL-MCNC: 4.1 G/DL (ref 3.6–5.1)
ALBUMIN/GLOB SERPL: 1.2 (CALC) (ref 1–2.5)
ALP SERPL-CCNC: 234 U/L (ref 37–153)
ALT SERPL-CCNC: 21 U/L (ref 6–29)
APPEARANCE UR: CLEAR
AST SERPL-CCNC: 40 U/L (ref 10–35)
BACTERIA #/AREA URNS HPF: ABNORMAL /HPF
BACTERIA UR CULT: ABNORMAL
BACTERIA UR CULT: ABNORMAL
BASOPHILS # BLD AUTO: 152 CELLS/UL (ref 0–200)
BASOPHILS NFR BLD AUTO: 1.5 %
BILIRUB SERPL-MCNC: 0.7 MG/DL (ref 0.2–1.2)
BILIRUB UR QL STRIP: NEGATIVE
BUN SERPL-MCNC: 15 MG/DL (ref 7–25)
BUN/CREAT SERPL: 13 (CALC) (ref 6–22)
CALCIUM SERPL-MCNC: 9.6 MG/DL (ref 8.6–10.4)
CHLORIDE SERPL-SCNC: 106 MMOL/L (ref 98–110)
CHOLEST SERPL-MCNC: 159 MG/DL
CHOLEST/HDLC SERPL: 3.1 (CALC)
CO2 SERPL-SCNC: 21 MMOL/L (ref 20–32)
COLOR UR: ABNORMAL
CREAT SERPL-MCNC: 1.18 MG/DL (ref 0.5–1.05)
EGFR: 51 ML/MIN/1.73M2
EOSINOPHIL # BLD AUTO: 374 CELLS/UL (ref 15–500)
EOSINOPHIL NFR BLD AUTO: 3.7 %
ERYTHROCYTE [DISTWIDTH] IN BLOOD BY AUTOMATED COUNT: 15.9 % (ref 11–15)
GLOBULIN SER CALC-MCNC: 3.3 G/DL (CALC) (ref 1.9–3.7)
GLUCOSE SERPL-MCNC: 102 MG/DL (ref 65–99)
GLUCOSE UR QL STRIP: NEGATIVE
HCT VFR BLD AUTO: 41.1 % (ref 35–45)
HDLC SERPL-MCNC: 52 MG/DL
HGB BLD-MCNC: 12.7 G/DL (ref 11.7–15.5)
HGB UR QL STRIP: NEGATIVE
HYALINE CASTS #/AREA URNS LPF: ABNORMAL /LPF
KETONES UR QL STRIP: ABNORMAL
LDLC SERPL CALC-MCNC: 79 MG/DL (CALC)
LEUKOCYTE ESTERASE UR QL STRIP: ABNORMAL
LYMPHOCYTES # BLD AUTO: 3171 CELLS/UL (ref 850–3900)
LYMPHOCYTES NFR BLD AUTO: 31.4 %
MCH RBC QN AUTO: 25.8 PG (ref 27–33)
MCHC RBC AUTO-ENTMCNC: 30.9 G/DL (ref 32–36)
MCV RBC AUTO: 83.5 FL (ref 80–100)
MONOCYTES # BLD AUTO: 879 CELLS/UL (ref 200–950)
MONOCYTES NFR BLD AUTO: 8.7 %
NEUTROPHILS # BLD AUTO: 5525 CELLS/UL (ref 1500–7800)
NEUTROPHILS NFR BLD AUTO: 54.7 %
NITRITE UR QL STRIP: POSITIVE
NONHDLC SERPL-MCNC: 107 MG/DL (CALC)
PH UR STRIP: 5.5 [PH] (ref 5–8)
PLATELET # BLD AUTO: 356 THOUSAND/UL (ref 140–400)
PMV BLD REES-ECKER: 9.1 FL (ref 7.5–12.5)
POTASSIUM SERPL-SCNC: 4.5 MMOL/L (ref 3.5–5.3)
PROT SERPL-MCNC: 7.4 G/DL (ref 6.1–8.1)
PROT UR QL STRIP: ABNORMAL
RBC # BLD AUTO: 4.92 MILLION/UL (ref 3.8–5.1)
RBC #/AREA URNS HPF: ABNORMAL /HPF
SERVICE CMNT-IMP: ABNORMAL
SODIUM SERPL-SCNC: 141 MMOL/L (ref 135–146)
SP GR UR STRIP: 1.03 (ref 1–1.03)
SQUAMOUS #/AREA URNS HPF: ABNORMAL /HPF
TRIGL SERPL-MCNC: 182 MG/DL
TSH SERPL-ACNC: 2.16 MIU/L (ref 0.4–4.5)
WBC # BLD AUTO: 10.1 THOUSAND/UL (ref 3.8–10.8)
WBC #/AREA URNS HPF: ABNORMAL /HPF

## 2024-05-09 NOTE — TELEPHONE ENCOUNTER
----- Message from Mary Carrera MA sent at 5/9/2024  8:33 AM CDT -----  eGFR = 51  AST = 40  + UTI on culture, klebsiella pneumoniae

## 2024-05-09 NOTE — TELEPHONE ENCOUNTER
Spoke with pt states she does have UTI symptoms. She is leaving to go out of town. She will call back tomorrow with a pharmacy to send antibiotic to

## 2024-05-10 RX ORDER — CEFUROXIME AXETIL 500 MG/1
500 TABLET ORAL 2 TIMES DAILY
Qty: 10 TABLET | Refills: 0 | Status: SHIPPED | OUTPATIENT
Start: 2024-05-10

## 2024-05-10 NOTE — TELEPHONE ENCOUNTER
The patient's prescription has been approved and sent to [id number] - [place] - [facility]   [address]  [place] [address]  Phone: [phone] Fax: [phone]

## 2024-05-10 NOTE — TELEPHONE ENCOUNTER
----- Message from Liz Ramos sent at 5/10/2024  8:59 AM CDT -----  Please send her antibiotic to  [facility] in [place] [facility]'s # [phone] pt's # [phone] GH

## 2024-05-21 NOTE — PROGRESS NOTES
SUBJECTIVE:    Patient ID: Stephanie Knox is a 68 y.o. female.    Chief Complaint: Follow-up (3 mo f/u//brought med bottles//complains of rash on chest, arms and legs at night//complains of numbness in hands and legs and feet at night//last dose hydrocodone 2 days ago, last dose xanax 2 mo ago//declined mammogram//tc)      Pt here to checkup on acute and chronic conditions.       Does take occasional hydrocodone before she is going to go out and do something.  Can't take it too late at night because it will keep her up at [facility]. Has chronic pain from across the back.     Has been doing ok. Has been visiting family and going to graduations.  Anxiety and depression has been doing so-so. Has been taking buspar and prozac.  Has panic attacks if she gets into a group and she can't see her .(Has done biofeedback and psychology in the past, and that doesn't seem to help;failed trintellix, abilify.) Continues to take adderall which helps with her focus.    Still not sleeping well even with ambien. Gets about 3-4 hours. (failed trazodone, nightmares)    Her incontinence has gotten so bad to the point where she has to wear a diaper. This started in January.     Remains smoke free.        Had labs done: fBS 102, TSH 2.16, LDL 79, GFR 51, Alk phos 234, AST 40  Has been treated for UTI since her labs were done. Still having symptoms, but always has pain down there.   ---------------------------------------------------------------------  Pt had cscope 9/2022, no polyp (Kashif) to repeat 3 years.   Mammogram 3/2021, due, already has order.  Will do when she doesn't think she will go and have a panic attack.  DEXA ordered.  Eye exam UTD at [facility]  Normal PVD screen.          Office Visit on 01/11/2024   Component Date Value Ref Range Status    TSH w/reflex to FT4 05/06/2024 2.16  0.40 - 4.50 mIU/L Final    WBC 05/06/2024 10.1  3.8 - 10.8 Thousand/uL Final    RBC 05/06/2024 4.92  3.80 - 5.10 Million/uL Final     Hemoglobin 05/06/2024 12.7  11.7 - 15.5 g/dL Final    Hematocrit 05/06/2024 41.1  35.0 - 45.0 % Final    MCV 05/06/2024 83.5  80.0 - 100.0 fL Final    MCH 05/06/2024 25.8 (L)  27.0 - 33.0 pg Final    MCHC 05/06/2024 30.9 (L)  32.0 - 36.0 g/dL Final    RDW 05/06/2024 15.9 (H)  11.0 - 15.0 % Final    Platelets 05/06/2024 356  140 - 400 Thousand/uL Final    MPV 05/06/2024 9.1  7.5 - 12.5 fL Final    Neutrophils, Abs 05/06/2024 5,525  1,500 - 7,800 cells/uL Final    Lymph # 05/06/2024 3,171  850 - 3,900 cells/uL Final    Mono # 05/06/2024 879  200 - 950 cells/uL Final    Eos # 05/06/2024 374  15 - 500 cells/uL Final    Baso # 05/06/2024 152  0 - 200 cells/uL Final    Neutrophils Relative 05/06/2024 54.7  % Final    Lymph % 05/06/2024 31.4  % Final    Mono % 05/06/2024 8.7  % Final    Eosinophil % 05/06/2024 3.7  % Final    Basophil % 05/06/2024 1.5  % Final    Cholesterol 05/06/2024 159  <200 mg/dL Final    HDL 05/06/2024 52  > OR = 50 mg/dL Final    Triglycerides 05/06/2024 182 (H)  <150 mg/dL Final    LDL Cholesterol 05/06/2024 79  mg/dL (calc) Final    Comment: Reference range: <100     Desirable range <100 mg/dL for primary prevention;    <70 mg/dL for patients with CHD or diabetic patients   with > or = 2 CHD risk factors.     LDL-C is now calculated using the Naveen-Tito   calculation, which is a validated novel method providing   better accuracy than the Friedewald equation in the   estimation of LDL-C.   Naveen TERRY et al. ZAC. 2013;310(19): 5513-2421   (http://education.Ambarella.Geogoer/faq/MEA554)      HDL/Cholesterol Ratio 05/06/2024 3.1  <5.0 (calc) Final    Non HDL Chol. (LDL+VLDL) 05/06/2024 107  <130 mg/dL (calc) Final    Comment: For patients with diabetes plus 1 major ASCVD risk   factor, treating to a non-HDL-C goal of <100 mg/dL   (LDL-C of <70 mg/dL) is considered a therapeutic   option.      Glucose 05/06/2024 102 (H)  65 - 99 mg/dL Final    Comment:               Fasting reference interval      For someone without known diabetes, a glucose value  between 100 and 125 mg/dL is consistent with  prediabetes and should be confirmed with a  follow-up test.         BUN 05/06/2024 15  7 - 25 mg/dL Final    Creatinine 05/06/2024 1.18 (H)  0.50 - 1.05 mg/dL Final    eGFR 05/06/2024 51 (L)  > OR = 60 mL/min/1.73m2 Final    BUN/Creatinine Ratio 05/06/2024 13  6 - 22 (calc) Final    Sodium 05/06/2024 141  135 - 146 mmol/L Final    Potassium 05/06/2024 4.5  3.5 - 5.3 mmol/L Final    Chloride 05/06/2024 106  98 - 110 mmol/L Final    CO2 05/06/2024 21  20 - 32 mmol/L Final    Calcium 05/06/2024 9.6  8.6 - 10.4 mg/dL Final    Total Protein 05/06/2024 7.4  6.1 - 8.1 g/dL Final    Albumin 05/06/2024 4.1  3.6 - 5.1 g/dL Final    Globulin, Total 05/06/2024 3.3  1.9 - 3.7 g/dL (calc) Final    Albumin/Globulin Ratio 05/06/2024 1.2  1.0 - 2.5 (calc) Final    Total Bilirubin 05/06/2024 0.7  0.2 - 1.2 mg/dL Final    Alkaline Phosphatase 05/06/2024 234 (H)  37 - 153 U/L Final    AST 05/06/2024 40 (H)  10 - 35 U/L Final    ALT 05/06/2024 21  6 - 29 U/L Final    Color, UA 05/06/2024 DARK YELLOW  YELLOW Final    Appearance, UA 05/06/2024 CLEAR  CLEAR Final    Specific Gravity, UA 05/06/2024 1.027  1.001 - 1.035 Final    pH, UA 05/06/2024 5.5  5.0 - 8.0 Final    Glucose, UA 05/06/2024 NEGATIVE  NEGATIVE Final    Bilirubin, UA 05/06/2024 NEGATIVE  NEGATIVE Final    Ketones, UA 05/06/2024 TRACE (A)  NEGATIVE Final    Occult Blood UA 05/06/2024 NEGATIVE  NEGATIVE Final    Protein, UA 05/06/2024 TRACE (A)  NEGATIVE Final    Nitrite, UA 05/06/2024 POSITIVE (A)  NEGATIVE Final    Leukocytes, UA 05/06/2024 1+ (A)  NEGATIVE Final    WBC Casts, UA 05/06/2024 20-40 (A)  < OR = 5 /HPF Final    RBC Casts, UA 05/06/2024 0-2  < OR = 2 /HPF Final    Squam Epithel, UA 05/06/2024 6-10 (A)  < OR = 5 /HPF Final    Bacteria, UA 05/06/2024 NONE SEEN  NONE SEEN /HPF Final    Hyaline Casts, UA 05/06/2024 NONE SEEN  NONE SEEN /LPF Final    Service Cmt:  05/06/2024    Final    Comment: This urine was analyzed for the presence of WBC,   RBC, bacteria, casts, and other formed elements.   Only those elements seen were reported.            Reflexive Urine Culture 05/06/2024    Final    CULTURE INDICATED - RESULTS TO FOLLOW    Urine Culture, Routine 05/06/2024  (A)   Final    Comment:     CULTURE, URINE, ROUTINE         Micro Number:      [id number]    Test Status:       Final    Specimen Source:   Urine    Specimen Quality:  Adequate    Result:            Greater than 100,000 CFU/mL of Klebsiella pneumoniae      COMMENT:           Additional non-predominating organism(s) isolated.                       These organisms, commonly found on external and                       internal genitalia, are considered colonizers. No                       further testing performed.                              K.pneumoniae                            ----------------                            INT   LIZETTE     AMOX/CLAVULANATE       S     <=2     AMPICILLIN             R     16     AMP/SULBACTAM          S     <=2     CEFAZOLIN              NR    <=4 **2     CEFEPIME               S     <=1     CEFTAZIDIME            S     <=1     CEFTRIAXONE            S     <=1     CIPROFLOXACIN          S     <=0.25     GENTAMICIN             S     <=1     IMIPENEM               S     <=0.25     LE                           VOFLOXACIN           S     <=0.12     NITROFURANTOIN         S     <=16     PIP/TAZOBACTAM         S     <=4     TOBRAMYCIN             S     <=1     TRIMETHOPRIM/SULFA     S     <=20    S=Susceptible  I=Intermediate  R=Resistant  * = Not Tested  NR = Not Reported  **NN = See Therapy Comments      THERAPY COMMENTS        Note 1:      For infections other than uncomplicated UTI      caused by E. coli, K. pneumoniae or P. mirabilis:      Cefazolin is resistant if LIZETTE > or = 8 mcg/mL.      (Distinguishing susceptible versus intermediate      for isolates with LIZETTE < or = 4 mcg/mL  requires      additional testing.)        Note 2:      For uncomplicated UTI caused by E. coli,      K. pneumoniae or P. mirabilis: Cefazolin is      susceptible if LIZETTE <32 mcg/mL and predicts      susceptible to the oral agents cefaclor, cefdinir,      cefpodoxime, cefprozil, cefuroxime, cephalexin      and loracarbef.               Past Medical History:   Diagnosis Date    ADHD (attention deficit hyperactivity disorder)     Anxiety     Arthritis     Colon polyps     Depression     Hyperlipidemia     Insomnia     Memory difficulties      Social History     Socioeconomic History    Marital status:    Tobacco Use    Smoking status: Former     Current packs/day: 0.25     Types: Cigarettes    Smokeless tobacco: Never   Substance and Sexual Activity    Alcohol use: Not Currently    Drug use: Never     Social Determinants of Health     Financial Resource Strain: Low Risk  (7/5/2023)    Overall Financial Resource Strain (CARDIA)     Difficulty of Paying Living Expenses: Not very hard   Food Insecurity: No Food Insecurity (7/5/2023)    Hunger Vital Sign     Worried About Running Out of Food in the Last Year: Never true     Ran Out of Food in the Last Year: Never true   Transportation Needs: No Transportation Needs (7/5/2023)    PRAPARE - Transportation     Lack of Transportation (Medical): No     Lack of Transportation (Non-Medical): No   Physical Activity: Unknown (7/5/2023)    Exercise Vital Sign     Days of Exercise per Week: 4 days   Recent Concern: Physical Activity - Insufficiently Active (7/5/2023)    Exercise Vital Sign     Days of Exercise per Week: 4 days     Minutes of Exercise per Session: 30 min   Stress: Stress Concern Present (7/5/2023)    [place] [employer] - Occupational Stress Questionnaire     Feeling of Stress : Very much   Housing Stability: Low Risk  (7/5/2023)    Housing Stability Vital Sign     Unable to Pay for Housing in the Last Year: No     Number of Places Lived in  the Last Year: 1     Unstable Housing in the Last Year: No     Past Surgical History:   Procedure Laterality Date    TONSILLECTOMY       Family History   Problem Relation Name Age of Onset    Emphysema Mother      Diabetes Father      Breast cancer Maternal Aunt         Review of patient's allergies indicates:  No Known Allergies    Current Outpatient Medications:     ALPRAZolam (XANAX) 1 MG tablet, Take 1 tablet (1 mg total) by mouth nightly as needed for Anxiety., Disp: 90 tablet, Rfl: 1    cefUROXime (CEFTIN) 500 MG tablet, Take 1 tablet (500 mg total) by mouth 2 (two) times daily., Disp: 10 tablet, Rfl: 0    dextroamphetamine-amphetamine (ADDERALL) 20 mg tablet, Take 1 tablet by mouth once daily., Disp: 90 tablet, Rfl: 0    doxepin (SINEQUAN) 100 MG capsule, Take 1 capsule (100 mg total) by mouth every evening., Disp: 90 capsule, Rfl: 3    HYDROcodone-acetaminophen (NORCO)  mg per tablet, Take 1 tablet by mouth every 6 (six) hours as needed for Pain., Disp: 120 tablet, Rfl: 0    nystatin (MYCOSTATIN) cream, Apply topically 2 (two) times daily., Disp: 30 g, Rfl: 1    triamcinolone acetonide 0.025% (KENALOG) 0.025 % cream, Apply topically 2 (two) times daily., Disp: 80 g, Rfl: 1    atorvastatin (LIPITOR) 20 MG tablet, Take 1 tablet (20 mg total) by mouth once daily., Disp: 90 tablet, Rfl: 3    busPIRone (BUSPAR) 15 MG tablet, Take 1 tablet (15 mg total) by mouth 2 (two) times daily., Disp: 180 tablet, Rfl: 1    cyclobenzaprine (FLEXERIL) 10 MG tablet, Take 1 tablet (10 mg total) by mouth 3 (three) times daily as needed., Disp: 270 tablet, Rfl: 1    estradioL (ESTRACE) 0.01 % (0.1 mg/gram) vaginal cream, Place 4 g vaginally twice a week. (Patient not taking: Reported on 5/22/2024), Disp: 3 each, Rfl: 1    FLUoxetine 40 MG capsule, Take 1 capsule (40 mg total) by mouth once daily., Disp: 90 capsule, Rfl: 3    magnesium oxide (MAG-OX) 400 mg (241.3 mg magnesium) tablet, Take 400 mg by mouth once daily. (Patient  "not taking: Reported on 5/22/2024), Disp: , Rfl:     solifenacin (VESICARE) 10 MG tablet, Take 1 tablet (10 mg total) by mouth once daily., Disp: 90 tablet, Rfl: 1    venlafaxine (EFFEXOR-XR) 75 MG 24 hr capsule, Take 1 capsule (75 mg total) by mouth once daily., Disp: 90 capsule, Rfl: 3    zolpidem (AMBIEN) 10 mg Tab, Take 1 tablet (10 mg total) by mouth every evening., Disp: 90 tablet, Rfl: 1    Review of Systems   Constitutional:  Negative for appetite change, fatigue, fever and unexpected weight change.   Respiratory:  Negative for cough, chest tightness, shortness of breath and wheezing.    Cardiovascular:  Negative for chest pain and leg swelling.   Gastrointestinal:  Negative for abdominal pain, constipation, nausea and vomiting.        -heartburn   Genitourinary:  Negative for difficulty urinating, dysuria, frequency and urgency.   Musculoskeletal:  Positive for arthralgias (right hip), back pain and neck pain. Negative for myalgias.        +bilateral groin pain   Skin:  Negative for rash.   Neurological:  Positive for numbness. Negative for dizziness, weakness and headaches.   Hematological:  Does not bruise/bleed easily.   Psychiatric/Behavioral:  Positive for dysphoric mood and sleep disturbance (due to pain). Negative for suicidal ideas. The patient is not nervous/anxious.    All other systems reviewed and are negative.         Objective:      Vitals:    05/22/24 1013   BP: 124/76   Pulse: 84   Weight: 95.3 kg (210 lb)   Height: 5' 6" (1.676 m)         Wt Readings from Last 6 Encounters:   05/22/24 95.3 kg (210 lb)   01/11/24 92.1 kg (203 lb)   07/07/23 97.5 kg (215 lb)   02/06/23 94.3 kg (208 lb)   10/03/22 90.7 kg (200 lb)   06/03/22 89.4 kg (197 lb)             Physical Exam  Vitals reviewed.   Constitutional:       General: She is not in acute distress.     Appearance: She is well-developed.   HENT:      Head: Normocephalic and atraumatic.   Neck:      Thyroid: No thyromegaly.   Cardiovascular:      " Rate and Rhythm: Normal rate and regular rhythm.      Heart sounds: Normal heart sounds. No murmur heard.     No friction rub.   Pulmonary:      Effort: Pulmonary effort is normal.      Breath sounds: Normal breath sounds. No wheezing or rales.   Abdominal:      General: Bowel sounds are normal. There is no distension.      Palpations: Abdomen is soft.      Tenderness: There is no abdominal tenderness.   Musculoskeletal:      Cervical back: Neck supple.   Lymphadenopathy:      Cervical: No cervical adenopathy.   Skin:     General: Skin is warm and dry.      Findings: No rash.   Neurological:      Mental Status: She is alert and oriented to person, place, and time.   Psychiatric:         Attention and Perception: She is attentive.         Speech: Speech normal.         Behavior: Behavior normal.         Thought Content: Thought content normal.         Judgment: Judgment normal.           Assessment:       1. Panic disorder without agoraphobia    2. Chronic insomnia    3. Chronic recurrent major depressive disorder    4. Postural urinary incontinence    5. Chronic bilateral low back pain with bilateral sciatica    6. Medication refill    7. Dysuria               Plan:       Panic disorder without agoraphobia  Comments:  Controlled.  Will continue to monitor for symptoms  Orders:  -     busPIRone (BUSPAR) 15 MG tablet; Take 1 tablet (15 mg total) by mouth 2 (two) times daily.  Dispense: 180 tablet; Refill: 1  -     venlafaxine (EFFEXOR-XR) 75 MG 24 hr capsule; Take 1 capsule (75 mg total) by mouth once daily.  Dispense: 90 capsule; Refill: 3    Chronic insomnia  Comments:  Symptomatic due to pain. To continue on ambien/doxepin.  Will continue on monitor symptoms.  Orders:  -     zolpidem (AMBIEN) 10 mg Tab; Take 1 tablet (10 mg total) by mouth every evening.  Dispense: 90 tablet; Refill: 1    Chronic recurrent major depressive disorder  Comments:  Stable. Will continue to monitor symptoms on current mediation,  prozac/effexor/buspar  Orders:  -     FLUoxetine 40 MG capsule; Take 1 capsule (40 mg total) by mouth once daily.  Dispense: 90 capsule; Refill: 3    Postural urinary incontinence  Comments:  Symptomatic. Will try on vesicare.  Orders:  -     solifenacin (VESICARE) 10 MG tablet; Take 1 tablet (10 mg total) by mouth once daily.  Dispense: 90 tablet; Refill: 1    Chronic bilateral low back pain with bilateral sciatica  Comments:  Chronic. Will continue to monitor symptoms  Orders:  -     cyclobenzaprine (FLEXERIL) 10 MG tablet; Take 1 tablet (10 mg total) by mouth 3 (three) times daily as needed.  Dispense: 270 tablet; Refill: 1    Medication refill  -     atorvastatin (LIPITOR) 20 MG tablet; Take 1 tablet (20 mg total) by mouth once daily.  Dispense: 90 tablet; Refill: 3    Dysuria  -     CULTURE, URINE; Future; Expected date: 05/22/2024        Other  Lab results discussed and reviewed with patient.  Labs and/or tests have been ordered for the evaluation/monitoring of acute/chronic conditions, to be done just before next visit.    Other  Patient advised of the importance of preventive testing, specifically mammogram, and that delay in testing could lead to delay in diagnosis, progressive disease, and/or premature death.    No follow-ups on file.        5/22/2024 rEon Hill

## 2024-05-22 ENCOUNTER — OFFICE VISIT (OUTPATIENT)
Dept: FAMILY MEDICINE | Facility: CLINIC | Age: 69
End: 2024-05-22
Payer: MEDICARE

## 2024-05-22 VITALS
HEART RATE: 84 BPM | WEIGHT: 210 LBS | HEIGHT: 66 IN | BODY MASS INDEX: 33.75 KG/M2 | DIASTOLIC BLOOD PRESSURE: 76 MMHG | SYSTOLIC BLOOD PRESSURE: 124 MMHG

## 2024-05-22 DIAGNOSIS — M54.42 CHRONIC BILATERAL LOW BACK PAIN WITH BILATERAL SCIATICA: ICD-10-CM

## 2024-05-22 DIAGNOSIS — M54.41 CHRONIC BILATERAL LOW BACK PAIN WITH BILATERAL SCIATICA: ICD-10-CM

## 2024-05-22 DIAGNOSIS — R30.0 DYSURIA: ICD-10-CM

## 2024-05-22 DIAGNOSIS — N39.492 POSTURAL URINARY INCONTINENCE: ICD-10-CM

## 2024-05-22 DIAGNOSIS — F51.04 CHRONIC INSOMNIA: ICD-10-CM

## 2024-05-22 DIAGNOSIS — Z76.0 MEDICATION REFILL: ICD-10-CM

## 2024-05-22 DIAGNOSIS — F33.9 CHRONIC RECURRENT MAJOR DEPRESSIVE DISORDER: ICD-10-CM

## 2024-05-22 DIAGNOSIS — G89.29 CHRONIC BILATERAL LOW BACK PAIN WITH BILATERAL SCIATICA: ICD-10-CM

## 2024-05-22 DIAGNOSIS — F41.0 PANIC DISORDER WITHOUT AGORAPHOBIA: Primary | ICD-10-CM

## 2024-05-22 PROCEDURE — 1159F MED LIST DOCD IN RCRD: CPT | Mod: CPTII,S$GLB,, | Performed by: FAMILY MEDICINE

## 2024-05-22 PROCEDURE — 3074F SYST BP LT 130 MM HG: CPT | Mod: CPTII,S$GLB,, | Performed by: FAMILY MEDICINE

## 2024-05-22 PROCEDURE — 3078F DIAST BP <80 MM HG: CPT | Mod: CPTII,S$GLB,, | Performed by: FAMILY MEDICINE

## 2024-05-22 PROCEDURE — 1160F RVW MEDS BY RX/DR IN RCRD: CPT | Mod: CPTII,S$GLB,, | Performed by: FAMILY MEDICINE

## 2024-05-22 PROCEDURE — 3288F FALL RISK ASSESSMENT DOCD: CPT | Mod: CPTII,S$GLB,, | Performed by: FAMILY MEDICINE

## 2024-05-22 PROCEDURE — 1101F PT FALLS ASSESS-DOCD LE1/YR: CPT | Mod: CPTII,S$GLB,, | Performed by: FAMILY MEDICINE

## 2024-05-22 PROCEDURE — 3008F BODY MASS INDEX DOCD: CPT | Mod: CPTII,S$GLB,, | Performed by: FAMILY MEDICINE

## 2024-05-22 PROCEDURE — 99214 OFFICE O/P EST MOD 30 MIN: CPT | Mod: S$GLB,,, | Performed by: FAMILY MEDICINE

## 2024-05-22 RX ORDER — FLUOXETINE HYDROCHLORIDE 40 MG/1
40 CAPSULE ORAL DAILY
Qty: 90 CAPSULE | Refills: 3 | Status: SHIPPED | OUTPATIENT
Start: 2024-05-22

## 2024-05-22 RX ORDER — VENLAFAXINE HYDROCHLORIDE 75 MG/1
75 CAPSULE, EXTENDED RELEASE ORAL DAILY
Qty: 90 CAPSULE | Refills: 3 | Status: SHIPPED | OUTPATIENT
Start: 2024-05-22

## 2024-05-22 RX ORDER — ZOLPIDEM TARTRATE 10 MG/1
10 TABLET ORAL NIGHTLY
Qty: 90 TABLET | Refills: 1 | Status: SHIPPED | OUTPATIENT
Start: 2024-05-22

## 2024-05-22 RX ORDER — BUSPIRONE HYDROCHLORIDE 15 MG/1
15 TABLET ORAL 2 TIMES DAILY
Qty: 180 TABLET | Refills: 1 | Status: SHIPPED | OUTPATIENT
Start: 2024-05-22

## 2024-05-22 RX ORDER — CYCLOBENZAPRINE HCL 10 MG
10 TABLET ORAL 3 TIMES DAILY PRN
Qty: 270 TABLET | Refills: 1 | Status: SHIPPED | OUTPATIENT
Start: 2024-05-22

## 2024-05-22 RX ORDER — ATORVASTATIN CALCIUM 20 MG/1
20 TABLET, FILM COATED ORAL DAILY
Qty: 90 TABLET | Refills: 3 | Status: SHIPPED | OUTPATIENT
Start: 2024-05-22

## 2024-05-22 RX ORDER — SOLIFENACIN SUCCINATE 10 MG/1
10 TABLET, FILM COATED ORAL DAILY
Qty: 90 TABLET | Refills: 1 | Status: SHIPPED | OUTPATIENT
Start: 2024-05-22

## 2024-05-28 DIAGNOSIS — Z00.00 ENCOUNTER FOR MEDICARE ANNUAL WELLNESS EXAM: ICD-10-CM

## 2024-07-10 ENCOUNTER — TELEPHONE (OUTPATIENT)
Dept: FAMILY MEDICINE | Facility: CLINIC | Age: 69
End: 2024-07-10
Payer: MEDICARE

## 2024-07-10 NOTE — TELEPHONE ENCOUNTER
----- Message from Liz Ramos sent at 7/10/2024 12:47 PM CDT -----    Refill Solfenacin [facility] on [address]. Pt's # [phone] GH

## 2024-07-11 DIAGNOSIS — N39.492 POSTURAL URINARY INCONTINENCE: ICD-10-CM

## 2024-07-11 RX ORDER — SOLIFENACIN SUCCINATE 10 MG/1
10 TABLET, FILM COATED ORAL DAILY
Qty: 90 TABLET | Refills: 1 | Status: SHIPPED | OUTPATIENT
Start: 2024-07-11

## 2024-07-11 NOTE — TELEPHONE ENCOUNTER
----- Message from Citlalli Waters sent at 7/11/2024  2:13 PM CDT -----  Vm- 1:57- [facility]   [phone]

## 2024-07-11 NOTE — TELEPHONE ENCOUNTER
The patient's prescription has been approved and sent   [id number] - [place], [place] - [address] AT [address]  [address] [address]  Phone: [phone] Fax: [phone]

## 2024-07-11 NOTE — TELEPHONE ENCOUNTER
----- Message from Liz Ramos sent at 7/11/2024  2:07 PM CDT -----  Refill Vesicae [facility] [address] The last prescription was sent to [facility]. They could not fill it. They told her she had to activate it. She has never had to do that before. She will go pick it up at [facility]  pt's # [phone]  GH

## 2024-07-25 DIAGNOSIS — R39.9 UTI SYMPTOMS: Primary | ICD-10-CM

## 2024-07-25 RX ORDER — SULFAMETHOXAZOLE AND TRIMETHOPRIM 800; 160 MG/1; MG/1
1 TABLET ORAL 2 TIMES DAILY
Qty: 6 TABLET | Refills: 0 | Status: SHIPPED | OUTPATIENT
Start: 2024-07-25 | End: 2024-07-28

## 2024-07-25 NOTE — TELEPHONE ENCOUNTER
Pt experiencing burning, frequency, and urgency. States she used to get UTI a lot as a kid and is now getting them more frequent. Pt denies having urologist. She was treated for UTI back in May. Results attached.   Pt asking for treatment sent. She needs today as the has cataract surgery tomorrow.     No allergies

## 2024-07-25 NOTE — TELEPHONE ENCOUNTER
----- Message from Nahomi Curiel sent at 7/25/2024  9:38 AM CDT -----  Pt has a UTI. Can you call something in for her. [facility] [address] Pt #[phone]

## 2024-07-25 NOTE — TELEPHONE ENCOUNTER
The patient's prescription has been approved and [facility] [id number] - [place], [place] - [address] AT [address]  [address] [address]  Phone: [phone] Fax: [phone]

## 2024-08-29 ENCOUNTER — OFFICE VISIT (OUTPATIENT)
Dept: FAMILY MEDICINE | Facility: CLINIC | Age: 69
End: 2024-08-29
Payer: MEDICARE

## 2024-08-29 VITALS
HEART RATE: 94 BPM | BODY MASS INDEX: 33.27 KG/M2 | SYSTOLIC BLOOD PRESSURE: 120 MMHG | HEIGHT: 67 IN | DIASTOLIC BLOOD PRESSURE: 78 MMHG | WEIGHT: 212 LBS

## 2024-08-29 DIAGNOSIS — F41.0 PANIC DISORDER WITHOUT AGORAPHOBIA: ICD-10-CM

## 2024-08-29 DIAGNOSIS — M54.42 CHRONIC BILATERAL LOW BACK PAIN WITH BILATERAL SCIATICA: ICD-10-CM

## 2024-08-29 DIAGNOSIS — Z76.0 MEDICATION REFILL: ICD-10-CM

## 2024-08-29 DIAGNOSIS — M54.41 CHRONIC BILATERAL LOW BACK PAIN WITH BILATERAL SCIATICA: ICD-10-CM

## 2024-08-29 DIAGNOSIS — F51.04 CHRONIC INSOMNIA: ICD-10-CM

## 2024-08-29 DIAGNOSIS — G89.29 CHRONIC BILATERAL LOW BACK PAIN WITH BILATERAL SCIATICA: ICD-10-CM

## 2024-08-29 DIAGNOSIS — Z01.818 PREOPERATIVE CLEARANCE: Primary | ICD-10-CM

## 2024-08-29 DIAGNOSIS — N39.492 POSTURAL URINARY INCONTINENCE: ICD-10-CM

## 2024-08-29 DIAGNOSIS — E66.9 CLASS 1 OBESITY WITH SERIOUS COMORBIDITY AND BODY MASS INDEX (BMI) OF 33.0 TO 33.9 IN ADULT, UNSPECIFIED OBESITY TYPE: ICD-10-CM

## 2024-08-29 RX ORDER — NYSTATIN 100000 U/G
CREAM TOPICAL 2 TIMES DAILY
Qty: 30 G | Refills: 1 | Status: SHIPPED | OUTPATIENT
Start: 2024-08-29

## 2024-08-29 RX ORDER — CYCLOBENZAPRINE HCL 10 MG
10 TABLET ORAL 3 TIMES DAILY PRN
Qty: 270 TABLET | Refills: 1 | Status: SHIPPED | OUTPATIENT
Start: 2024-08-29

## 2024-08-29 RX ORDER — SEMAGLUTIDE 0.25 MG/.5ML
2.5 INJECTION, SOLUTION SUBCUTANEOUS DAILY
Qty: 6 ML | Refills: 2 | Status: SHIPPED | OUTPATIENT
Start: 2024-08-29

## 2024-08-29 RX ORDER — TRIAMCINOLONE ACETONIDE 0.25 MG/G
CREAM TOPICAL 2 TIMES DAILY
Qty: 80 G | Refills: 1 | Status: SHIPPED | OUTPATIENT
Start: 2024-08-29

## 2024-08-29 RX ORDER — SOLIFENACIN SUCCINATE 10 MG/1
10 TABLET, FILM COATED ORAL DAILY
Qty: 90 TABLET | Refills: 1 | Status: SHIPPED | OUTPATIENT
Start: 2024-08-29

## 2024-08-29 RX ORDER — ZOLPIDEM TARTRATE 10 MG/1
10 TABLET ORAL NIGHTLY
Qty: 90 TABLET | Refills: 1 | Status: SHIPPED | OUTPATIENT
Start: 2024-08-29

## 2024-08-29 RX ORDER — BUSPIRONE HYDROCHLORIDE 15 MG/1
15 TABLET ORAL 2 TIMES DAILY
Qty: 180 TABLET | Refills: 1 | Status: SHIPPED | OUTPATIENT
Start: 2024-08-29

## 2024-08-29 NOTE — PROGRESS NOTES
SUBJECTIVE:    Patient ID: Stephanie Knox is a 69 y.o. female.    Chief Complaint: Follow-up (3 mo f/u//brought med bottles//needs surgical clearance for cataract extraction//declined mammogram//tc)      Pt here for cataract surgery clearance surgery in 2 weeks with Dr. Londono       Does take occasional hydrocodone before she is going to go out and do something.  Can't take it too late at night [facility] Has chronic pain from across the back. And has pain in the groin on the right and radiates to the foot as well. Sometimes her legs jump in the bed as well.     Anxiety has been bad. Doesn't want to go out. Doesn't want to leave the house except to go the doctor and eat out with her . Depression has been doing so-so. About a week ago she cried for 2 days after someone  in her 's family and she got sad wishing she had spent more time with her mom.  Has been taking buspar and prozac.  Has panic attacks and thinks they are getting worse if she is out and about. She doesn't like to be about in crowds.  (Has done biofeedback and psychology in the past, and that doesn't seem to help;failed trintellix, abilify.) Not claustrophobic.    Has not taken adderall in a long time. Though she thinks she probably could use it.     Still not sleeping well. Gets up and eats sometimes, that is without the ambien. Gets about 3-4 hours. (failed trazodone, nightmares)    Her incontinence has gotten a little better since taking vesicare. It reminds her that she needs to get up and go. Has a little more time to get to the bathroom.      Remains smoke free.      Pt has concerns for her weight. Has been doing stretching in chair since last visit and has been doing a bicycle inside as well.     Last labs done: fBS 102, TSH 2.16, LDL 79, GFR 51, Alk phos 234, AST 40  Has been treated for UTI since her labs were done. Still having symptoms, but always has pain down there.    ---------------------------------------------------------------------  Pt had cscope 9/2022, no polyp ([place]ashif) to repeat 3 years.   Mammogram 3/2021, due, already has order.  Will do when she doesn't think she will go and have a panic attack.  DEXA ordered.  [facility] exam [facility]TD [facility] [place] [facility]  Normal PVD screen.          No visits with results within 6 Month(s) from this visit.   Latest known visit with results is:   Office Visit on 01/11/2024   Component Date Value Ref Range Status    TSH w/reflex to FT4 05/06/2024 2.16  0.40 - 4.50 mIU/L Final    WBC 05/06/2024 10.1  3.8 - 10.8 Thousand/uL Final    RBC 05/06/2024 4.92  3.80 - 5.10 Million/uL Final    Hemoglobin 05/06/2024 12.7  11.7 - 15.5 g/dL Final    Hematocrit 05/06/2024 41.1  35.0 - 45.0 % Final    MCV 05/06/2024 83.5  80.0 - 100.0 fL Final    MCH 05/06/2024 25.8 (L)  27.0 - 33.0 pg Final    MCHC 05/06/2024 30.9 (L)  32.0 - 36.0 g/dL Final    RDW 05/06/2024 15.9 (H)  11.0 - 15.0 % Final    Platelets 05/06/2024 356  140 - 400 Thousand/uL Final    MPV 05/06/2024 9.1  7.5 - 12.5 fL Final    Neutrophils, Abs 05/06/2024 5,525  1,500 - 7,800 cells/uL Final    Lymph # 05/06/2024 3,171  850 - 3,900 cells/uL Final    Mono # 05/06/2024 879  200 - 950 cells/uL Final    Eos # 05/06/2024 374  15 - 500 cells/uL Final    Baso # 05/06/2024 152  0 - 200 cells/uL Final    Neutrophils Relative 05/06/2024 54.7  % Final    Lymph % 05/06/2024 31.4  % Final    Mono % 05/06/2024 8.7  % Final    Eosinophil % 05/06/2024 3.7  % Final    Basophil % 05/06/2024 1.5  % Final    Cholesterol 05/06/2024 159  <200 mg/dL Final    HDL 05/06/2024 52  > OR = 50 mg/dL Final    Triglycerides 05/06/2024 182 (H)  <150 mg/dL Final    LDL Cholesterol 05/06/2024 79  mg/dL (calc) Final    Comment: Reference range: <100     Desirable range <100 mg/dL for primary prevention;    <70 mg/dL for patients with CHD or diabetic patients   with > or = 2 CHD risk factors.     LDL-C is now calculated using the  Agus   calculation, which is a validated novel method providing   better accuracy than the Friedewald equation in the   estimation of LDL-C.   Naveen TERRY et al. ZAC. 2013;310(19): 6729-5694   (http://education.[employer]/faq/NUJ680)      HDL/Cholesterol Ratio 05/06/2024 3.1  <5.0 (calc) Final    Non HDL Chol. (LDL+VLDL) 05/06/2024 107  <130 mg/dL (calc) Final    Comment: For patients with diabetes plus 1 major ASCVD risk   factor, treating to a non-HDL-C goal of <100 mg/dL   (LDL-C of <70 mg/dL) is considered a therapeutic   option.      Glucose 05/06/2024 102 (H)  65 - 99 mg/dL Final    Comment:               Fasting reference interval     For someone without known diabetes, a glucose value  between 100 and 125 mg/dL is consistent with  prediabetes and should be confirmed with a  follow-up test.         BUN 05/06/2024 15  7 - 25 mg/dL Final    Creatinine 05/06/2024 1.18 (H)  0.50 - 1.05 mg/dL Final    eGFR 05/06/2024 51 (L)  > OR = 60 mL/min/1.73m2 Final    BUN/Creatinine Ratio 05/06/2024 13  6 - 22 (calc) Final    Sodium 05/06/2024 141  135 - 146 mmol/L Final    Potassium 05/06/2024 4.5  3.5 - 5.3 mmol/L Final    Chloride 05/06/2024 106  98 - 110 mmol/L Final    CO2 05/06/2024 21  20 - 32 mmol/L Final    Calcium 05/06/2024 9.6  8.6 - 10.4 mg/dL Final    Total Protein 05/06/2024 7.4  6.1 - 8.1 g/dL Final    Albumin 05/06/2024 4.1  3.6 - 5.1 g/dL Final    Globulin, Total 05/06/2024 3.3  1.9 - 3.7 g/dL (calc) Final    Albumin/Globulin Ratio 05/06/2024 1.2  1.0 - 2.5 (calc) Final    Total Bilirubin 05/06/2024 0.7  0.2 - 1.2 mg/dL Final    Alkaline Phosphatase 05/06/2024 234 (H)  37 - 153 U/L Final    AST 05/06/2024 40 (H)  10 - 35 U/L Final    ALT 05/06/2024 21  6 - 29 U/L Final    Color, UA 05/06/2024 DARK YELLOW  YELLOW Final    Appearance, UA 05/06/2024 CLEAR  CLEAR Final    Specific Gravity, UA 05/06/2024 1.027  1.001 - 1.035 Final    pH, UA 05/06/2024 5.5  5.0 - 8.0 Final    Glucose, UA  05/06/2024 NEGATIVE  NEGATIVE Final    Bilirubin, UA 05/06/2024 NEGATIVE  NEGATIVE Final    Ketones, UA 05/06/2024 TRACE (A)  NEGATIVE Final    Occult Blood UA 05/06/2024 NEGATIVE  NEGATIVE Final    Protein, UA 05/06/2024 TRACE (A)  NEGATIVE Final    Nitrite, UA 05/06/2024 POSITIVE (A)  NEGATIVE Final    Leukocytes, UA 05/06/2024 1+ (A)  NEGATIVE Final    WBC Casts, UA 05/06/2024 20-40 (A)  < OR = 5 /HPF Final    RBC Casts, UA 05/06/2024 0-2  < OR = 2 /HPF Final    Squam Epithel, UA 05/06/2024 6-10 (A)  < OR = 5 /HPF Final    Bacteria, UA 05/06/2024 NONE SEEN  NONE SEEN /HPF Final    Hyaline Casts, UA 05/06/2024 NONE SEEN  NONE SEEN /LPF Final    Service Cmt: 05/06/2024    Final    Comment: This urine was analyzed for the presence of WBC,   RBC, bacteria, casts, and other formed elements.   Only those elements seen were reported.            Reflexive Urine Culture 05/06/2024    Final    CULTURE INDICATED - RESULTS TO FOLLOW    Urine Culture, Routine 05/06/2024  (A)   Final    Comment:     CULTURE, URINE, ROUTINE         Micro Number:      [id number]    Test Status:       Final    Specimen Source:   Urine    Specimen Quality:  Adequate    Result:            Greater than 100,000 CFU/mL of Klebsiella pneumoniae      COMMENT:           Additional non-predominating organism(s) isolated.                       These organisms, commonly found on external and                       internal genitalia, are considered colonizers. No                       further testing performed.                              K.pneumoniae                            ----------------                            INT   LIZETTE     AMOX/CLAVULANATE       S     <=2     AMPICILLIN             R     16     AMP/SULBACTAM          S     <=2     CEFAZOLIN              NR    <=4 **2     CEFEPIME               S     <=1     CEFTAZIDIME            S     <=1     CEFTRIAXONE            S     <=1     CIPROFLOXACIN          S     <=0.25     GENTAMICIN             S      <=1     IMIPENEM               S     <=0.25     LE                           VOFLOXACIN           S     <=0.12     NITROFURANTOIN         S     <=16     PIP/TAZOBACTAM         S     <=4     TOBRAMYCIN             S     <=1     TRIMETHOPRIM/SULFA     S     <=20    S=Susceptible  I=Intermediate  R=Resistant  * = Not Tested  NR = Not Reported  **NN = See Therapy Comments      THERAPY COMMENTS        Note 1:      For infections other than uncomplicated UTI      caused by E. coli, K. pneumoniae or P. mirabilis:      Cefazolin is resistant if LIZETTE > or = 8 mcg/mL.      (Distinguishing susceptible versus intermediate      for isolates with LIZETTE < or = 4 mcg/mL requires      additional testing.)        Note 2:      For uncomplicated UTI caused by E. coli,      K. pneumoniae or P. mirabilis: Cefazolin is      susceptible if LIZETTE <32 mcg/mL and predicts      susceptible to the oral agents cefaclor, cefdinir,      cefpodoxime, cefprozil, cefuroxime, cephalexin      and loracarbef.               Past Medical History:   Diagnosis Date    ADHD (attention deficit hyperactivity disorder)     Anxiety     Arthritis     Colon polyps     Depression     Hyperlipidemia     Insomnia     Memory difficulties      Social History     Socioeconomic History    Marital status:    Tobacco Use    Smoking status: Former     Current packs/day: 0.25     Types: Cigarettes    Smokeless tobacco: Never   Substance and Sexual Activity    Alcohol use: Not Currently    Drug use: Never     Social Determinants of Health     Financial Resource Strain: Low Risk  (7/5/2023)    Overall Financial Resource Strain (CARDIA)     Difficulty of Paying Living Expenses: Not very hard   Food Insecurity: No Food Insecurity (7/5/2023)    Hunger Vital Sign     Worried About Running Out of Food in the Last Year: Never true     Ran Out of Food in the Last Year: Never true   Transportation Needs: No Transportation Needs (7/5/2023)    PRAPARE - Transportation     Lack of  Transportation (Medical): No     Lack of Transportation (Non-Medical): No   Physical Activity: Unknown (7/5/2023)    Exercise Vital Sign     Days of Exercise per Week: 4 days   Recent Concern: Physical Activity - Insufficiently Active (7/5/2023)    Exercise Vital Sign     Days of Exercise per Week: 4 days     Minutes of Exercise per Session: 30 min   Stress: Stress Concern Present (7/5/2023)    [place] [employer] - Occupational Stress Questionnaire     Feeling of Stress : Very much   Housing Stability: Low Risk  (7/5/2023)    Housing Stability Vital Sign     Unable to Pay for Housing in the Last Year: No     Number of Places Lived in the Last Year: 1     Unstable Housing in the Last Year: No     Past Surgical History:   Procedure Laterality Date    HIP REPLACEMENT ARTHROPLASTY Right 02/08/2023    TONSILLECTOMY       Family History   Problem Relation Name Age of Onset    Emphysema Mother      Diabetes Father      Breast cancer Maternal Aunt         Review of patient's allergies indicates:  No Known Allergies    Current Outpatient Medications:     atorvastatin (LIPITOR) 20 MG tablet, Take 1 tablet (20 mg total) by mouth once daily., Disp: 90 tablet, Rfl: 3    doxepin (SINEQUAN) 100 MG capsule, Take 1 capsule (100 mg total) by mouth every evening., Disp: 90 capsule, Rfl: 3    FLUoxetine 40 MG capsule, Take 1 capsule (40 mg total) by mouth once daily., Disp: 90 capsule, Rfl: 3    HYDROcodone-acetaminophen (NORCO)  mg per tablet, Take 1 tablet by mouth every 6 (six) hours as needed for Pain., Disp: 120 tablet, Rfl: 0    venlafaxine (EFFEXOR-XR) 75 MG 24 hr capsule, Take 1 capsule (75 mg total) by mouth once daily., Disp: 90 capsule, Rfl: 3    busPIRone (BUSPAR) 15 MG tablet, Take 1 tablet (15 mg total) by mouth 2 (two) times daily., Disp: 180 tablet, Rfl: 1    cyclobenzaprine (FLEXERIL) 10 MG tablet, Take 1 tablet (10 mg total) by mouth 3 (three) times daily as needed., Disp: 270 tablet, Rfl:  "1    nystatin (MYCOSTATIN) cream, Apply topically 2 (two) times daily., Disp: 30 g, Rfl: 1    semaglutide, weight loss, (WEGOVY) 0.25 mg/0.5 mL PnIj, Inject 2.5 mg into the skin Daily., Disp: 6 mL, Rfl: 2    solifenacin (VESICARE) 10 MG tablet, Take 1 tablet (10 mg total) by mouth once daily., Disp: 90 tablet, Rfl: 1    triamcinolone acetonide 0.025% (KENALOG) 0.025 % cream, Apply topically 2 (two) times daily., Disp: 80 g, Rfl: 1    zolpidem (AMBIEN) 10 mg Tab, Take 1 tablet (10 mg total) by mouth every evening., Disp: 90 tablet, Rfl: 1    Review of Systems   Constitutional:  Negative for appetite change and unexpected weight change.   Respiratory:  Negative for chest tightness, shortness of breath and wheezing.    Cardiovascular:  Negative for leg swelling.   Gastrointestinal:  Negative for constipation.        -heartburn   Genitourinary:  Negative for difficulty urinating, dysuria, frequency and urgency.   Musculoskeletal:  Positive for back pain.        +bilateral groin pain   Neurological:  Negative for dizziness.   Hematological:  Does not bruise/bleed easily.   Psychiatric/Behavioral:  Positive for dysphoric mood and sleep disturbance (due to pain). Negative for suicidal ideas. The patient is not nervous/anxious.    All other systems reviewed and are negative.         Objective:      Vitals:    08/29/24 0812   BP: 120/78   Pulse: 94   Weight: 96.2 kg (212 lb)   Height: 5' 7" (1.702 m)         Wt Readings from Last 6 Encounters:   08/29/24 96.2 kg (212 lb)   05/22/24 95.3 kg (210 lb)   01/11/24 92.1 kg (203 lb)   07/07/23 97.5 kg (215 lb)   02/06/23 94.3 kg (208 lb)   10/03/22 90.7 kg (200 lb)             Physical Exam  Vitals reviewed.   Constitutional:       General: She is not in acute distress.     Appearance: She is well-developed.   HENT:      Head: Normocephalic and atraumatic.   Neck:      Thyroid: No thyromegaly.   Cardiovascular:      Rate and Rhythm: Normal rate and regular rhythm.      Heart " sounds: Normal heart sounds. No murmur heard.     No friction rub.   Pulmonary:      Effort: Pulmonary effort is normal.      Breath sounds: Normal breath sounds. No wheezing or rales.   Abdominal:      General: Bowel sounds are normal. There is no distension.      Palpations: Abdomen is soft.      Tenderness: There is no abdominal tenderness.   Musculoskeletal:      Cervical back: Neck supple.   Lymphadenopathy:      Cervical: No cervical adenopathy.   Skin:     General: Skin is warm and dry.      Findings: No rash.   Neurological:      Mental Status: She is alert and oriented to person, place, and time.   Psychiatric:         Attention and Perception: She is attentive.         Speech: Speech normal.         Behavior: Behavior normal.         Thought Content: Thought content normal.         Judgment: Judgment normal.           Assessment:       1. Preoperative clearance    2. Panic disorder without agoraphobia    3. Postural urinary incontinence    4. Chronic bilateral low back pain with bilateral sciatica    5. Chronic insomnia    6. Class 1 obesity with serious comorbidity and body mass index (BMI) of 33.0 to 33.9 in adult, unspecified obesity type    7. Medication refill               Plan:       Preoperative clearance  Comments:  Pt is clear for cataract surgery.    Panic disorder without agoraphobia  Comments:  Controlled.  Will continue to monitor for symptoms  Orders:  -     busPIRone (BUSPAR) 15 MG tablet; Take 1 tablet (15 mg total) by mouth 2 (two) times daily.  Dispense: 180 tablet; Refill: 1    Postural urinary incontinence  Comments:  Improved. Will continue on Vesicare  Orders:  -     solifenacin (VESICARE) 10 MG tablet; Take 1 tablet (10 mg total) by mouth once daily.  Dispense: 90 tablet; Refill: 1    Chronic bilateral low back pain with bilateral sciatica  Comments:  Chronic. Will continue to monitor symptoms  Orders:  -     cyclobenzaprine (FLEXERIL) 10 MG tablet; Take 1 tablet (10 mg total) by  mouth 3 (three) times daily as needed.  Dispense: 270 tablet; Refill: 1    Chronic insomnia  Comments:  Symptomatic due to pain. To continue on ambien/doxepin.  Will continue on monitor symptoms.  Orders:  -     zolpidem (AMBIEN) 10 mg Tab; Take 1 tablet (10 mg total) by mouth every evening.  Dispense: 90 tablet; Refill: 1    Class 1 obesity with serious comorbidity and body mass index (BMI) of 33.0 to 33.9 in adult, unspecified obesity type  Comments:  Chronic. BMI 33.20. Discussed starting wegovy after f/u appt for cataract surgery. To stay hydrated. Discussed SE of nausea and constipation.  Orders:  -     semaglutide, weight loss, (WEGOVY) 0.25 mg/0.5 mL PnIj; Inject 2.5 mg into the skin Daily.  Dispense: 6 mL; Refill: 2    Medication refill  -     nystatin (MYCOSTATIN) cream; Apply topically 2 (two) times daily.  Dispense: 30 g; Refill: 1  -     triamcinolone acetonide 0.025% (KENALOG) 0.025 % cream; Apply topically 2 (two) times daily.  Dispense: 80 g; Refill: 1          Follow up in about 3 months (around 11/29/2024) for Anxiety, Weight.        8/29/2024 [place]

## 2024-09-06 DIAGNOSIS — Z76.0 MEDICATION REFILL: ICD-10-CM

## 2024-09-06 DIAGNOSIS — E66.9 CLASS 1 OBESITY WITH SERIOUS COMORBIDITY AND BODY MASS INDEX (BMI) OF 33.0 TO 33.9 IN ADULT, UNSPECIFIED OBESITY TYPE: ICD-10-CM

## 2024-09-06 DIAGNOSIS — G89.29 CHRONIC BILATERAL LOW BACK PAIN WITH BILATERAL SCIATICA: ICD-10-CM

## 2024-09-06 DIAGNOSIS — F41.0 PANIC DISORDER WITHOUT AGORAPHOBIA: ICD-10-CM

## 2024-09-06 DIAGNOSIS — M54.41 CHRONIC BILATERAL LOW BACK PAIN WITH BILATERAL SCIATICA: ICD-10-CM

## 2024-09-06 DIAGNOSIS — M54.42 CHRONIC BILATERAL LOW BACK PAIN WITH BILATERAL SCIATICA: ICD-10-CM

## 2024-09-06 RX ORDER — CYCLOBENZAPRINE HCL 10 MG
10 TABLET ORAL 3 TIMES DAILY PRN
Qty: 270 TABLET | Refills: 1 | Status: SHIPPED | OUTPATIENT
Start: 2024-09-06

## 2024-09-06 RX ORDER — TRIAMCINOLONE ACETONIDE 0.25 MG/G
CREAM TOPICAL 2 TIMES DAILY
Qty: 80 G | Refills: 1 | Status: SHIPPED | OUTPATIENT
Start: 2024-09-06

## 2024-09-06 RX ORDER — BUSPIRONE HYDROCHLORIDE 15 MG/1
15 TABLET ORAL 2 TIMES DAILY
Qty: 180 TABLET | Refills: 1 | Status: SHIPPED | OUTPATIENT
Start: 2024-09-06

## 2024-09-06 RX ORDER — NYSTATIN 100000 U/G
CREAM TOPICAL 2 TIMES DAILY
Qty: 30 G | Refills: 1 | Status: SHIPPED | OUTPATIENT
Start: 2024-09-06

## 2024-09-06 RX ORDER — SEMAGLUTIDE 0.25 MG/.5ML
0.5 INJECTION, SOLUTION SUBCUTANEOUS DAILY
Qty: 6 ML | Refills: 2 | Status: SHIPPED | OUTPATIENT
Start: 2024-09-06

## 2024-09-06 NOTE — TELEPHONE ENCOUNTER
----- Message from Chante Burnham MA sent at 9/6/2024  8:26 AM CDT -----  Pt is calling requesting refills says she only received zolpidem she is missing 5 other medications  [phone]  [facility]

## 2024-09-09 ENCOUNTER — TELEPHONE (OUTPATIENT)
Dept: FAMILY MEDICINE | Facility: CLINIC | Age: 69
End: 2024-09-09
Payer: MEDICARE

## 2024-09-09 NOTE — TELEPHONE ENCOUNTER
----- Message from Citlalli Waters sent at 9/9/2024  2:53 PM CDT -----  Pt needs a prior auth on wevy   [phone]

## 2024-09-16 ENCOUNTER — TELEPHONE (OUTPATIENT)
Dept: FAMILY MEDICINE | Facility: CLINIC | Age: 69
End: 2024-09-16
Payer: MEDICARE

## 2024-09-16 NOTE — TELEPHONE ENCOUNTER
----- Message from Nahomi Curiel sent at 9/16/2024 10:36 AM CDT -----  Pt states the wegovy needs a PA. Pt #[phone]

## 2024-10-11 ENCOUNTER — TELEPHONE (OUTPATIENT)
Dept: FAMILY MEDICINE | Facility: CLINIC | Age: 69
End: 2024-10-11
Payer: MEDICARE

## 2024-10-11 NOTE — TELEPHONE ENCOUNTER
----- Message from Tawny sent at 10/11/2024 10:40 AM CDT -----  Pt needs a surgical clearance. Getting surgery done next Friday   [phone]

## 2024-10-11 NOTE — TELEPHONE ENCOUNTER
Pt states she has cataract surgery  next Friday. Advised pt forms were faxed on 8/30/24. Pt voiced understanding.

## 2024-11-12 NOTE — TELEPHONE ENCOUNTER
EMERGENCY DEPARTMENT ENCOUNTER      Pt Name: Edda Tee  MRN: [id number]  Birthdate 1945  Date of evaluation: 11/12/2024  Provider: James Allen DO    CHIEF COMPLAINT       Chief Complaint   Patient presents with    Abnormal Labs     HISTORY OF PRESENT ILLNESS    Edda Tee is a 79 y.o. year old female who presents to the ER for high potassium. She is asymptomatic. No chest pain, dyspnea, palpitations, nausea/vomiting, abdominal pain, cramps, headaches, vision changes, changes to urine or stool.     PMH Afib on eliquis, PAD, lung cancer, COPD not on oxygen  PSH AICD, possible RLE stent,   + tobacco use, + alcohol,   PAST MEDICAL HISTORY     Past Medical History:   Diagnosis Date    Cellulitis of right lower limb 06/29/2020    Cellulitis of both feet    ERRONEOUS ENCOUNTER--DISREGARD     Low back pain, unspecified 08/18/2022    Acute bilateral low back pain without sciatica    Nicotine dependence, cigarettes, uncomplicated 07/26/2022    Tobacco dependence due to cigarettes    Other vascular myelopathies 12/08/2021    Neurogenic claudication    Pain in unspecified ankle and joints of unspecified foot 08/29/2020    Ankle pain    Personal history of other drug therapy     History of anticoagulant therapy    Personal history of other endocrine, nutritional and metabolic disease 08/18/2022    History of hyperkalemia    Personal history of other infectious and parasitic diseases 01/29/2018    History of herpes labialis    Personal history of other specified conditions 09/04/2020    History of palpitations    Radiculopathy, lumbar region 12/08/2021    Lumbar radiculopathy    Radiculopathy, lumbar region 07/02/2021    Left lumbar radiculopathy    Tobacco abuse counseling 05/06/2022    Tobacco abuse counseling    Transient cerebral ischemic attack, unspecified     TIA on medication    Unspecified mononeuropathy of unspecified lower limb 10/01/2021    Lower extremity neuropathy    Urinary tract infection,  Spoke to patient and scheduled appt   site not specified 08/31/2020    Acute UTI     CURRENT MEDICATIONS       Discharge Medication List as of 11/12/2024 11:24 AM        CONTINUE these medications which have NOT CHANGED    Details   alendronate (Fosamax) 70 mg tablet Take 1 tablet (70 mg) by mouth every 7 days. Take in the morning with a full glass of water, on an empty stomach, and do not take anything else by mouth or lie down for the next 30 min., Starting Tue 5/14/2024, Until Wed 5/14/2025, Print      amiodarone (Pacerone) 200 mg tablet TAKE ONE TABLET BY MOUTH EVERY DAY, Normal      apixaban (Eliquis) 2.5 mg tablet Take 1 tablet (2.5 mg) by mouth 2 times a day., Starting Thu 11/7/2024, Until Fri 11/7/2025, Normal      calcitonin, salmon, (Miacalcin) 200 unit/actuation nasal spray Administer 1 spray into one nostril once daily., Starting Tue 5/14/2024, Until Wed 5/14/2025, Normal      carvedilol (Coreg) 6.25 mg tablet Take 0.5 tablets (3.125 mg) by mouth 2 times a day with meals., Starting Mon 1/22/2024, Until Tue 1/21/2025, Normal      ergocalciferol (Vitamin D-2) 1.25 MG (22302 UT) capsule Take 1 capsule (50,000 Units) by mouth 1 (one) time per week., Starting Sun 4/21/2024, Until Sun 3/23/2025, Normal      hydrOXYzine HCL (Atarax) 25 mg tablet TAKE ONE TABLET BY MOUTH TWO TIMES A DAY, Starting Wed 7/10/2024, Normal      lidocaine (Lidoderm) 5 % patch Place 1 patch over 12 hours on the skin once daily. Apply to painful area 12 hours per day, remove for 12 hours., Starting Tue 3/26/2024, Until Wed 3/26/2025, Normal      MAGNESIUM GLUCONATE ORAL Take 1 tablet by mouth once daily. 500 MG Oral Tablet, Historical Med      spironolactone (Aldactone) 25 mg tablet TAKE ONE-HALF TABLET BY MOUTH DAILY, Starting Fri 5/10/2024, Normal      vit C/E/Zn/coppr/lutein/zeaxan (PRESERVISION AREDS-2 ORAL) Take 1 capsule by mouth once daily., Historical Med      vitamin E 180 mg (400 unit) capsule Take 1 capsule (400 Units) by mouth once daily., Historical Med            SURGICAL HISTORY       Past Surgical History:   Procedure Laterality Date    CT ANGIO AORTA AND BILATERAL ILIOFEMORAL RUN OFF INCLUDING WITHOUT CONTRAST IF PERFORMED  3/8/2023    CT AORTA AND BILATERAL ILIOFEMORAL RUNOFF ANGIOGRAM W AND/OR WO IV CONTRAST STJ CT    OTHER SURGICAL HISTORY  2022    Loop recorder insertion     ALLERGIES     Gabapentin, Sulfa (sulfonamide antibiotics), Sulfanilamide (bulk), and Sulfasalazine  FAMILY HISTORY       Family History   Problem Relation Name Age of Onset    Hypertension Mother      Diabetes Mother      Stroke Father       SOCIAL HISTORY       Social History     Tobacco Use    Smoking status: Some Days     Current packs/day: 0.00     Types: Cigarettes     Last attempt to quit:      Years since quittin.8     Passive exposure: Past    Smokeless tobacco: Never   Vaping Use    Vaping status: Never Used   Substance Use Topics    Alcohol use: Never    Drug use: Never     PHYSICAL EXAM  (up to 7 for level 4, 8 or more for level 5)     ED Triage Vitals [24 1012]   Temperature Heart Rate Respirations BP   36.8 °C (98.2 °F) 69 16 119/68      Pulse Ox Temp Source Heart Rate Source Patient Position   94 % Temporal Monitor Sitting      BP Location FiO2 (%)     Right arm --       Physical Exam  Vitals and nursing note reviewed.   Constitutional:       General: She is not in acute distress.     Appearance: Normal appearance. She is not ill-appearing.   HENT:      Head: Normocephalic and atraumatic. No raccoon eyes, Santana's sign, contusion or laceration.      Jaw: No trismus or malocclusion.      Right Ear: External ear normal.      Left Ear: External ear normal.      Mouth/Throat:      Mouth: Mucous membranes are moist.      Pharynx: Oropharynx is clear.   Eyes:      Extraocular Movements: Extraocular movements intact.      Pupils: Pupils are equal, round, and reactive to light.   Neck:      Trachea: No tracheal deviation.   Cardiovascular:      Rate and Rhythm: Normal  rate and regular rhythm.      Pulses: Normal pulses.   Pulmonary:      Effort: No respiratory distress.      Breath sounds: Rales (left lower) present. No wheezing or rhonchi.   Chest:      Chest wall: No tenderness.   Abdominal:      General: Abdomen is flat.      Palpations: Abdomen is soft. There is no mass.      Tenderness: There is no abdominal tenderness.   Musculoskeletal:         General: No tenderness or signs of injury.      Right lower leg: No edema.      Left lower leg: No edema.   Skin:     Coloration: Skin is not jaundiced or pale.      Findings: No petechiae, rash or wound.   Neurological:      Mental Status: She is alert.   Psychiatric:         Speech: Speech normal.         Thought Content: Thought content does not include homicidal or suicidal ideation.        DIAGNOSTIC RESULTS   LABS:  Labs Reviewed   RENAL FUNCTION PANEL - Abnormal       Result Value    Glucose 111 (*)     Sodium 138      Potassium 4.6      Chloride 103      Bicarbonate 28      Anion Gap 12      Urea Nitrogen 39 (*)     Creatinine 2.03 (*)     eGFR 25 (*)     Calcium 9.2      Phosphorus 3.9      Albumin 3.8     MAGNESIUM - Normal    Magnesium 2.18       All other labs were within normal range or not returned as of this dictation.  Imaging  No orders to display      Procedure  Procedures  EMERGENCY DEPARTMENT COURSE/MDM:   Medical Decision Making    Vitals:    Vitals:    11/12/24 1100 11/12/24 1110 11/12/24 1115 11/12/24 1130   BP:  109/62     BP Location:       Patient Position:       Pulse: 68 68 68 68   Resp: (!) 27 19 20 (!) 25   Temp:       TempSrc:       SpO2: 96% 97% 95% (!) 93%   Weight:       Height:         Edda Tee is a female 79 y.o. who presents to the ER for elevated potassium on outside lab. On arrival the patients vital signs were: Afebrile, Reguar HR, Normotensive, Regular RR, and Normoxic on room air. History obtained from: patient. Given she is asymptomatic, plan for RFP, Mg, EKG to assess for  electrolyte derangements and their potential associated EKG changes.     EKG 1054 Rhythm:  Paced atrially  Ventricular rate: 70 Intervals (-200 /QRS  /QT >450M or >470F):  QRS 90 Qtc 483 Blocks: None Potential signs of ischemia: No pathological Q waves, contiguous ST elevations or depressions, biphasic T waves, or  T wave inversions     ED Course as of 11/15/24 2258   Tue Nov 12, 2024   1116 Renal function panel(!)  Normoglycemic, no acute electrolyte or renal abnormality [CB]      ED Course User Index  [CB] Jovanni Canas DO         Diagnoses as of 11/15/24 2258   Abnormal laboratory test result     External Records Reviewed: I reviewed recent and relevant outside records including inpatient notes, outpatient records.  She has been told to hold her spironolactone.    Shared decision making for disposition  Patient and/or patient´s representative was counseled regarding labs, imaging, likely diagnosis. All questions were answered. Recommendation was made   for discharge home. The patient agreed and was discharged home in stable condition with appropriate relevant educational materials. Return precautions were provided.  ED Medications administered this visit:  Medications - No data to display    New Prescriptions from this visit:    Discharge Medication List as of 11/12/2024 11:24 AM          Follow-up:  Sunshine Warren MD  [address]  [facility], [address]  [place] [id number] [phone]    In 3 days          Final Impression:   1. Abnormal laboratory test result          Please excuse any misspellings or unintended errors related to the Dragon speech recognition software used to dictate this note.    I reviewed the case with the attending ED physician. The attending ED physician agrees with the plan.      Jovanni Canas DO  Resident  11/12/24 1257        The patient was seen by the resident/fellow.  I have personally performed a substantive portion of the encounter.  I  have seen and examined the patient; agree with the workup, evaluation, MDM, management and diagnosis.  The care plan has been discussed with the resident; I have reviewed the resident’s note and agree with the documented findings.                                                    James Allen, DO  11/15/24 7539

## 2024-11-21 ENCOUNTER — TELEPHONE (OUTPATIENT)
Dept: FAMILY MEDICINE | Facility: CLINIC | Age: 69
End: 2024-11-21

## 2024-11-21 NOTE — TELEPHONE ENCOUNTER
----- Message from Nahomi sent at 11/21/2024 11:14 AM CST -----  Pt needs to reschedule her no show appt. Pt #[phone]

## 2024-12-10 DIAGNOSIS — N39.492 POSTURAL URINARY INCONTINENCE: ICD-10-CM

## 2024-12-10 RX ORDER — SOLIFENACIN SUCCINATE 10 MG/1
10 TABLET, FILM COATED ORAL DAILY
Qty: 90 TABLET | Refills: 1 | Status: SHIPPED | OUTPATIENT
Start: 2024-12-10

## 2024-12-10 NOTE — TELEPHONE ENCOUNTER
The patient's prescription has been approved and sent to   [facility] - [place], [place] - [address]  [address]  [place] [address]  Phone: [phone] Fax: [phone]

## 2024-12-10 NOTE — TELEPHONE ENCOUNTER
----- Message from Med Assistant Alexander sent at 12/10/2024  9:44 AM CST -----  Patient is calling requesting refills on Solifienacin.  Patient would also like to refill adderall as well.      [facility]  Pt: [phone]

## 2024-12-12 DIAGNOSIS — F51.04 CHRONIC INSOMNIA: ICD-10-CM

## 2024-12-12 RX ORDER — ZOLPIDEM TARTRATE 10 MG/1
10 TABLET ORAL NIGHTLY
Qty: 90 TABLET | Refills: 1 | Status: SHIPPED | OUTPATIENT
Start: 2024-12-12

## 2024-12-12 NOTE — TELEPHONE ENCOUNTER
The patient's prescription has been approved and sent to   [facility] - [place] - [address]  [address]  [place] [address]  Phone: [phone] Fax: [phone]

## 2024-12-12 NOTE — TELEPHONE ENCOUNTER
----- Message from Tawny sent at 12/12/2024 11:13 AM CST -----  Getting ready to go out of town and needing a prescription of 10 Ambien   [address]   [phone]

## 2024-12-16 DIAGNOSIS — N39.492 POSTURAL URINARY INCONTINENCE: ICD-10-CM

## 2024-12-16 DIAGNOSIS — F51.04 CHRONIC INSOMNIA: ICD-10-CM

## 2024-12-16 DIAGNOSIS — F41.0 PANIC DISORDER WITHOUT AGORAPHOBIA: ICD-10-CM

## 2024-12-16 RX ORDER — ZOLPIDEM TARTRATE 10 MG/1
10 TABLET ORAL NIGHTLY
Qty: 90 TABLET | Refills: 1 | Status: CANCELLED | OUTPATIENT
Start: 2024-12-16

## 2024-12-16 RX ORDER — ZOLPIDEM TARTRATE 10 MG/1
10 TABLET ORAL NIGHTLY
Qty: 90 TABLET | Refills: 1 | Status: SHIPPED | OUTPATIENT
Start: 2024-12-16

## 2024-12-16 RX ORDER — SOLIFENACIN SUCCINATE 10 MG/1
10 TABLET, FILM COATED ORAL DAILY
Qty: 90 TABLET | Refills: 1 | Status: SHIPPED | OUTPATIENT
Start: 2024-12-16

## 2024-12-16 RX ORDER — SOLIFENACIN SUCCINATE 10 MG/1
10 TABLET, FILM COATED ORAL DAILY
Qty: 90 TABLET | Refills: 1 | Status: CANCELLED | OUTPATIENT
Start: 2024-12-16

## 2024-12-16 NOTE — TELEPHONE ENCOUNTER
The patient's prescription has been approved and sent to [facility] - [place], [place] - [address]  [address]  [place] [address]  Phone: [phone] Fax: [phone]

## 2024-12-16 NOTE — TELEPHONE ENCOUNTER
----- Message from Tawny sent at 12/16/2024 11:12 AM CST -----  Contact: Sylvia   sylvia calling in regarding to the pharmacy not having enough Ambien. Another order for Ambien needs to be resubmit. Message needs to be sent to [facility] in order for patient to have this medication. Asking if there is a way that Dr. Hill could call in for patient Ambien due to patient going out of town.  asking if Zolpidem was also sent to /  [facility]- pharmacy   [phone]

## 2024-12-16 NOTE — TELEPHONE ENCOUNTER
----- Message from Tawny sent at 12/16/2024 12:01 PM CST -----  Contact: Corey ward calling Please send both scripts over to [facility] - unsure which [facility] is clos[facility] to him.   [phone]

## 2024-12-16 NOTE — TELEPHONE ENCOUNTER
Spoke with pt . States he needs to  a printed rx of vesicare and ambien. States [facility] does not have full supplies of these medications and they are asking fro more info to get these filled. Corey states they are trying to go out of town today and can not cont to wait on [facility]. States he will just get printed rx and find a cheap pharmacy.   Gave pt good rx prices of these medications. Looks like [facility] would be the best option for both medications. Pt would like these medications sent to [facility] [address]. He will just pay cash price.   Rx pended. He requested 90 day supply.

## 2024-12-16 NOTE — TELEPHONE ENCOUNTER
Spoke with pt confirmed pharmacy.   Pt asking how good rx works advised that I can type a med in and it will show me the cheapest cash price meds. Pt voiced understanding. Would like rx to go back to [facility] [address].

## 2025-01-04 NOTE — PROGRESS NOTES
SUBJECTIVE:    Patient ID: Stephanie Knox is a 69 y.o. female.    Chief Complaint: Follow-up (6 mo f/u//brought med bottles//declined mammogram//fluad ordered//tc)      Pt here for cataract surgery clearance surgery in 2 weeks with Dr. Londono       Does take occasional hydrocodone before she is going to go out and do something.  The flexeril which used to work for her is not really working for her anymore.  Her left left is bothering her a lot now and has pain from her left back to her left foot, mainly when she gets into bed or when she sits for too long. At night her   Has chronic pain from across the back.  Used to get shots in her back weekly from Dr. Madrigal that used to help for a while.     Anxiety has been bad. The buspar is not working at all. Still doesn't wan to go out or leave the house except to go the doctor, see her kids.   She has only 6 times since about August. Hasn't been going out to eat with her . Depression has been has been bad. Thinks it has to do with the holidays. Thinks it has to do with her mom. Still having crying spurts.   Has been having  panic attacks.  She doesn't like to be about in crowds.  (Has done biofeedback and psychology in the past, and that doesn't seem to help;failed trintellix, abilify.) Not claustrophobic.    Has not taken adderall in a long time. Though she thinks she probably could use it.     Still not sleeping well. Gets up and eats sometimes, that is without the ambien. Gets about 3-4 hours. (failed trazodone, nightmares)    The vesicare is working for her incontinence. Giving her enough to get to the bathroom.       Remains smoke free.          No recent labs.   ---------------------------------------------------------------------  Pt had cscope 9/2022, no polyp ([place]) to repeat 3 years.   Mammogram 3/2021, due, already has order.  Will do when she doesn't think she will go and have a panic attack.  DEXA ordered.  Eye exam UTD at [facility]  [facility]  Normal PVD screen.          No visits with results within 6 Month(s) from this visit.   Latest known visit with results is:   Office Visit on 01/11/2024   Component Date Value Ref Range Status    TSH w/reflex to FT4 05/06/2024 2.16  0.40 - 4.50 mIU/L Final    WBC 05/06/2024 10.1  3.8 - 10.8 Thousand/uL Final    RBC 05/06/2024 4.92  3.80 - 5.10 Million/uL Final    Hemoglobin 05/06/2024 12.7  11.7 - 15.5 g/dL Final    Hematocrit 05/06/2024 41.1  35.0 - 45.0 % Final    MCV 05/06/2024 83.5  80.0 - 100.0 fL Final    MCH 05/06/2024 25.8 (L)  27.0 - 33.0 pg Final    MCHC 05/06/2024 30.9 (L)  32.0 - 36.0 g/dL Final    RDW 05/06/2024 15.9 (H)  11.0 - 15.0 % Final    Platelets 05/06/2024 356  140 - 400 Thousand/uL Final    MPV 05/06/2024 9.1  7.5 - 12.5 fL Final    Neutrophils, Abs 05/06/2024 5,525  1,500 - 7,800 cells/uL Final    Lymph # 05/06/2024 3,171  850 - 3,900 cells/uL Final    Mono # 05/06/2024 879  200 - 950 cells/uL Final    Eos # 05/06/2024 374  15 - 500 cells/uL Final    Baso # 05/06/2024 152  0 - 200 cells/uL Final    Neutrophils Relative 05/06/2024 54.7  % Final    Lymph % 05/06/2024 31.4  % Final    Mono % 05/06/2024 8.7  % Final    Eosinophil % 05/06/2024 3.7  % Final    Basophil % 05/06/2024 1.5  % Final    Cholesterol 05/06/2024 159  <200 mg/dL Final    HDL 05/06/2024 52  > OR = 50 mg/dL Final    Triglycerides 05/06/2024 182 (H)  <150 mg/dL Final    LDL Cholesterol 05/06/2024 79  mg/dL (calc) Final    Comment: Reference range: <100     Desirable range <100 mg/dL for primary prevention;    <70 mg/dL for patients with CHD or diabetic patients   with > or = 2 CHD risk factors.     LDL-C is now calculated using the Agus   calculation, which is a validated novel method providing   better accuracy than the Friedewald equation in the   estimation of LDL-C.   Naveen TERRY et al. ZAC. 2013;310(19): 5564-6042   (http://education.[employer].com/faq/SZU351)      HDL/Cholesterol Ratio 05/06/2024 3.1   <5.0 (calc) Final    Non HDL Chol. (LDL+VLDL) 05/06/2024 107  <130 mg/dL (calc) Final    Comment: For patients with diabetes plus 1 major ASCVD risk   factor, treating to a non-HDL-C goal of <100 mg/dL   (LDL-C of <70 mg/dL) is considered a therapeutic   option.      Glucose 05/06/2024 102 (H)  65 - 99 mg/dL Final    Comment:               Fasting reference interval     For someone without known diabetes, a glucose value  between 100 and 125 mg/dL is consistent with  prediabetes and should be confirmed with a  follow-up test.         BUN 05/06/2024 15  7 - 25 mg/dL Final    Creatinine 05/06/2024 1.18 (H)  0.50 - 1.05 mg/dL Final    eGFR 05/06/2024 51 (L)  > OR = 60 mL/min/1.73m2 Final    BUN/Creatinine Ratio 05/06/2024 13  6 - 22 (calc) Final    Sodium 05/06/2024 141  135 - 146 mmol/L Final    Potassium 05/06/2024 4.5  3.5 - 5.3 mmol/L Final    Chloride 05/06/2024 106  98 - 110 mmol/L Final    CO2 05/06/2024 21  20 - 32 mmol/L Final    Calcium 05/06/2024 9.6  8.6 - 10.4 mg/dL Final    Total Protein 05/06/2024 7.4  6.1 - 8.1 g/dL Final    Albumin 05/06/2024 4.1  3.6 - 5.1 g/dL Final    Globulin, Total 05/06/2024 3.3  1.9 - 3.7 g/dL (calc) Final    Albumin/Globulin Ratio 05/06/2024 1.2  1.0 - 2.5 (calc) Final    Total Bilirubin 05/06/2024 0.7  0.2 - 1.2 mg/dL Final    Alkaline Phosphatase 05/06/2024 234 (H)  37 - 153 U/L Final    AST 05/06/2024 40 (H)  10 - 35 U/L Final    ALT 05/06/2024 21  6 - 29 U/L Final    Color, UA 05/06/2024 DARK YELLOW  YELLOW Final    Appearance, UA 05/06/2024 CLEAR  CLEAR Final    Specific Gravity, UA 05/06/2024 1.027  1.001 - 1.035 Final    pH, UA 05/06/2024 5.5  5.0 - 8.0 Final    Glucose, UA 05/06/2024 NEGATIVE  NEGATIVE Final    Bilirubin, UA 05/06/2024 NEGATIVE  NEGATIVE Final    Ketones, UA 05/06/2024 TRACE (A)  NEGATIVE Final    Occult Blood UA 05/06/2024 NEGATIVE  NEGATIVE Final    Protein, UA 05/06/2024 TRACE (A)  NEGATIVE Final    Nitrite, UA 05/06/2024 POSITIVE (A)  NEGATIVE Final     Leukocytes, UA 05/06/2024 1+ (A)  NEGATIVE Final    WBC Casts, UA 05/06/2024 20-40 (A)  < OR = 5 /HPF Final    RBC Casts, UA 05/06/2024 0-2  < OR = 2 /HPF Final    Squam Epithel, UA 05/06/2024 6-10 (A)  < OR = 5 /HPF Final    Bacteria, UA 05/06/2024 NONE SEEN  NONE SEEN /HPF Final    Hyaline Casts, UA 05/06/2024 NONE SEEN  NONE SEEN /LPF Final    Service Cmt: 05/06/2024    Final    Comment: This urine was analyzed for the presence of WBC,   RBC, bacteria, casts, and other formed elements.   Only those elements seen were reported.            Reflexive Urine Culture 05/06/2024    Final    CULTURE INDICATED - RESULTS TO FOLLOW    Urine Culture, Routine 05/06/2024  (A)   Final    Comment:     CULTURE, URINE, ROUTINE         Micro Number:      [id number]    Test Status:       Final    Specimen Source:   Urine    Specimen Quality:  Adequate    Result:            Greater than 100,000 CFU/mL of Klebsiella pneumoniae      COMMENT:           Additional non-predominating organism(s) isolated.                       These organisms, commonly found on external and                       internal genitalia, are considered colonizers. No                       further testing performed.                              K.pneumoniae                            ----------------                            INT   LIZETTE     AMOX/CLAVULANATE       S     <=2     AMPICILLIN             R     16     AMP/SULBACTAM          S     <=2     CEFAZOLIN              NR    <=4 **2     CEFEPIME               S     <=1     CEFTAZIDIME            S     <=1     CEFTRIAXONE            S     <=1     CIPROFLOXACIN          S     <=0.25     GENTAMICIN             S     <=1     IMIPENEM               S     <=0.25     LE                           VOFLOXACIN           S     <=0.12     NITROFURANTOIN         S     <=16     PIP/TAZOBACTAM         S     <=4     TOBRAMYCIN             S     <=1     TRIMETHOPRIM/SULFA     S     <=20    S=Susceptible  I=Intermediate   R=Resistant  * = Not Tested  NR = Not Reported  **NN = See Therapy Comments      THERAPY COMMENTS        Note 1:      For infections other than uncomplicated UTI      caused by E. coli, K. pneumoniae or P. mirabilis:      Cefazolin is resistant if LIZETTE > or = 8 mcg/mL.      (Distinguishing susceptible versus intermediate      for isolates with LIZETTE < or = 4 mcg/mL requires      additional testing.)        Note 2:      For uncomplicated UTI caused by E. coli,      K. pneumoniae or P. mirabilis: Cefazolin is      susceptible if LIZETTE <32 mcg/mL and predicts      susceptible to the oral agents cefaclor, cefdinir,      cefpodoxime, cefprozil, cefuroxime, cephalexin      and loracarbef.               Past Medical History:   Diagnosis Date    ADHD (attention deficit hyperactivity disorder)     Anxiety     Arthritis     Colon polyps     Depression     Hyperlipidemia     Insomnia     Memory difficulties      Social History     Socioeconomic History    Marital status:    Tobacco Use    Smoking status: Former     Current packs/day: 0.25     Types: Cigarettes    Smokeless tobacco: Never   Substance and Sexual Activity    Alcohol use: Not Currently    Drug use: Never     Social Drivers of Health     Financial Resource Strain: Low Risk  (7/5/2023)    Overall Financial Resource Strain (CARDIA)     Difficulty of Paying Living Expenses: Not very hard   Food Insecurity: No Food Insecurity (7/5/2023)    Hunger Vital Sign     Worried About Running Out of Food in the Last Year: Never true     Ran Out of Food in the Last Year: Never true   Transportation Needs: No Transportation Needs (7/5/2023)    PRAPARE - Transportation     Lack of Transportation (Medical): No     Lack of Transportation (Non-Medical): No   Physical Activity: Unknown (7/5/2023)    Exercise Vital Sign     Days of Exercise per Week: 4 days   Recent Concern: Physical Activity - Insufficiently Active (7/5/2023)    Exercise Vital Sign     Days of Exercise per Week: 4  days     Minutes of Exercise per Session: 30 min   Stress: Stress Concern Present (7/5/2023)    [employer] - Occupational Stress Questionnaire     Feeling of Stress : Very much   Housing Stability: Low Risk  (7/5/2023)    Housing Stability Vital Sign     Unable to Pay for Housing in the Last Year: No     Number of Places Lived in the Last Year: 1     Unstable Housing in the Last Year: No     Past Surgical History:   Procedure Laterality Date    HIP REPLACEMENT ARTHROPLASTY Right 02/08/2023    TONSILLECTOMY       Family History   Problem Relation Name Age of Onset    Emphysema Mother      Diabetes Father      Breast cancer Maternal Aunt         Review of patient's allergies indicates:  No Known Allergies    Current Outpatient Medications:     atorvastatin (LIPITOR) 20 MG tablet, Take 1 tablet (20 mg total) by mouth once daily., Disp: 90 tablet, Rfl: 3    busPIRone (BUSPAR) 15 MG tablet, Take 1 tablet (15 mg total) by mouth 2 (two) times daily., Disp: 180 tablet, Rfl: 1    cyclobenzaprine (FLEXERIL) 10 MG tablet, Take 1 tablet (10 mg total) by mouth 3 (three) times daily as needed., Disp: 270 tablet, Rfl: 1    FLUoxetine 40 MG capsule, Take 1 capsule (40 mg total) by mouth once daily., Disp: 90 capsule, Rfl: 3    HYDROcodone-acetaminophen (NORCO)  mg per tablet, Take 1 tablet by mouth every 6 (six) hours as needed for Pain., Disp: 120 tablet, Rfl: 0    nystatin (MYCOSTATIN) cream, Apply topically 2 (two) times daily., Disp: 30 g, Rfl: 1    solifenacin (VESICARE) 10 MG tablet, Take 1 tablet (10 mg total) by mouth once daily., Disp: 90 tablet, Rfl: 1    triamcinolone acetonide 0.025% (KENALOG) 0.025 % cream, Apply topically 2 (two) times daily., Disp: 80 g, Rfl: 1    venlafaxine (EFFEXOR-XR) 75 MG 24 hr capsule, Take 1 capsule (75 mg total) by mouth once daily., Disp: 90 capsule, Rfl: 3    zolpidem (AMBIEN) 10 mg Tab, Take 1 tablet (10 mg total) by mouth every evening., Disp: 90  "tablet, Rfl: 1    pregabalin (LYRICA) 75 MG capsule, Take 1 capsule (75 mg total) by mouth 2 (two) times daily as needed (back pain)., Disp: 90 capsule, Rfl: 0  No current facility-administered medications for this visit.    Review of Systems   Constitutional:  Negative for appetite change and unexpected weight change.   Respiratory:  Negative for chest tightness, shortness of breath and wheezing.    Cardiovascular:  Negative for leg swelling.   Gastrointestinal:  Negative for constipation.        -heartburn   Genitourinary:  Negative for difficulty urinating, dysuria, frequency and urgency.   Musculoskeletal:  Positive for back pain.        +bilateral groin pain   Neurological:  Negative for dizziness.   Hematological:  Does not bruise/bleed easily.   Psychiatric/Behavioral:  Positive for dysphoric mood and sleep disturbance (due to pain). Negative for suicidal ideas. The patient is not nervous/anxious.    All other systems reviewed and are negative.         Objective:      Vitals:    01/09/25 1231   BP: 98/62   Pulse: 77   SpO2: 96%   Weight: 93.4 kg (206 lb)   Height: 5' 7" (1.702 m)           Wt Readings from Last 6 Encounters:   01/09/25 93.4 kg (206 lb)   08/29/24 96.2 kg (212 lb)   05/22/24 95.3 kg (210 lb)   01/11/24 92.1 kg (203 lb)   07/07/23 97.5 kg (215 lb)   02/06/23 94.3 kg (208 lb)             Physical Exam  Vitals reviewed.   Constitutional:       General: She is not in acute distress.     Appearance: She is well-developed.   HENT:      Head: Normocephalic and atraumatic.   Neck:      Thyroid: No thyromegaly.   Cardiovascular:      Rate and Rhythm: Normal rate and regular rhythm.      Heart sounds: Normal heart sounds. No murmur heard.     No friction rub.   Pulmonary:      Effort: Pulmonary effort is normal.      Breath sounds: Normal breath sounds. No wheezing or rales.   Abdominal:      General: Bowel sounds are normal. There is no distension.      Palpations: Abdomen is soft.      Tenderness: " There is no abdominal tenderness.   Musculoskeletal:      Cervical back: Neck supple.        Legs:    Lymphadenopathy:      Cervical: No cervical adenopathy.   Skin:     General: Skin is warm and dry.      Findings: No rash.   Neurological:      Mental Status: She is alert and oriented to person, place, and time.   Psychiatric:         Attention and Perception: She is attentive.         Speech: Speech normal.         Behavior: Behavior normal.         Thought Content: Thought content normal.         Judgment: Judgment normal.           Assessment:       1. Chronic bilateral low back pain with bilateral sciatica    2. Postural urinary incontinence    3. Panic disorder without agoraphobia    4. Chronic recurrent major depressive disorder    5. Influenza vaccine administered                 Plan:       Chronic bilateral low back pain with bilateral sciatica  Comments:  Chronic. Will try on lyrica. Offered therapy. May also benefit from PM if lyrica doesnt provide enough relief.  Orders:  -     pregabalin (LYRICA) 75 MG capsule; Take 1 capsule (75 mg total) by mouth 2 (two) times daily as needed (back pain).  Dispense: 90 capsule; Refill: 0    Postural urinary incontinence  Comments:  Controlled. Will continue to monitor symptoms on vesicare.    Panic disorder without agoraphobia  Comments:  Symptomatic. Will continue to monitor symptoms    Chronic recurrent major depressive disorder  Comments:  Symptomatic. Will continue to monitor symptoms on prozac.    Influenza vaccine administered  -     influenza (adjuvanted) (Fluad) 45 mcg/0.5 mL IM vaccine (> or = 66 yo) 0.5 mL          Follow up in about 4 months (around 5/9/2025) for Arthritis, Anxiety, Depression.        1/9/2025 [place]

## 2025-01-09 ENCOUNTER — OFFICE VISIT (OUTPATIENT)
Dept: FAMILY MEDICINE | Facility: CLINIC | Age: 70
End: 2025-01-09
Payer: MEDICARE

## 2025-01-09 VITALS
HEIGHT: 67 IN | SYSTOLIC BLOOD PRESSURE: 98 MMHG | BODY MASS INDEX: 32.33 KG/M2 | DIASTOLIC BLOOD PRESSURE: 62 MMHG | HEART RATE: 77 BPM | WEIGHT: 206 LBS | OXYGEN SATURATION: 96 %

## 2025-01-09 DIAGNOSIS — Z23 INFLUENZA VACCINE ADMINISTERED: ICD-10-CM

## 2025-01-09 DIAGNOSIS — N39.492 POSTURAL URINARY INCONTINENCE: ICD-10-CM

## 2025-01-09 DIAGNOSIS — M54.41 CHRONIC BILATERAL LOW BACK PAIN WITH BILATERAL SCIATICA: Primary | ICD-10-CM

## 2025-01-09 DIAGNOSIS — G89.29 CHRONIC BILATERAL LOW BACK PAIN WITH BILATERAL SCIATICA: Primary | ICD-10-CM

## 2025-01-09 DIAGNOSIS — M54.42 CHRONIC BILATERAL LOW BACK PAIN WITH BILATERAL SCIATICA: Primary | ICD-10-CM

## 2025-01-09 DIAGNOSIS — F41.0 PANIC DISORDER WITHOUT AGORAPHOBIA: ICD-10-CM

## 2025-01-09 DIAGNOSIS — F33.9 CHRONIC RECURRENT MAJOR DEPRESSIVE DISORDER: ICD-10-CM

## 2025-01-09 RX ORDER — PREGABALIN 75 MG/1
75 CAPSULE ORAL 2 TIMES DAILY PRN
Qty: 90 CAPSULE | Refills: 0 | Status: SHIPPED | OUTPATIENT
Start: 2025-01-09

## 2025-03-24 DIAGNOSIS — Z00.00 ENCOUNTER FOR MEDICARE ANNUAL WELLNESS EXAM: ICD-10-CM

## 2025-04-14 ENCOUNTER — TELEPHONE (OUTPATIENT)
Dept: FAMILY MEDICINE | Facility: CLINIC | Age: 70
End: 2025-04-14
Payer: MEDICARE

## 2025-04-14 DIAGNOSIS — E78.01 FAMILIAL HYPERCHOLESTEROLEMIA: Primary | ICD-10-CM

## 2025-04-14 DIAGNOSIS — Z79.899 ENCOUNTER FOR LONG-TERM (CURRENT) USE OF MEDICATIONS: ICD-10-CM

## 2025-04-15 LAB
ALBUMIN SERPL-MCNC: 4 G/DL (ref 3.6–5.1)
ALBUMIN/GLOB SERPL: 1.3 (CALC) (ref 1–2.5)
ALP SERPL-CCNC: 200 U/L (ref 37–153)
ALT SERPL-CCNC: 17 U/L (ref 6–29)
AST SERPL-CCNC: 37 U/L (ref 10–35)
BASOPHILS # BLD AUTO: 106 CELLS/UL (ref 0–200)
BASOPHILS NFR BLD AUTO: 1 %
BILIRUB SERPL-MCNC: 1.1 MG/DL (ref 0.2–1.2)
BUN SERPL-MCNC: 12 MG/DL (ref 7–25)
BUN/CREAT SERPL: ABNORMAL (CALC) (ref 6–22)
CALCIUM SERPL-MCNC: 9.4 MG/DL (ref 8.6–10.4)
CHLORIDE SERPL-SCNC: 105 MMOL/L (ref 98–110)
CHOLEST SERPL-MCNC: 183 MG/DL
CHOLEST/HDLC SERPL: 3.5 (CALC)
CO2 SERPL-SCNC: 23 MMOL/L (ref 20–32)
CREAT SERPL-MCNC: 0.96 MG/DL (ref 0.5–1.05)
EGFR: 64 ML/MIN/1.73M2
EOSINOPHIL # BLD AUTO: 424 CELLS/UL (ref 15–500)
EOSINOPHIL NFR BLD AUTO: 4 %
ERYTHROCYTE [DISTWIDTH] IN BLOOD BY AUTOMATED COUNT: 15.8 % (ref 11–15)
GLOBULIN SER CALC-MCNC: 3.1 G/DL (CALC) (ref 1.9–3.7)
GLUCOSE SERPL-MCNC: 95 MG/DL (ref 65–99)
HCT VFR BLD AUTO: 44.9 % (ref 35–45)
HDLC SERPL-MCNC: 53 MG/DL
HGB BLD-MCNC: 14.1 G/DL (ref 11.7–15.5)
LDLC SERPL CALC-MCNC: 96 MG/DL (CALC)
LYMPHOCYTES # BLD AUTO: 3297 CELLS/UL (ref 850–3900)
LYMPHOCYTES NFR BLD AUTO: 31.1 %
MCH RBC QN AUTO: 27.5 PG (ref 27–33)
MCHC RBC AUTO-ENTMCNC: 31.4 G/DL (ref 32–36)
MCV RBC AUTO: 87.5 FL (ref 80–100)
MONOCYTES # BLD AUTO: 1145 CELLS/UL (ref 200–950)
MONOCYTES NFR BLD AUTO: 10.8 %
NEUTROPHILS # BLD AUTO: 5629 CELLS/UL (ref 1500–7800)
NEUTROPHILS NFR BLD AUTO: 53.1 %
NONHDLC SERPL-MCNC: 130 MG/DL (CALC)
PLATELET # BLD AUTO: 312 THOUSAND/UL (ref 140–400)
PMV BLD REES-ECKER: 9.5 FL (ref 7.5–12.5)
POTASSIUM SERPL-SCNC: 4 MMOL/L (ref 3.5–5.3)
PROT SERPL-MCNC: 7.1 G/DL (ref 6.1–8.1)
RBC # BLD AUTO: 5.13 MILLION/UL (ref 3.8–5.1)
SODIUM SERPL-SCNC: 143 MMOL/L (ref 135–146)
TRIGL SERPL-MCNC: 220 MG/DL
TSH SERPL-ACNC: 3.12 MIU/L (ref 0.4–4.5)
WBC # BLD AUTO: 10.6 THOUSAND/UL (ref 3.8–10.8)

## 2025-04-21 ENCOUNTER — OFFICE VISIT (OUTPATIENT)
Dept: FAMILY MEDICINE | Facility: CLINIC | Age: 70
End: 2025-04-21
Payer: MEDICARE

## 2025-04-21 VITALS
WEIGHT: 207.19 LBS | BODY MASS INDEX: 32.45 KG/M2 | HEART RATE: 84 BPM | DIASTOLIC BLOOD PRESSURE: 72 MMHG | SYSTOLIC BLOOD PRESSURE: 122 MMHG | OXYGEN SATURATION: 95 %

## 2025-04-21 DIAGNOSIS — M54.42 CHRONIC BILATERAL LOW BACK PAIN WITH BILATERAL SCIATICA: ICD-10-CM

## 2025-04-21 DIAGNOSIS — E78.2 MIXED HYPERLIPIDEMIA: Primary | ICD-10-CM

## 2025-04-21 DIAGNOSIS — F51.04 CHRONIC INSOMNIA: ICD-10-CM

## 2025-04-21 DIAGNOSIS — M54.41 CHRONIC BILATERAL LOW BACK PAIN WITH BILATERAL SCIATICA: ICD-10-CM

## 2025-04-21 DIAGNOSIS — Z76.0 MEDICATION REFILL: ICD-10-CM

## 2025-04-21 DIAGNOSIS — F41.0 PANIC DISORDER WITHOUT AGORAPHOBIA: ICD-10-CM

## 2025-04-21 DIAGNOSIS — M25.552 CHRONIC LEFT HIP PAIN: ICD-10-CM

## 2025-04-21 DIAGNOSIS — G89.29 CHRONIC BILATERAL LOW BACK PAIN WITH BILATERAL SCIATICA: ICD-10-CM

## 2025-04-21 DIAGNOSIS — G89.29 CHRONIC LEFT HIP PAIN: ICD-10-CM

## 2025-04-21 DIAGNOSIS — N39.492 POSTURAL URINARY INCONTINENCE: ICD-10-CM

## 2025-04-21 PROCEDURE — 3288F FALL RISK ASSESSMENT DOCD: CPT | Mod: CPTII,S$GLB,, | Performed by: FAMILY MEDICINE

## 2025-04-21 PROCEDURE — 1125F AMNT PAIN NOTED PAIN PRSNT: CPT | Mod: CPTII,S$GLB,, | Performed by: FAMILY MEDICINE

## 2025-04-21 PROCEDURE — 3078F DIAST BP <80 MM HG: CPT | Mod: CPTII,S$GLB,, | Performed by: FAMILY MEDICINE

## 2025-04-21 PROCEDURE — 1160F RVW MEDS BY RX/DR IN RCRD: CPT | Mod: CPTII,S$GLB,, | Performed by: FAMILY MEDICINE

## 2025-04-21 PROCEDURE — 99214 OFFICE O/P EST MOD 30 MIN: CPT | Mod: S$GLB,,, | Performed by: FAMILY MEDICINE

## 2025-04-21 PROCEDURE — 1159F MED LIST DOCD IN RCRD: CPT | Mod: CPTII,S$GLB,, | Performed by: FAMILY MEDICINE

## 2025-04-21 PROCEDURE — 3074F SYST BP LT 130 MM HG: CPT | Mod: CPTII,S$GLB,, | Performed by: FAMILY MEDICINE

## 2025-04-21 PROCEDURE — 1101F PT FALLS ASSESS-DOCD LE1/YR: CPT | Mod: CPTII,S$GLB,, | Performed by: FAMILY MEDICINE

## 2025-04-21 PROCEDURE — 3008F BODY MASS INDEX DOCD: CPT | Mod: CPTII,S$GLB,, | Performed by: FAMILY MEDICINE

## 2025-04-21 RX ORDER — ATORVASTATIN CALCIUM 20 MG/1
20 TABLET, FILM COATED ORAL DAILY
Qty: 90 TABLET | Refills: 3 | Status: SHIPPED | OUTPATIENT
Start: 2025-04-21

## 2025-04-21 RX ORDER — PREGABALIN 75 MG/1
75 CAPSULE ORAL 2 TIMES DAILY PRN
Qty: 90 CAPSULE | Refills: 1 | Status: SHIPPED | OUTPATIENT
Start: 2025-04-21

## 2025-04-21 RX ORDER — VENLAFAXINE HYDROCHLORIDE 75 MG/1
75 CAPSULE, EXTENDED RELEASE ORAL DAILY
Qty: 90 CAPSULE | Refills: 3 | Status: SHIPPED | OUTPATIENT
Start: 2025-04-21

## 2025-04-21 RX ORDER — ZOLPIDEM TARTRATE 10 MG/1
10 TABLET ORAL NIGHTLY
Qty: 90 TABLET | Refills: 1 | Status: SHIPPED | OUTPATIENT
Start: 2025-04-21

## 2025-04-21 RX ORDER — HYDROCODONE BITARTRATE AND ACETAMINOPHEN 10; 325 MG/1; MG/1
1 TABLET ORAL EVERY 6 HOURS PRN
Qty: 120 TABLET | Refills: 0 | Status: SHIPPED | OUTPATIENT
Start: 2025-04-21

## 2025-04-21 RX ORDER — SOLIFENACIN SUCCINATE 10 MG/1
10 TABLET, FILM COATED ORAL DAILY
Qty: 90 TABLET | Refills: 1 | Status: SHIPPED | OUTPATIENT
Start: 2025-04-21

## 2025-04-21 NOTE — PROGRESS NOTES
SUBJECTIVE:    Patient ID: Stephanie Knox is a 69 y.o. female.    Chief Complaint: Insomnia      Pt here to checkup on acute and chronic conditions.       Has been having pain in her left hip joint/groin. Hurts to sit.  Has been doing chair exercises, and it does hurt to do them, TIW. Does take occasional hydrocodone before she is going to go out and do something, about every 4 days.  The flexeril doesn't do any good.     Used to get shots in her back weekly from Dr. Madrigal that used to help for a while.   Feels like the lyrica is helping her back pain.  Has had mri hips in 2021 that shows nl left hip but OA in the right hip and is s/p r hip replacement.     Anxiety has been bad. Had to put her dog down since last visit.  Has been crying a lot. Taking buspar and prozac. Has not been getting out much but has been walking 5000 steps up and down the street daily.  Used to enjoy riding her bike.  Still doesn't wan to go out or leave the house except to go the doctor, see her kids.  Has been having  panic attacks.    (Has done biofeedback and psychology in the past, and that doesn't seem to help;failed trintellix, abilify.) Not claustrophobic.      Aggravated that her weight has not changed.       Still not sleeping well, unless she takes ambien, gets 4-5 hours, sometimes longer. (failed trazodone, nightmares)    The vesicare is working for her incontinence. Giving her enough to get to the bathroom.       Remains smoke free.          Had labs done: fBS 95, GFR 64, LDL 96, TSH 3.12  ---------------------------------------------------------------------  Pt had cscope 9/2022, no polyp ([facility]) to repeat 3 years.   Mammogram 3/2021, due, already has order.  Will do when she doesn't think she will go and have a panic attack.  DEXA ordered.  Eye exam UTD at [facility]  Normal PVD screen.          Telephone on 04/14/2025   Component Date Value Ref Range Status    WBC 04/14/2025 10.6  3.8 - 10.8 Thousand/uL Final    RBC  04/14/2025 5.13 (H)  3.80 - 5.10 Million/uL Final    Hemoglobin 04/14/2025 14.1  11.7 - 15.5 g/dL Final    Hematocrit 04/14/2025 44.9  35.0 - 45.0 % Final    MCV 04/14/2025 87.5  80.0 - 100.0 fL Final    MCH 04/14/2025 27.5  27.0 - 33.0 pg Final    MCHC 04/14/2025 31.4 (L)  32.0 - 36.0 g/dL Final    Comment: For adults, a slight decrease in the calculated MCHC  value (in the range of 30 to 32 g/dL) is most likely  not clinically significant; however, it should be  interpreted with caution in correlation with other  red cell parameters and the patient's clinical  condition.      RDW 04/14/2025 15.8 (H)  11.0 - 15.0 % Final    Platelets 04/14/2025 312  140 - 400 Thousand/uL Final    MPV 04/14/2025 9.5  7.5 - 12.5 fL Final    Neutrophils, Abs 04/14/2025 5,629  1,500 - 7,800 cells/uL Final    Lymph # 04/14/2025 3,297  850 - 3,900 cells/uL Final    Mono # 04/14/2025 1,145 (H)  200 - 950 cells/uL Final    Eos # 04/14/2025 424  15 - 500 cells/uL Final    Baso # 04/14/2025 106  0 - 200 cells/uL Final    Neutrophils Relative 04/14/2025 53.1  % Final    Lymph % 04/14/2025 31.1  % Final    Mono % 04/14/2025 10.8  % Final    Eosinophil % 04/14/2025 4.0  % Final    Basophil % 04/14/2025 1.0  % Final    Glucose 04/14/2025 95  65 - 99 mg/dL Final    Comment:               Fasting reference interval         BUN 04/14/2025 12  7 - 25 mg/dL Final    Creatinine 04/14/2025 0.96  0.50 - 1.05 mg/dL Final    eGFR 04/14/2025 64  > OR = 60 mL/min/1.73m2 Final    BUN/Creatinine Ratio 04/14/2025 SEE NOTE:  6 - 22 (calc) Final    Comment:    Not Reported: BUN and Creatinine are within     reference range.            Sodium 04/14/2025 143  135 - 146 mmol/L Final    Potassium 04/14/2025 4.0  3.5 - 5.3 mmol/L Final    Chloride 04/14/2025 105  98 - 110 mmol/L Final    CO2 04/14/2025 23  20 - 32 mmol/L Final    Calcium 04/14/2025 9.4  8.6 - 10.4 mg/dL Final    Total Protein 04/14/2025 7.1  6.1 - 8.1 g/dL Final    Albumin 04/14/2025 4.0  3.6 - 5.1  g/dL Final    Globulin, Total 04/14/2025 3.1  1.9 - 3.7 g/dL (calc) Final    Albumin/Globulin Ratio 04/14/2025 1.3  1.0 - 2.5 (calc) Final    Total Bilirubin 04/14/2025 1.1  0.2 - 1.2 mg/dL Final    Alkaline Phosphatase 04/14/2025 200 (H)  37 - 153 U/L Final    AST 04/14/2025 37 (H)  10 - 35 U/L Final    ALT 04/14/2025 17  6 - 29 U/L Final    Cholesterol 04/14/2025 183  <200 mg/dL Final    HDL 04/14/2025 53  > OR = 50 mg/dL Final    Triglycerides 04/14/2025 220 (H)  <150 mg/dL Final    Comment:    If a non-fasting specimen was collected, consider  repeat triglyceride testing on a fasting specimen  if clinically indicated.   Yolis et al. J. of Clin. Lipidol. 2015;9:129-169.         LDL Cholesterol 04/14/2025 96  mg/dL (calc) Final    Comment: Reference range: <100     Desirable range <100 mg/dL for primary prevention;    <70 mg/dL for patients with CHD or diabetic patients   with > or = 2 CHD risk factors.     LDL-C is now calculated using the Naveen-Francis   calculation, which is a validated novel method providing   better accuracy than the Friedewald equation in the   estimation of LDL-C.   Naveen SS et al. ZAC. 2013;310(19): 6025-4878   (http://education.[employer] Tools/faq/VPN571)      HDL/Cholesterol Ratio 04/14/2025 3.5  <5.0 (calc) Final    Non HDL Chol. (LDL+VLDL) 04/14/2025 130 (H)  <130 mg/dL (calc) Final    Comment: For patients with diabetes plus 1 major ASCVD risk   factor, treating to a non-HDL-C goal of <100 mg/dL   (LDL-C of <70 mg/dL) is considered a therapeutic   option.      TSH w/reflex to FT4 04/14/2025 3.12  0.40 - 4.50 mIU/L Final             Past Medical History:   Diagnosis Date    ADHD (attention deficit hyperactivity disorder)     Anxiety     Arthritis     Colon polyps     Depression     Hyperlipidemia     Insomnia     Memory difficulties      Social History     Socioeconomic History    Marital status:    Tobacco Use    Smoking status: Former     Current packs/day: 0.25      Types: Cigarettes    Smokeless tobacco: Never   Substance and Sexual Activity    Alcohol use: Not Currently    Drug use: Never     Social Drivers of Health     Financial Resource Strain: Low Risk  (7/5/2023)    Overall Financial Resource Strain (CARDIA)     Difficulty of Paying Living Expenses: Not very hard   Food Insecurity: No Food Insecurity (7/5/2023)    Hunger Vital Sign     Worried About Running Out of Food in the Last Year: Never true     Ran Out of Food in the Last Year: Never true   Transportation Needs: No Transportation Needs (7/5/2023)    PRAPARE - Transportation     Lack of Transportation (Medical): No     Lack of Transportation (Non-Medical): No   Physical Activity: Unknown (7/5/2023)    Exercise Vital Sign     Days of Exercise per Week: 4 days   Recent Concern: Physical Activity - Insufficiently Active (7/5/2023)    Exercise Vital Sign     Days of Exercise per Week: 4 days     Minutes of Exercise per Session: 30 min   Stress: Stress Concern Present (7/5/2023)    [employer] [place] [employer] - Occupational Stress Questionnaire     Feeling of Stress : Very much   Housing Stability: Low Risk  (7/5/2023)    Housing Stability Vital Sign     Unable to Pay for Housing in the Last Year: No     Number of Places Lived in the Last Year: 1     Unstable Housing in the Last Year: No     Past Surgical History:   Procedure Laterality Date    HIP REPLACEMENT ARTHROPLASTY Right 02/08/2023    TONSILLECTOMY       Family History   Problem Relation Name Age of Onset    Emphysema Mother      Diabetes Father      Breast cancer Maternal Aunt         Review of patient's allergies indicates:  No Known Allergies    Current Outpatient Medications:     busPIRone (BUSPAR) 15 MG tablet, Take 1 tablet (15 mg total) by mouth 2 (two) times daily., Disp: 180 tablet, Rfl: 1    cyclobenzaprine (FLEXERIL) 10 MG tablet, Take 1 tablet (10 mg total) by mouth 3 (three) times daily as needed., Disp: 270 tablet, Rfl: 1    FLUoxetine  40 MG capsule, Take 1 capsule (40 mg total) by mouth once daily., Disp: 90 capsule, Rfl: 3    atorvastatin (LIPITOR) 20 MG tablet, Take 1 tablet (20 mg total) by mouth once daily., Disp: 90 tablet, Rfl: 3    HYDROcodone-acetaminophen (NORCO)  mg per tablet, Take 1 tablet by mouth every 6 (six) hours as needed for Pain., Disp: 120 tablet, Rfl: 0    pregabalin (LYRICA) 75 MG capsule, Take 1 capsule (75 mg total) by mouth 2 (two) times daily as needed (back pain)., Disp: 90 capsule, Rfl: 1    solifenacin (VESICARE) 10 MG tablet, Take 1 tablet (10 mg total) by mouth once daily., Disp: 90 tablet, Rfl: 1    venlafaxine (EFFEXOR-XR) 75 MG 24 hr capsule, Take 1 capsule (75 mg total) by mouth once daily., Disp: 90 capsule, Rfl: 3    zolpidem (AMBIEN) 10 mg Tab, Take 1 tablet (10 mg total) by mouth every evening., Disp: 90 tablet, Rfl: 1    Review of Systems   Constitutional:  Negative for appetite change and unexpected weight change.   Respiratory:  Negative for chest tightness, shortness of breath and wheezing.    Cardiovascular:  Negative for leg swelling.   Gastrointestinal:  Negative for constipation.        -heartburn   Genitourinary:  Negative for difficulty urinating, dysuria, frequency and urgency.   Musculoskeletal:  Positive for back pain.        +Left hip pain   Neurological:  Negative for dizziness.   Hematological:  Does not bruise/bleed easily.   Psychiatric/Behavioral:  Positive for dysphoric mood and sleep disturbance. Negative for suicidal ideas. The patient is not nervous/anxious.    All other systems reviewed and are negative.         Objective:      Vitals:    04/21/25 0957   BP: 122/72   Pulse: 84   SpO2: 95%   Weight: 94 kg (207 lb 3.2 oz)             Wt Readings from Last 6 Encounters:   04/21/25 94 kg (207 lb 3.2 oz)   01/09/25 93.4 kg (206 lb)   08/29/24 96.2 kg (212 lb)   05/22/24 95.3 kg (210 lb)   01/11/24 92.1 kg (203 lb)   07/07/23 97.5 kg (215 lb)             Physical Exam  Vitals  reviewed.   Constitutional:       General: She is not in acute distress.     Appearance: She is well-developed.   HENT:      Head: Normocephalic and atraumatic.   Neck:      Thyroid: No thyromegaly.   Cardiovascular:      Rate and Rhythm: Normal rate and regular rhythm.      Heart sounds: Normal heart sounds. No murmur heard.     No friction rub.   Pulmonary:      Effort: Pulmonary effort is normal.      Breath sounds: Normal breath sounds. No wheezing or rales.   Abdominal:      General: Bowel sounds are normal. There is no distension.      Palpations: Abdomen is soft.      Tenderness: There is no abdominal tenderness.   Musculoskeletal:      Cervical back: Neck supple.   Lymphadenopathy:      Cervical: No cervical adenopathy.   Skin:     General: Skin is warm and dry.      Findings: No rash.   Neurological:      Mental Status: She is alert and oriented to person, place, and time.   Psychiatric:         Attention and Perception: She is attentive.         Speech: Speech normal.         Behavior: Behavior normal.         Thought Content: Thought content normal.         Judgment: Judgment normal.           Assessment:       1. Mixed hyperlipidemia    2. Panic disorder without agoraphobia    3. Postural urinary incontinence    4. Chronic left hip pain    5. Medication refill    6. Chronic bilateral low back pain with bilateral sciatica    7. Chronic insomnia               Plan:       Mixed hyperlipidemia  Comments:  Optimal. LDL 96. To continue statin    Panic disorder without agoraphobia  Comments:  Symptomatic. To continue to monitor on prozac/buspar. Could benefit from psyche, but pt doesnt want therapy  Orders:  -     venlafaxine (EFFEXOR-XR) 75 MG 24 hr capsule; Take 1 capsule (75 mg total) by mouth once daily.  Dispense: 90 capsule; Refill: 3    Postural urinary incontinence  Comments:  Controlled. Will continue to monitor on vesicare  Orders:  -     solifenacin (VESICARE) 10 MG tablet; Take 1 tablet (10 mg  total) by mouth once daily.  Dispense: 90 tablet; Refill: 1    Chronic left hip pain  Comments:  Chronic. Suspect arthritis. Will xray left hip. Likely will need to see Ortho. Pain may be coming from lspine  Orders:  -     X-Ray Hip 2 or 3 views Left with Pelvis when performed; Future; Expected date: 04/21/2025    Medication refill  -     atorvastatin (LIPITOR) 20 MG tablet; Take 1 tablet (20 mg total) by mouth once daily.  Dispense: 90 tablet; Refill: 3    Chronic bilateral low back pain with bilateral sciatica  Comments:  Chronic. To continue lyrica and prn hydrocodone for now.  Orders:  -     HYDROcodone-acetaminophen (NORCO)  mg per tablet; Take 1 tablet by mouth every 6 (six) hours as needed for Pain.  Dispense: 120 tablet; Refill: 0  -     pregabalin (LYRICA) 75 MG capsule; Take 1 capsule (75 mg total) by mouth 2 (two) times daily as needed (back pain).  Dispense: 90 capsule; Refill: 1    Chronic insomnia  Comments:  Symptomatic.  To continue on ambien/doxepin.  Will continue on monitor symptoms.  Orders:  -     zolpidem (AMBIEN) 10 mg Tab; Take 1 tablet (10 mg total) by mouth every evening.  Dispense: 90 tablet; Refill: 1    Other  Lab results discussed and reviewed with patient.    Chronic Pain Notes:  Have discussed the risks and benefits of chronic narcotic therapy including pain control, dependence, and addiction.  The patient is aware and accepts the risks and benefits.        Follow up in about 4 months (around 8/21/2025) for Anxiety.        4/21/2025 Eron Hill

## 2025-05-05 DIAGNOSIS — N39.492 POSTURAL URINARY INCONTINENCE: Primary | ICD-10-CM

## 2025-05-05 RX ORDER — TOLTERODINE 4 MG/1
4 CAPSULE, EXTENDED RELEASE ORAL DAILY
Qty: 90 CAPSULE | Refills: 1 | Status: SHIPPED | OUTPATIENT
Start: 2025-05-05

## 2025-05-05 NOTE — TELEPHONE ENCOUNTER
The patient's prescription has been approved and sent to   [facility] - [place], [place] - [address]  [address]  [place] [address]  Phone: [phone] Fax: [phone]

## 2025-05-05 NOTE — TELEPHONE ENCOUNTER
----- Message from Med Assistant Mary sent at 5/5/2025 10:55 AM CDT -----  Patients  said [facility] will not give her the Vesicare. States she has to try Oxybutynin ER, Detrol LA, or Trospium. Would like a call back asap because he is at [facility] right now and he would like to see if it's possible to have it done [phone]

## 2025-05-08 ENCOUNTER — HOSPITAL ENCOUNTER (OUTPATIENT)
Dept: RADIOLOGY | Facility: HOSPITAL | Age: 70
Discharge: HOME | End: 2025-05-08
Attending: FAMILY MEDICINE
Payer: MEDICARE

## 2025-05-08 DIAGNOSIS — G89.29 CHRONIC LEFT HIP PAIN: ICD-10-CM

## 2025-05-08 DIAGNOSIS — M25.552 CHRONIC LEFT HIP PAIN: ICD-10-CM

## 2025-05-08 PROCEDURE — 73502 X-RAY EXAM HIP UNI 2-3 VIEWS: CPT | Mod: TC,PO,LT

## 2025-05-08 PROCEDURE — 73502 X-RAY EXAM HIP UNI 2-3 VIEWS: CPT | Mod: 26,LT,, | Performed by: RADIOLOGY

## 2025-05-12 DIAGNOSIS — R39.9 UTI SYMPTOMS: ICD-10-CM

## 2025-05-12 DIAGNOSIS — G89.29 CHRONIC LEFT HIP PAIN: Primary | ICD-10-CM

## 2025-05-12 DIAGNOSIS — M25.552 CHRONIC LEFT HIP PAIN: Primary | ICD-10-CM

## 2025-05-12 RX ORDER — CEFUROXIME AXETIL 500 MG/1
500 TABLET ORAL 2 TIMES DAILY
Qty: 6 TABLET | Refills: 0 | Status: SHIPPED | OUTPATIENT
Start: 2025-05-12

## 2025-05-12 RX ORDER — MELOXICAM 15 MG/1
15 TABLET ORAL DAILY
Qty: 30 TABLET | Refills: 0 | Status: SHIPPED | OUTPATIENT
Start: 2025-05-12

## 2025-05-12 NOTE — TELEPHONE ENCOUNTER
Per Dr. Hill needs to see ortho has advanced arthritis of hip.   Can try mobic daily.       Pt notified

## 2025-05-12 NOTE — TELEPHONE ENCOUNTER
----- Message from Citlalli sent at 5/12/2025  1:52 PM CDT -----  Vm- 1:30- pt had an xray done last week and has not heard about the results [phone]

## 2025-05-12 NOTE — TELEPHONE ENCOUNTER
----- Message from Citlalli sent at 5/12/2025  3:41 PM CDT -----  Pt is in [place] and thinks she is getting a uti would like something called in for that [phone]

## 2025-05-13 ENCOUNTER — TELEPHONE (OUTPATIENT)
Dept: ORTHOPEDICS | Facility: CLINIC | Age: 70
End: 2025-05-13
Payer: MEDICARE

## 2025-05-22 ENCOUNTER — TELEPHONE (OUTPATIENT)
Dept: FAMILY MEDICINE | Facility: CLINIC | Age: 70
End: 2025-05-22
Payer: MEDICARE

## 2025-05-22 NOTE — TELEPHONE ENCOUNTER
"----- Message from Johann Hernandez sent at 5/22/2025  2:04 PM CDT -----  Patient would like a call back from a nurse, she is having surgery soon and "isn't sure what to do next"[phone]  "

## 2025-05-22 NOTE — TELEPHONE ENCOUNTER
Spoke with pt states she does not have an orthopedic . does not have surgery scheduled. Advised pt she would need to find orthopedic first.

## 2025-05-29 ENCOUNTER — PATIENT MESSAGE (OUTPATIENT)
Dept: FAMILY MEDICINE | Facility: CLINIC | Age: 70
End: 2025-05-29
Payer: MEDICARE

## 2025-06-03 ENCOUNTER — PATIENT MESSAGE (OUTPATIENT)
Dept: FAMILY MEDICINE | Facility: CLINIC | Age: 70
End: 2025-06-03
Payer: MEDICARE

## 2025-06-17 ENCOUNTER — TELEPHONE (OUTPATIENT)
Dept: FAMILY MEDICINE | Facility: CLINIC | Age: 70
End: 2025-06-17
Payer: MEDICARE

## 2025-06-17 NOTE — TELEPHONE ENCOUNTER
----- Message from Tawny sent at 6/17/2025 11:45 AM CDT -----  Patient states she is positive that she has a UTI. Asking if a prescription could be called into the pharmacy for her.   [facility]\""   [phone]

## 2025-06-18 ENCOUNTER — OFFICE VISIT (OUTPATIENT)
Dept: FAMILY MEDICINE | Facility: CLINIC | Age: 70
End: 2025-06-18
Payer: MEDICARE

## 2025-06-18 VITALS
HEIGHT: 67 IN | SYSTOLIC BLOOD PRESSURE: 126 MMHG | HEART RATE: 75 BPM | WEIGHT: 214 LBS | BODY MASS INDEX: 33.59 KG/M2 | DIASTOLIC BLOOD PRESSURE: 66 MMHG | OXYGEN SATURATION: 95 %

## 2025-06-18 DIAGNOSIS — R39.9 UTI SYMPTOMS: Primary | ICD-10-CM

## 2025-06-18 DIAGNOSIS — Z76.0 MEDICATION REFILL: ICD-10-CM

## 2025-06-18 DIAGNOSIS — L29.9 ITCHING: ICD-10-CM

## 2025-06-18 DIAGNOSIS — R30.0 DYSURIA: ICD-10-CM

## 2025-06-18 LAB
BILIRUB UR QL STRIP: NEGATIVE
GLUCOSE UR QL STRIP: NEGATIVE
KETONES UR QL STRIP: NEGATIVE
LEUKOCYTE ESTERASE UR QL STRIP: POSITIVE
PH, POC UA: 5.5
POC BLOOD, URINE: POSITIVE
POC NITRATES, URINE: NEGATIVE
PROT UR QL STRIP: NEGATIVE
SP GR UR STRIP: 1 (ref 1–1.03)
UROBILINOGEN UR STRIP-ACNC: 0.2 (ref 0.1–1.1)

## 2025-06-18 PROCEDURE — 3008F BODY MASS INDEX DOCD: CPT | Mod: CPTII,S$GLB,, | Performed by: FAMILY MEDICINE

## 2025-06-18 PROCEDURE — 3078F DIAST BP <80 MM HG: CPT | Mod: CPTII,S$GLB,, | Performed by: FAMILY MEDICINE

## 2025-06-18 PROCEDURE — 3074F SYST BP LT 130 MM HG: CPT | Mod: CPTII,S$GLB,, | Performed by: FAMILY MEDICINE

## 2025-06-18 PROCEDURE — 3288F FALL RISK ASSESSMENT DOCD: CPT | Mod: CPTII,S$GLB,, | Performed by: FAMILY MEDICINE

## 2025-06-18 PROCEDURE — 99213 OFFICE O/P EST LOW 20 MIN: CPT | Mod: S$GLB,,, | Performed by: FAMILY MEDICINE

## 2025-06-18 PROCEDURE — 1159F MED LIST DOCD IN RCRD: CPT | Mod: CPTII,S$GLB,, | Performed by: FAMILY MEDICINE

## 2025-06-18 PROCEDURE — 1160F RVW MEDS BY RX/DR IN RCRD: CPT | Mod: CPTII,S$GLB,, | Performed by: FAMILY MEDICINE

## 2025-06-18 PROCEDURE — 81003 URINALYSIS AUTO W/O SCOPE: CPT | Mod: QW,S$GLB,, | Performed by: FAMILY MEDICINE

## 2025-06-18 PROCEDURE — 1101F PT FALLS ASSESS-DOCD LE1/YR: CPT | Mod: CPTII,S$GLB,, | Performed by: FAMILY MEDICINE

## 2025-06-18 RX ORDER — ZOLPIDEM TARTRATE 10 MG/1
10 TABLET ORAL NIGHTLY
Qty: 90 TABLET | Refills: 1 | Status: SHIPPED | OUTPATIENT
Start: 2025-06-18

## 2025-06-18 RX ORDER — NITROFURANTOIN 25; 75 MG/1; MG/1
100 CAPSULE ORAL 2 TIMES DAILY
Qty: 10 CAPSULE | Refills: 0 | Status: SHIPPED | OUTPATIENT
Start: 2025-06-18

## 2025-06-18 NOTE — PROGRESS NOTES
SUBJECTIVE:    Patient ID: Stephanie Knox is a 70 y.o. female.    Chief Complaint: Urinary Tract Infection (Complains of UTI symptoms for 1 week, she also states she has been itching all over for 1 mo//brought med bottles//tc)      Pt here for possible UTI.    Pt has been having symptoms of burning and itching for a week. Pt cannot seem to give a urinary sample today.     Has been having issues with itching all over for 3-4 weeks. Keeps skin moisturized. The itching is when she is not busy and at night. Anxiety has been ok. Has been not been having  panic attacks.    (Has done biofeedback and psychology in the past, and that doesn't seem to help;failed trintellix, abilify.) doesn't think her norco makes her itch, though she has been taking them a little more than usual.  Had a UTI last month has a little dribble, and so uses pads.       Lyrica is a relatively new med, but thinks the itching started before she started that medication.      Had labs done: fBS 95, GFR 64, LDL 96, TSH 3.12  ---------------------------------------------------------------------  Pt had cscope 9/2022, no polyp ([facility]ashif) to repeat 3 years.   Mammogram 3/2021, due, already has order.  Will do when she doesn't think she will go and have a panic attack.  DEXA ordered.  Eye exam UTD. Had cataract surgery in Jan 2025, [facility] 20[facility].   Normal PVD screen.          Office Visit on 06/18/2025   Component Date Value Ref Range Status    POC Blood, Urine 06/18/2025 Positive (A)  Negative Final    POC Bilirubin, Urine 06/18/2025 Negative  Negative Final    POC Urobilinogen, Urine 06/18/2025 0.2  0.1 - 1.1 Final    POC Ketones, Urine 06/18/2025 Negative  Negative Final    POC Protein, Urine 06/18/2025 Negative  Negative Final    POC Nitrates, Urine 06/18/2025 Negative  Negative Final    POC Glucose, Urine 06/18/2025 Negative  Negative Final             Past Medical History:   Diagnosis Date    ADHD (attention deficit hyperactivity disorder)      Anxiety     Arthritis     Colon polyps     Depression     Hyperlipidemia     Insomnia     Memory difficulties      Social History     Socioeconomic History    Marital status:    Tobacco Use    Smoking status: Former     Current packs/day: 0.25     Types: Cigarettes    Smokeless tobacco: Never   Substance and Sexual Activity    Alcohol use: Not Currently    Drug use: Never     Social Drivers of Health     Financial Resource Strain: Low Risk  (7/5/2023)    Overall Financial Resource Strain (CARDIA)     Difficulty of Paying Living Expenses: Not very hard   Food Insecurity: No Food Insecurity (7/5/2023)    Hunger Vital Sign     Worried About Running Out of Food in the Last Year: Never true     Ran Out of Food in the Last Year: Never true   Transportation Needs: No Transportation Needs (7/5/2023)    PRAPARE - Transportation     Lack of Transportation (Medical): No     Lack of Transportation (Non-Medical): No   Physical Activity: Unknown (7/5/2023)    Exercise Vital Sign     Days of Exercise per Week: 4 days   Recent Concern: Physical Activity - Insufficiently Active (7/5/2023)    Exercise Vital Sign     Days of Exercise per Week: 4 days     Minutes of Exercise per Session: 30 min   Stress: Stress Concern Present (7/5/2023)    [employer] - Occupational Stress Questionnaire     Feeling of Stress : Very much   Housing Stability: Low Risk  (7/5/2023)    Housing Stability Vital Sign     Unable to Pay for Housing in the Last Year: No     Number of Places Lived in the Last Year: 1     Unstable Housing in the Last Year: No     Past Surgical History:   Procedure Laterality Date    HIP REPLACEMENT ARTHROPLASTY Right 02/08/2023    TONSILLECTOMY       Family History   Problem Relation Name Age of Onset    Emphysema Mother      Diabetes Father      Breast cancer Maternal Aunt         Review of patient's allergies indicates:  No Known Allergies    Current Outpatient Medications:     antiox  #8/om3/dha/epa/lut/zeax (PRESERVISION AREDS 2, OMEGA-3, ORAL), Take 100 mg by mouth once daily., Disp: , Rfl:     atorvastatin (LIPITOR) 20 MG tablet, Take 1 tablet (20 mg total) by mouth once daily., Disp: 90 tablet, Rfl: 3    busPIRone (BUSPAR) 15 MG tablet, Take 1 tablet (15 mg total) by mouth 2 (two) times daily., Disp: 180 tablet, Rfl: 1    cyclobenzaprine (FLEXERIL) 10 MG tablet, Take 1 tablet (10 mg total) by mouth 3 (three) times daily as needed., Disp: 270 tablet, Rfl: 1    FLUoxetine 40 MG capsule, Take 1 capsule (40 mg total) by mouth once daily., Disp: 90 capsule, Rfl: 3    HYDROcodone-acetaminophen (NORCO)  mg per tablet, Take 1 tablet by mouth every 6 (six) hours as needed for Pain., Disp: 120 tablet, Rfl: 0    meloxicam (MOBIC) 15 MG tablet, Take 1 tablet (15 mg total) by mouth once daily., Disp: 30 tablet, Rfl: 0    pregabalin (LYRICA) 75 MG capsule, Take 1 capsule (75 mg total) by mouth 2 (two) times daily as needed (back pain)., Disp: 90 capsule, Rfl: 1    tolterodine (DETROL LA) 4 MG 24 hr capsule, Take 1 capsule (4 mg total) by mouth once daily., Disp: 90 capsule, Rfl: 1    venlafaxine (EFFEXOR-XR) 75 MG 24 hr capsule, Take 1 capsule (75 mg total) by mouth once daily., Disp: 90 capsule, Rfl: 3    nitrofurantoin, macrocrystal-monohydrate, (MACROBID) 100 MG capsule, Take 1 capsule (100 mg total) by mouth 2 (two) times daily., Disp: 10 capsule, Rfl: 0    zolpidem (AMBIEN) 10 mg Tab, Take 1 tablet (10 mg total) by mouth every evening., Disp: 90 tablet, Rfl: 1    Review of Systems   Constitutional:  Negative for appetite change and unexpected weight change.   Respiratory:  Negative for chest tightness, shortness of breath and wheezing.    Cardiovascular:  Negative for leg swelling.   Gastrointestinal:  Negative for constipation.        -heartburn   Genitourinary:  Negative for difficulty urinating, dysuria, frequency and urgency.   Musculoskeletal:  Negative for back pain.        +Left hip pain  "  Neurological:  Negative for dizziness.   Hematological:  Does not bruise/bleed easily.   Psychiatric/Behavioral:  Positive for sleep disturbance. Negative for dysphoric mood and suicidal ideas. The patient is nervous/anxious.    All other systems reviewed and are negative.         Objective:      Vitals:    06/18/25 1022   BP: 126/66   Pulse: 75   SpO2: 95%   Weight: 97.1 kg (214 lb)   Height: 5' 7" (1.702 m)       Wt Readings from Last 6 Encounters:   06/18/25 97.1 kg (214 lb)   04/21/25 94 kg (207 lb 3.2 oz)   01/09/25 93.4 kg (206 lb)   08/29/24 96.2 kg (212 lb)   05/22/24 95.3 kg (210 lb)   01/11/24 92.1 kg (203 lb)         Physical Exam  Vitals reviewed.   Constitutional:       General: She is not in acute distress.     Appearance: She is well-developed.   HENT:      Head: Normocephalic and atraumatic.   Neck:      Thyroid: No thyromegaly.   Cardiovascular:      Rate and Rhythm: Normal rate and regular rhythm.      Heart sounds: Normal heart sounds. No murmur heard.     No friction rub.   Pulmonary:      Effort: Pulmonary effort is normal.      Breath sounds: Normal breath sounds. No wheezing or rales.   Abdominal:      General: Bowel sounds are normal. There is no distension.      Palpations: Abdomen is soft.      Tenderness: There is no abdominal tenderness.   Musculoskeletal:      Cervical back: Neck supple.   Lymphadenopathy:      Cervical: No cervical adenopathy.   Skin:     General: Skin is warm and dry.      Findings: No rash.   Neurological:      Mental Status: She is alert and oriented to person, place, and time.   Psychiatric:         Attention and Perception: She is attentive.         Speech: Speech normal.         Behavior: Behavior normal.         Thought Content: Thought content normal.         Judgment: Judgment normal.           Assessment:       1. UTI symptoms    2. Dysuria    3. Itching    4. Medication refill                 Plan:       UTI symptoms  Comments:  Acute. Will tx with anbx, " pending sensitivity.  Orders:  -     Urinalysis, Reflex to Urine Culture Urine, Clean Catch; Future; Expected date: 06/18/2025  -     nitrofurantoin, macrocrystal-monohydrate, (MACROBID) 100 MG capsule; Take 1 capsule (100 mg total) by mouth 2 (two) times daily.  Dispense: 10 capsule; Refill: 0  -     POCT Urinalysis, Dipstick, Automated, W/O Scope    Dysuria  -     Urinalysis, Reflex to Urine Culture Urine, Clean Catch; Future; Expected date: 06/18/2025  -     POCT Urinalysis, Dipstick, Automated, W/O Scope    Itching  Comments:  Eitiology unknown. Anxiety vs med ?? Will continue to monitor symptoms    Medication refill  -     zolpidem (AMBIEN) 10 mg Tab; Take 1 tablet (10 mg total) by mouth every evening.  Dispense: 90 tablet; Refill: 1      Other  Lab results discussed and reviewed with patient.    Chronic Pain Notes:  Have discussed the risks and benefits of chronic narcotic therapy including pain control, dependence, and addiction.  The patient is aware and accepts the risks and benefits.        Follow up for As scheduled.        6/26/2025 Eron Hill

## 2025-06-22 LAB
APPEARANCE UR: CLEAR
BACTERIA #/AREA URNS HPF: ABNORMAL /HPF
BACTERIA UR CULT: ABNORMAL
BACTERIA UR CULT: ABNORMAL
BILIRUB UR QL STRIP: NEGATIVE
COLOR UR: YELLOW
GLUCOSE UR QL STRIP: NEGATIVE
HGB UR QL STRIP: ABNORMAL
HYALINE CASTS #/AREA URNS LPF: ABNORMAL /LPF
KETONES UR QL STRIP: NEGATIVE
LEUKOCYTE ESTERASE UR QL STRIP: ABNORMAL
NITRITE UR QL STRIP: NEGATIVE
PH UR STRIP: 6 [PH] (ref 5–8)
PROT UR QL STRIP: NEGATIVE
RBC #/AREA URNS HPF: ABNORMAL /HPF
SERVICE CMNT-IMP: ABNORMAL
SP GR UR STRIP: 1 (ref 1–1.03)
SQUAMOUS #/AREA URNS HPF: ABNORMAL /HPF
WBC #/AREA URNS HPF: ABNORMAL /HPF

## 2025-06-23 ENCOUNTER — TELEPHONE (OUTPATIENT)
Dept: FAMILY MEDICINE | Facility: CLINIC | Age: 70
End: 2025-06-23
Payer: MEDICARE

## 2025-06-24 ENCOUNTER — TELEPHONE (OUTPATIENT)
Dept: FAMILY MEDICINE | Facility: CLINIC | Age: 70
End: 2025-06-24
Payer: MEDICARE

## 2025-06-24 NOTE — TELEPHONE ENCOUNTER
----- Message from Tawny sent at 6/24/2025  1:56 PM CDT -----  Patient calling in regarding to speak with a nurse about a prescription being canceled by her provider.   [phone]

## 2025-06-24 NOTE — TELEPHONE ENCOUNTER
Spoke with pt states pharmacy is saying her rx for Ambien was canceled. Pt advised it was sent 6/18/25. Pt will call back and let the pharmacy know.

## 2025-07-03 ENCOUNTER — TELEPHONE (OUTPATIENT)
Dept: FAMILY MEDICINE | Facility: CLINIC | Age: 70
End: 2025-07-03
Payer: MEDICARE

## 2025-07-03 NOTE — TELEPHONE ENCOUNTER
Spoke with pt . States pt has been hallucinating and seeing dead people. Waking him up that there are women giving birth in her room. States he is not sure if its her medications, but pt has been acting like this for 3 weeks.

## 2025-07-03 NOTE — TELEPHONE ENCOUNTER
----- Message from Citlalli sent at 7/3/2025  9:09 AM CDT -----  - 9:08-  sylvia would like to talk to dr. Hill   [phone]

## 2025-07-08 ENCOUNTER — TELEPHONE (OUTPATIENT)
Dept: FAMILY MEDICINE | Facility: CLINIC | Age: 70
End: 2025-07-08
Payer: MEDICARE

## 2025-07-08 NOTE — TELEPHONE ENCOUNTER
Spoke with patient's  and advised him that we are unable to call in antibiotics without being seen.  agreed to bring patient in for an appointment with Dr. Hill tomorrow at 4 pm.

## 2025-07-08 NOTE — TELEPHONE ENCOUNTER
----- Message from Yessenia sent at 7/8/2025 11:23 AM CDT -----  Pt spouse called to request an antibiotic for a uti.  No more delusions, but only sleeping 3 hours a night.    [phone]

## 2025-08-19 ENCOUNTER — TELEPHONE (OUTPATIENT)
Dept: FAMILY MEDICINE | Facility: CLINIC | Age: 70
End: 2025-08-19
Payer: MEDICARE

## 2025-08-27 ENCOUNTER — TELEPHONE (OUTPATIENT)
Dept: FAMILY MEDICINE | Facility: CLINIC | Age: 70
End: 2025-08-27